# Patient Record
Sex: MALE | Race: WHITE | NOT HISPANIC OR LATINO | Employment: OTHER | URBAN - METROPOLITAN AREA
[De-identification: names, ages, dates, MRNs, and addresses within clinical notes are randomized per-mention and may not be internally consistent; named-entity substitution may affect disease eponyms.]

---

## 2017-01-06 ENCOUNTER — TELEPHONE (OUTPATIENT)
Dept: ENDOCRINOLOGY | Facility: MEDICAL CENTER | Age: 72
End: 2017-01-06

## 2017-01-06 NOTE — TELEPHONE ENCOUNTER
Called Pt and notified in regadrs to his BS reading which is as per Ashly recommend increased Toujeo to 28 units.    Pt will use again Toujeo since his Insurance will cover this medication.    Thank you  Aga

## 2017-01-25 ENCOUNTER — TELEPHONE (OUTPATIENT)
Dept: VASCULAR LAB | Facility: MEDICAL CENTER | Age: 72
End: 2017-01-25

## 2017-01-26 ENCOUNTER — OFFICE VISIT (OUTPATIENT)
Dept: CARDIOLOGY | Facility: CLINIC | Age: 72
End: 2017-01-26
Payer: MEDICARE

## 2017-01-26 ENCOUNTER — ANTICOAGULATION MONITORING (OUTPATIENT)
Dept: VASCULAR LAB | Facility: MEDICAL CENTER | Age: 72
End: 2017-01-26

## 2017-01-26 VITALS
BODY MASS INDEX: 31.97 KG/M2 | WEIGHT: 236 LBS | HEART RATE: 72 BPM | SYSTOLIC BLOOD PRESSURE: 120 MMHG | HEIGHT: 72 IN | OXYGEN SATURATION: 92 % | DIASTOLIC BLOOD PRESSURE: 60 MMHG

## 2017-01-26 DIAGNOSIS — Z95.810 PRESENCE OF BIVENTRICULAR AICD: ICD-10-CM

## 2017-01-26 DIAGNOSIS — I25.5 ISCHEMIC CARDIOMYOPATHY: ICD-10-CM

## 2017-01-26 DIAGNOSIS — I25.83 CORONARY ARTERY DISEASE DUE TO LIPID RICH PLAQUE: ICD-10-CM

## 2017-01-26 DIAGNOSIS — I48.19 PERSISTENT ATRIAL FIBRILLATION (HCC): ICD-10-CM

## 2017-01-26 DIAGNOSIS — Z79.01 ANTICOAGULANT LONG-TERM USE: ICD-10-CM

## 2017-01-26 DIAGNOSIS — I25.10 CORONARY ARTERY DISEASE DUE TO LIPID RICH PLAQUE: ICD-10-CM

## 2017-01-26 DIAGNOSIS — E11.9 TYPE 2 DIABETES MELLITUS WITHOUT COMPLICATION, UNSPECIFIED LONG TERM INSULIN USE STATUS: ICD-10-CM

## 2017-01-26 PROCEDURE — 4040F PNEUMOC VAC/ADMIN/RCVD: CPT | Performed by: INTERNAL MEDICINE

## 2017-01-26 PROCEDURE — G8419 CALC BMI OUT NRM PARAM NOF/U: HCPCS | Performed by: INTERNAL MEDICINE

## 2017-01-26 PROCEDURE — G8598 ASA/ANTIPLAT THER USED: HCPCS | Performed by: INTERNAL MEDICINE

## 2017-01-26 PROCEDURE — G8482 FLU IMMUNIZE ORDER/ADMIN: HCPCS | Performed by: INTERNAL MEDICINE

## 2017-01-26 PROCEDURE — 3017F COLORECTAL CA SCREEN DOC REV: CPT | Mod: 1P | Performed by: INTERNAL MEDICINE

## 2017-01-26 PROCEDURE — G8432 DEP SCR NOT DOC, RNG: HCPCS | Performed by: INTERNAL MEDICINE

## 2017-01-26 PROCEDURE — 3045F PR MOST RECENT HEMOGLOBIN A1C LEVEL 7.0-9.0%: CPT | Performed by: INTERNAL MEDICINE

## 2017-01-26 PROCEDURE — 99214 OFFICE O/P EST MOD 30 MIN: CPT | Performed by: INTERNAL MEDICINE

## 2017-01-26 PROCEDURE — 1101F PT FALLS ASSESS-DOCD LE1/YR: CPT | Mod: 8P | Performed by: INTERNAL MEDICINE

## 2017-01-26 PROCEDURE — 1036F TOBACCO NON-USER: CPT | Performed by: INTERNAL MEDICINE

## 2017-01-26 RX ORDER — INSULIN GLARGINE 300 U/ML
25 INJECTION, SOLUTION SUBCUTANEOUS
Refills: 0 | COMMUNITY
Start: 2017-01-08 | End: 2017-02-21 | Stop reason: SDUPTHER

## 2017-01-26 RX ORDER — INSULIN LISPRO 200 [IU]/ML
INJECTION, SOLUTION SUBCUTANEOUS
Refills: 0 | COMMUNITY
Start: 2017-01-14 | End: 2017-03-09 | Stop reason: SDUPTHER

## 2017-01-26 NOTE — Clinical Note
[facility] [place] [address],   2nd Floor  [place] [address]  Phone: [phone]  Fax: [phone]              Chirag Balderas  1945    Encounter Date: 1/26/2017    Thomas Zhang M.D.    Thank you for the referral. I had the pleasure of seeing Chirag Balderas today in cardiology clinic. I've attached my visit note below. If you have any questions please feel free to give me a call anytime.      Monika Catalan MD, PhD, [facility]  Cardiology and Lipidology  [facility]                                                                PROGRESS NOTE:  Chief Complaint   Patient presents with   • Follow-Up       This patient is an established male who is here today to discuss:  Recheck, CM with LVEF 30% and h/o CHF class II sx's; on O2 24/7    Patient Active Problem List    Diagnosis Date Noted   • Presence of biventricular AICD 05/02/2016     Priority: High   • Persistent atrial fibrillation (CMS-HCC) 03/04/2016     Priority: High   • Atypical atrial flutter (CMS-[facility]) 03/04/2016     Priority: High   • Anticoagulant long-term use 03/04/2016     Priority: High   • Ischemic cardiomyopathy 05/14/2013     Priority: High   • Hypertriglyceridemia 05/14/2013     Priority: High   • Coronary artery disease 06/05/2012     Priority: High   • HTN (hypertension) 06/05/2012     Priority: High   • Hyperlipidemia 06/05/2012     Priority: High   • Systolic CHF (CMS-[facility]) 09/05/2014     Priority: Medium   • Sleep apnea 05/16/2013     Priority: Medium   • Type 2 diabetes mellitus without complication (CMS-[facility]) 06/05/2012     Priority: Medium   • Hypothyroid 09/03/2014     Priority: Low   • Depression 02/21/2014     Priority: Low   • Chronic paroxysmal hemicrania, not intractable 07/09/2015   • Pulmonary nodule 09/03/2014   • ED (erectile dysfunction) 08/20/2013   • Prostate cancer screening 06/25/2013   • Colon cancer screening  06/25/2013       Past Medical History   Diagnosis Date   • Coronary artery disease 2002     History of remote MI/stent   • HTN (hypertension)     • Hyperlipidemia     • Myocardial infarction (CMS-HCC) 2002   • Ischemic cardiomyopathy March 2015     Echocardiogram with LVEF 30-40%. Mild-moderate septal hypertrophy. Moderately enlarged LA.  Moderate-severely enlarged RA. Mild AR. Mild MR. Mild TR, RVSP 5-10mmHg.   • Systolic CHF (CMS-HCC)     • DIABETES MELLITUS      On insulin   • Chronic anticoagulation          • Atrial fibrillation, persistent (CMS-HCC)          • Dental disorder      Dentures   • COPD (chronic obstructive pulmonary disease) (CMS-HCC)    • Supplemental oxygen dependent    • Sleep apnea      Uses CPAP   • Breath shortness      On oxygen   • Cataract      Bilateral IOL   • Hypothyroidism    • Depression      Past Surgical History   Procedure Laterality Date   • Cataract extraction with iol Left 1980         • Cataract extraction with iol Right 2000         • Cardiac cath  2002   • Other neurological surg  03/2013     Laminectomy lumbar   • Recovery  3/18/2016     Procedure: CATH LAB BIV ICD INSERT ST[facility]JUDE WINSLOW;  Surgeon: Recoveryonly Surgery;  Location: SURGERY PRE-POST PROC UNIT [facility];  Service:    • Aicd implant  March 2016     [facility] [facility] Quadra Assura [id number] CRT-D implanted by Dr. Winslow.       Current Outpatient Prescriptions   Medication Sig Dispense Refill   • Blood Glucose Monitoring Suppl SUPPLIES [facility] Freestyle Freedome Lite test strips. Use to test blood sugars 4-5 times daily as directed. Dx: ICD-10. E11.9, Z74.9 500 Strip 3   • TOUJEO SOLOSTAR 300 UNIT/ML Solution Pen-injector   0   • HUMALOG KWIKPEN 200 UNIT/ML Solution Pen-injector inject 10 to 25 use subcutaneously three times a day before meals  0   • sacubitril-valsartan (ENTRESTO) 24-26 MG Tab tablet Take 1 Tab by mouth 2 Times a Day. 60 Tab 11   • levothyroxine (SYNTHROID) 75 MCG Tab take 1 tablet by mouth once daily 90 Tab  "1   • furosemide (LASIX) 40 MG Tab Take 1 Tab by mouth 1 time daily as needed. 30 Tab 4   • carvedilol (COREG) 3.125 MG Tab Take 1 Tab by mouth 2 times a day, with meals. 180 Tab 2   • Insulin Syringe-Needle U-100 (INSULIN SYRINGE .3CC/31GX5/16\") 31G X 5/16\" 0.3 ML Misc For insulin shots 5 times a day 200 Each 6   • metformin (GLUCOPHAGE) 1000 MG tablet TAKE 1 TABLET BY MOUTH TWICE DAILY WITH A MEAL 180 Tab 1   • warfarin (COUMADIN) 5 MG Tab Take one-half to one (1/2-1) tablet daily as directed by coumadin clinic 90 Tab 1   • glucose blood (ONE TOUCH ULTRA TEST) strip Use to test blood sugar levels 5 times daily as directed by physician. ICD-10: E11.9, Z79.4 500 Strip 5   • atorvastatin (LIPITOR) 80 MG tablet TAKE 1 TABLET BY MOUTH EVERY DAY. 90 Tab 3   • insulin regular (HUMULIN R) 100 Unit/mL Solution Inject 20 Units as instructed 3 times a day before meals. 20 mL 6   • insulin NPH (HUMULIN,NOVOLIN) 100 UNIT/ML Suspension Inject 20 Units as instructed 2 Times a Day. 20 mL 5     No current facility-administered medications for this visit.     Pcn      Review of Systems:     Constitutional: Denies fevers, Denies weight changes  Eyes: Denies changes in vision, no eye pain  Ears/Nose/Throat/Mouth: Denies nasal congestion or sore throat   Cardiovascular: Denies chest pain or palpitations   Respiratory: Denies shortness of breath , Denies cough  Gastrointestinal/Hepatic: Denies abdominal pain, nausea, vomiting, diarrhea, constipation or GI bleeding   Genitourinary: Denies bladder dysfunction, dysuria or frequency  Musculoskeletal/Rheum: Denies  joint pain and swelling   Skin/Breast: Denies rash, denies breast lumps or discharge  Neurological: Denies headache, confusion, memory loss or focal weakness/parasthesias  Psychiatric: denies mood disorder   Endocrine:  hx of diabetes or thyroid dysfunction  Heme/Oncology/Lymph Nodes: Denies enlarged lymph nodes, denies brusing or known bleeding disorder  Allergic/Immunologic:  " hx of allergies      All other systems were reviewed and are negative (AMA/CMS criteria)      Blood pressure 120/60, pulse 72, height 1.829 m (6'), weight 107.049 kg (236 lb), SpO2 92 %.  HENT: Normocephalic, Atraumatic, Oropharynx moist mucous membranes, Dentition: , Nose normal. Mallampati 4 OP    Eyes: PERRLA, EOMI, Conjunctiva normal, No discharge.    Neck:Large; Normal range of motion, No cervical tenderness, Supple, No stridor, no JVD. No thyromegaly. No carotid bruit.    Cardiovascular: Normal heart rate, Normal rhythm, nl S1, S2, no S3, butS4; No gallops; 1-2/6 SE murmurs at AoV, MV and TV, No rubs, . Extremitites with intact distal pulses, no cyanosis, clubbing but trace edema. No heaves, thrills, HJR;  Left pectoral BiV/AICD site healing w/o bleeding/redness  Peripheral pulses: carotid 2+, brachial 2+, radial 2+, ulnar 2+, femoral ?, popliteal 1+, PT 1+, DP 1+;    Lungs: Respiratory effort is normal. Diminished breath sounds, breath sounds clear to auscultation bilaterally, no rales, no rhonchi, no wheezing.    Abdomen: large; Bowel sounds normal, Soft, No tenderness, No guarding, No rebound, No masses, No hepatosplenomegaly.    Skin: Warm, Dry, No erythema, No rash, no induration or crepitus.    Neurologic: Alert & oriented x 3, Normal motor function, Normal sensory function, No focal deficits noted, cranial nerves II through XII are normal, normal gait.    Psychiatric: Affect normal, Judgment normal, Mood normal    Results for ELY SOW (MRN [id number]) as of 1/26/2017 13:11   Ref. Range 4/19/2016 14:39 5/31/2016 10:29 9/29/2016 08:45 9/29/2016 13:31 10/11/2016 10:00   Sodium Latest Ref Range: 136-145 mmol/L   141     Potassium Latest Ref Range: 3.5-5.1 mmol/L   4.3     Chloride Latest Ref Range:  mmol/L   106     Co2 Latest Ref Range: 21-32 mmol/L   26     Anion Gap Latest Ref Range: 10-18 mmol/L   13     Glucose Latest Ref Range: 74-99 mg/dL   197 (H)     Bun Latest Ref Range: 7-18 mg/dL    21 (H)     Creatinine Latest Ref Range: 0.8-1.3 mg/dL   1.3     GFR If  Latest Ref Range: >60 mL/min/1.73 m 2   >60     GFR If Non  Latest Ref Range: >60 mL/min/1.73 m 2   54 (A)     Calcium Latest Ref Range: 8.5-11.0 mg/dL   9.0     AST(SGOT) Latest Ref Range: 15-37 U/L   23     ALT(SGPT) Latest Ref Range: 12-78 U/L   26     Alkaline Phosphatase Latest Ref Range:  U/L   51     Total Bilirubin Latest Ref Range: 0.2-1.0 mg/dL   1.0     Albumin Latest Ref Range: 3.4-5.0 g/dL   3.6     Total Protein Latest Ref Range: 6.4-8.2 g/dL   7.4     A-G Ratio Unknown   0.9     Glycohemoglobin Latest Ref Range: <6.0 % 8.2 (H) 9.2  7.6 (H)    Estim. Avg Glu Latest Units: mg/dL 189   171    Cholesterol,Tot Latest Ref Range: 120-200 mg/dL   172     Triglycerides Latest Ref Range: 0-150 mg/dL   240 (H)     HDL Latest Ref Range: 40.0-60.0 mg/dL   34.0 (L)     Non HDL Cholesterol Latest Ref Range:     138     LDL Latest Ref Range: <100 mg/dL   90     Chol-Hdl Ratio Unknown   5.06     Micro Alb Creat Ratio Latest Ref Range: 0-30 ug/mg     20   Creatinine, Urine Latest Units: mg/dL     168.74   Microalbumin, Urine Random Latest Ref Range: 0.0-30.0 mg/L     33.2 (H)       Assessment and Plan.   71 y.o. male has CM, h/o CHF class II sx's; Will recheck Echo and check severity of MR; Other lab ordered; On Warfrain w/o bleedings; Try Entresto;    1. Ischemic cardiomyopathy    - CBC WITHOUT DIFFERENTIAL  - ECHOCARDIOGRAM COMP W/O CONT; Future  - sacubitril-valsartan (ENTRESTO) 24-26 MG Tab tablet; Take 1 Tab by mouth 2 Times a Day.  Dispense: 60 Tab; Refill: 11    2. Coronary artery disease due to lipid rich plaque    - CBC WITHOUT DIFFERENTIAL  - COMP METABOLIC PANEL; Future  - LIPID PANEL  - ECHOCARDIOGRAM COMP W/O CONT; Future  - sacubitril-valsartan (ENTRESTO) 24-26 MG Tab tablet; Take 1 Tab by mouth 2 Times a Day.  Dispense: 60 Tab; Refill: 11    3. Presence of biventricular AICD  Stable needs  recheck    4. Persistent atrial fibrillation (CMS-HCC)    - TSH; Future    5. Anticoagulant long-term use  No bleeding    6. Type 2 diabetes mellitus without complication, unspecified long term insulin use status (CMS-HCC)    - HEMOGLOBIN A1C      1. Ischemic cardiomyopathy  CBC WITHOUT DIFFERENTIAL    ECHOCARDIOGRAM COMP W/O CONT    sacubitril-valsartan (ENTRESTO) 24-26 MG Tab tablet   2. Coronary artery disease due to lipid rich plaque  CBC WITHOUT DIFFERENTIAL    COMP METABOLIC PANEL    LIPID PANEL    ECHOCARDIOGRAM COMP W/O CONT    sacubitril-valsartan (ENTRESTO) 24-26 MG Tab tablet   3. Presence of biventricular AICD     4. Persistent atrial fibrillation (CMS-HCC)  TSH   5. Anticoagulant long-term use     6. Type 2 diabetes mellitus without complication, unspecified long term insulin use status (CMS-HCC)  HEMOGLOBIN A1C             Thomas Zhang M.D.  [address] [place] [address] [address]  [facility] [address]  VIA In Basket

## 2017-01-26 NOTE — PROGRESS NOTES
Chief Complaint   Patient presents with   • Follow-Up       This patient is an established male who is here today to discuss:  Recheck, CM with LVEF 30% and h/o CHF class II sx's; on O2 24/7    Patient Active Problem List    Diagnosis Date Noted   • Presence of biventricular AICD 05/02/2016     Priority: High   • Persistent atrial fibrillation (CMS-HCC) 03/04/2016     Priority: High   • Atypical atrial flutter (CMS-HCC) 03/04/2016     Priority: High   • Anticoagulant long-term use 03/04/2016     Priority: High   • Ischemic cardiomyopathy 05/14/2013     Priority: High   • Hypertriglyceridemia 05/14/2013     Priority: High   • Coronary artery disease 06/05/2012     Priority: High   • HTN (hypertension) 06/05/2012     Priority: High   • Hyperlipidemia 06/05/2012     Priority: High   • Systolic CHF (CMS-HCC) 09/05/2014     Priority: Medium   • Sleep apnea 05/16/2013     Priority: Medium   • Type 2 diabetes mellitus without complication (CMS-HCC) 06/05/2012     Priority: Medium   • Hypothyroid 09/03/2014     Priority: Low   • Depression 02/21/2014     Priority: Low   • Chronic paroxysmal hemicrania, not intractable 07/09/2015   • Pulmonary nodule 09/03/2014   • ED (erectile dysfunction) 08/20/2013   • Prostate cancer screening 06/25/2013   • Colon cancer screening 06/25/2013       Past Medical History   Diagnosis Date   • Coronary artery disease 2002     History of remote MI/stent   • HTN (hypertension)     • Hyperlipidemia     • Myocardial infarction (CMS-[facility]) 2002   • Ischemic cardiomyopathy March 2015     Echocardiogram with LVEF 30-40%. Mild-moderate septal hypertrophy. Moderately enlarged LA.  Moderate-severely enlarged RA. Mild AR. Mild MR. Mild TR, RVSP 5-10mmHg.   • Systolic CHF (CMS-HCC)     • DIABETES MELLITUS      On insulin   • Chronic anticoagulation          • Atrial fibrillation, persistent (CMS-[facility])          • Dental disorder      Dentures   • COPD (chronic obstructive pulmonary disease) (CMS-[facility])    •  "Supplemental oxygen dependent    • Sleep apnea      Uses CPAP   • Breath shortness      On oxygen   • Cataract      Bilateral IOL   • Hypothyroidism    • Depression      Past Surgical History   Procedure Laterality Date   • Cataract extraction with iol Left 1980         • Cataract extraction with iol Right 2000         • Cardiac cath  2002   • Other neurological surg  03/2013     Laminectomy lumbar   • Recovery  3/18/2016     Procedure: CATH LAB BIV ICD INSERT [facility]JUDE WINSLOW;  Surgeon: Recoveryonomer Surgery;  Location: SURGERY PRE-POST PROC UNIT [facility];  Service:    • Aicd implant  March 2016     [facility] Quadra [employer]ura [id number] CRT-D implanted by Dr. Winslow.       Current Outpatient Prescriptions   Medication Sig Dispense Refill   • Blood Glucose Monitoring Suppl SUPPLIES [facility] Freestyle Freedome Lite test strips. Use to test blood sugars 4-5 times daily as directed. Dx: ICD-10. E11.9, Z74.9 500 Strip 3   • TOUJEO SOLOSTAR 300 UNIT/ML Solution Pen-injector   0   • HUMALOG KWIKPEN 200 UNIT/ML Solution Pen-injector inject 10 to 25 use subcutaneously three times a day before meals  0   • sacubitril-valsartan (ENTRESTO) 24-26 MG Tab tablet Take 1 Tab by mouth 2 Times a Day. 60 Tab 11   • levothyroxine (SYNTHROID) 75 MCG Tab take 1 tablet by mouth once daily 90 Tab 1   • furosemide (LASIX) 40 MG Tab Take 1 Tab by mouth 1 time daily as needed. 30 Tab 4   • carvedilol (COREG) 3.125 MG Tab Take 1 Tab by mouth 2 times a day, with meals. 180 Tab 2   • Insulin Syringe-Needle U-100 (INSULIN SYRINGE .3CC/31GX5/16\") 31G X 5/16\" 0.3 ML Misc For insulin shots 5 times a day 200 Each 6   • metformin (GLUCOPHAGE) 1000 MG tablet TAKE 1 TABLET BY MOUTH TWICE DAILY WITH A MEAL 180 Tab 1   • warfarin (COUMADIN) 5 MG Tab Take one-half to one (1/2-1) tablet daily as directed by coumadin clinic 90 Tab 1   • glucose blood (ONE TOUCH ULTRA TEST) strip Use to test blood sugar levels 5 times daily as directed by physician. ICD-10: E11.9, Z79.4 " 500 Strip 5   • atorvastatin (LIPITOR) 80 MG tablet TAKE 1 TABLET BY MOUTH EVERY DAY. 90 Tab 3   • insulin regular (HUMULIN R) 100 Unit/mL Solution Inject 20 Units as instructed 3 times a day before meals. 20 mL 6   • insulin NPH (HUMULIN,NOVOLIN) 100 UNIT/ML Suspension Inject 20 Units as instructed 2 Times a Day. 20 mL 5     No current facility-administered medications for this visit.     Pcn      Review of Systems:     Constitutional: Denies fevers, Denies weight changes  Eyes: Denies changes in vision, no eye pain  Ears/Nose/Throat/Mouth: Denies nasal congestion or sore throat   Cardiovascular: Denies chest pain or palpitations   Respiratory: Denies shortness of breath , Denies cough  Gastrointestinal/Hepatic: Denies abdominal pain, nausea, vomiting, diarrhea, constipation or GI bleeding   Genitourinary: Denies bladder dysfunction, dysuria or frequency  Musculoskeletal/Rheum: Denies  joint pain and swelling   Skin/Breast: Denies rash, denies breast lumps or discharge  Neurological: Denies headache, confusion, memory loss or focal weakness/parasthesias  Psychiatric: denies mood disorder   Endocrine:  hx of diabetes or thyroid dysfunction  Heme/Oncology/Lymph Nodes: Denies enlarged lymph nodes, denies brusing or known bleeding disorder  Allergic/Immunologic:  hx of allergies      All other systems were reviewed and are negative (AMA/CMS criteria)      Blood pressure 120/60, pulse 72, height 1.829 m (6'), weight 107.049 kg (236 lb), SpO2 92 %.  HENT: Normocephalic, Atraumatic, Oropharynx moist mucous membranes, Dentition: , Nose normal. Mallampati 4 OP    Eyes: PERRLA, EOMI, Conjunctiva normal, No discharge.    Neck:Large; Normal range of motion, No cervical tenderness, Supple, No stridor, no JVD. No thyromegaly. No carotid bruit.    Cardiovascular: Normal heart rate, Normal rhythm, nl S1, S2, no S3, butS4; No gallops; 1-2/6 SE murmurs at AoV, MV and TV, No rubs, . Extremitites with intact distal pulses, no  cyanosis, clubbing but trace edema. No heaves, thrills, HJR;  Left pectoral BiV/AICD site healing w/o bleeding/redness  Peripheral pulses: carotid 2+, brachial 2+, radial 2+, ulnar 2+, femoral ?, popliteal 1+, PT 1+, DP 1+;    Lungs: Respiratory effort is normal. Diminished breath sounds, breath sounds clear to auscultation bilaterally, no rales, no rhonchi, no wheezing.    Abdomen: large; Bowel sounds normal, Soft, No tenderness, No guarding, No rebound, No masses, No hepatosplenomegaly.    Skin: Warm, Dry, No erythema, No rash, no induration or crepitus.    Neurologic: Alert & oriented x 3, Normal motor function, Normal sensory function, No focal deficits noted, cranial nerves II through XII are normal, normal gait.    Psychiatric: Affect normal, Judgment normal, Mood normal    Results for ELY SOW (MRN [id number]) as of 1/26/2017 13:11   Ref. Range 4/19/2016 14:39 5/31/2016 10:29 9/29/2016 08:45 9/29/2016 13:31 10/11/2016 10:00   Sodium Latest Ref Range: 136-145 mmol/L   141     Potassium Latest Ref Range: 3.5-5.1 mmol/L   4.3     Chloride Latest Ref Range:  mmol/L   106     Co2 Latest Ref Range: 21-32 mmol/L   26     Anion Gap Latest Ref Range: 10-18 mmol/L   13     Glucose Latest Ref Range: 74-99 mg/dL   197 (H)     Bun Latest Ref Range: 7-18 mg/dL   21 (H)     Creatinine Latest Ref Range: 0.8-1.3 mg/dL   1.3     GFR If  Latest Ref Range: >60 mL/min/1.73 m 2   >60     GFR If Non  Latest Ref Range: >60 mL/min/1.73 m 2   54 (A)     Calcium Latest Ref Range: 8.5-11.0 mg/dL   9.0     AST(SGOT) Latest Ref Range: 15-37 U/L   23     ALT(SGPT) Latest Ref Range: 12-78 U/L   26     Alkaline Phosphatase Latest Ref Range:  U/L   51     Total Bilirubin Latest Ref Range: 0.2-1.0 mg/dL   1.0     Albumin Latest Ref Range: 3.4-5.0 g/dL   3.6     Total Protein Latest Ref Range: 6.4-8.2 g/dL   7.4     A-G Ratio Unknown   0.9     Glycohemoglobin Latest Ref Range: <6.0 % 8.2 (H)  9.2  7.6 (H)    Estim. Avg Glu Latest Units: mg/dL 189   171    Cholesterol,Tot Latest Ref Range: 120-200 mg/dL   172     Triglycerides Latest Ref Range: 0-150 mg/dL   240 (H)     HDL Latest Ref Range: 40.0-60.0 mg/dL   34.0 (L)     Non HDL Cholesterol Latest Ref Range:     138     LDL Latest Ref Range: <100 mg/dL   90     Chol-Hdl Ratio Unknown   5.06     Micro Alb Creat Ratio Latest Ref Range: 0-30 ug/mg     20   Creatinine, Urine Latest Units: mg/dL     168.74   Microalbumin, Urine Random Latest Ref Range: 0.0-30.0 mg/L     33.2 (H)       Assessment and Plan.   71 y.o. male has CM, h/o CHF class II sx's; Will recheck Echo and check severity of MR; Other lab ordered; On Warfrain w/o bleedings; Try Entresto;    1. Ischemic cardiomyopathy    - CBC WITHOUT DIFFERENTIAL  - ECHOCARDIOGRAM COMP W/O CONT; Future  - sacubitril-valsartan (ENTRESTO) 24-26 MG Tab tablet; Take 1 Tab by mouth 2 Times a Day.  Dispense: 60 Tab; Refill: 11    2. Coronary artery disease due to lipid rich plaque    - CBC WITHOUT DIFFERENTIAL  - COMP METABOLIC PANEL; Future  - LIPID PANEL  - ECHOCARDIOGRAM COMP W/O CONT; Future  - sacubitril-valsartan (ENTRESTO) 24-26 MG Tab tablet; Take 1 Tab by mouth 2 Times a Day.  Dispense: 60 Tab; Refill: 11    3. Presence of biventricular AICD  Stable needs recheck    4. Persistent atrial fibrillation (CMS-HCC)    - TSH; Future    5. Anticoagulant long-term use  No bleeding    6. Type 2 diabetes mellitus without complication, unspecified long term insulin use status (CMS-HCC)    - HEMOGLOBIN A1C      1. Ischemic cardiomyopathy  CBC WITHOUT DIFFERENTIAL    ECHOCARDIOGRAM COMP W/O CONT    sacubitril-valsartan (ENTRESTO) 24-26 MG Tab tablet   2. Coronary artery disease due to lipid rich plaque  CBC WITHOUT DIFFERENTIAL    COMP METABOLIC PANEL    LIPID PANEL    ECHOCARDIOGRAM COMP W/O CONT    sacubitril-valsartan (ENTRESTO) 24-26 MG Tab tablet   3. Presence of biventricular AICD     4. Persistent atrial  fibrillation (CMS-[facility])  TSH   5. Anticoagulant long-term use     6. Type 2 diabetes mellitus without complication, unspecified long term insulin use status (CMS-[facility])  HEMOGLOBIN A1C

## 2017-01-26 NOTE — MR AVS SNAPSHOT
Chirag Balderas   2017 1:00 PM   Office Visit   MRN: [id number]    Department:  Heart [facility]   Dept Phone:  [phone]    Description:  Male : 1945   Provider:  Monika Catalan MD,[facility]           Reason for Visit     Follow-Up           Allergies as of 2017     Allergen Noted Reactions    Pcn [Penicillins] 2012       swelling      You were diagnosed with     Ischemic cardiomyopathy   [861548]       Coronary artery disease due to lipid rich plaque   [7677234]       Presence of biventricular AICD   [776071]       Persistent atrial fibrillation (CMS-HCC)   [334008]       Anticoagulant long-term use   [412843]       Type 2 diabetes mellitus without complication, unspecified long term insulin use status (CMS-HCC)   [7820331]         Vital Signs     Blood Pressure Pulse Height Weight Body Mass Index Oxygen Saturation    120/60 mmHg 72 1.829 m (6') 107.049 kg (236 lb) 32.00 kg/m2 92%    Smoking Status                   Former Smoker           Basic Information     Date Of Birth Sex Race Ethnicity Preferred Language    1945 Male White Non- English      Your appointments     2017 11:00 AM   Diabetes Care Visit with Indira Herrera M.D., ENDOCRINOLOGY DIABETES RN   [facility] [address], [address]  [facility] [address] [phone]           You will be receiving a confirmation call a few days before your appointment from our automated call confirmation system.            2017  1:20 PM   FOLLOW UP with [facility] Catalan MD,[facility] (--[facility]    [address] [address] [phone]              Problem List              ICD-10-CM Priority Class Noted - Resolved    Coronary artery disease I25.10 High  2012 - Present    HTN (hypertension) I10 High  2012 - Present    Type 2 diabetes mellitus without complication  (CMS-HCC) E11.9 Medium  6/5/2012 - Present    Hyperlipidemia E78.5 High  6/5/2012 - Present    Ischemic cardiomyopathy I25.5 High  5/14/2013 - Present    Hypertriglyceridemia E78.1 High  5/14/2013 - Present    Sleep apnea G47.30 Medium  5/16/2013 - Present    Prostate cancer screening Z12.5   6/25/2013 - Present    Colon cancer screening Z12.11   6/25/2013 - Present    ED (erectile dysfunction) N52.9   8/20/2013 - Present    Depression F32.9 Low  2/21/2014 - Present    Hypothyroid E03.9 Low  9/3/2014 - Present    Pulmonary nodule R91.1   9/3/2014 - Present    Systolic CHF (CMS-HCC) I50.20 Medium  9/5/2014 - Present    Chronic paroxysmal hemicrania, not intractable G44.049   7/9/2015 - Present    Persistent atrial fibrillation (CMS-HCC) I48.1 High  3/4/2016 - Present    Atypical atrial flutter (CMS-HCC) I48.4 High  3/4/2016 - Present    Anticoagulant long-term use Z79.01 High  3/4/2016 - Present    Presence of biventricular AICD Z95.810 High  5/2/2016 - Present      Health Maintenance        Date Due Completion Dates    IMM DTaP/Tdap/Td Vaccine (1 - Tdap) 9/8/1963 ---    IMM ZOSTER VACCINE 9/8/2004 ---    IMM PNEUMOCOCCAL 65+ (ADULT) LOW/MEDIUM RISK SERIES (2 of 2 - PCV13) 10/8/2013 10/8/2012    RETINAL SCREENING 2/1/2017 2/1/2016, 2/1/2016, 8/26/2015 (Done), 11/1/2014 (N/S), 12/19/2013 (N/S)    Override on 8/26/2015: Done (Dr. Baez)    Override on 11/1/2014: (N/S)    Override on 12/19/2013: (N/S)    A1C SCREENING 3/29/2017 9/29/2016, 5/31/2016, 4/19/2016, 11/16/2015, 11/16/2015 (N/S), 8/10/2015, 5/7/2015, 5/7/2015 (N/S), 12/3/2014, 12/3/2014 (N/S), 9/3/2014, 6/11/2014, 6/11/2014 (N/S), 3/11/2014, 3/11/2014 (N/S), 12/3/2013, 12/3/2013 (N/S), 6/25/2013, 3/26/2013, 12/19/2012, 10/8/2012, 7/3/2012    Override on 11/16/2015: (N/S)    Override on 5/7/2015: (N/S)    Override on 12/3/2014: (N/S)    Override on 6/11/2014: (N/S)    Override on 3/11/2014: (N/S)    Override on 12/3/2013: (N/S)    FASTING LIPID PROFILE 9/29/2017  9/29/2016, 11/10/2015, 1/5/2015, 10/9/2014, 3/5/2014, 6/20/2013, 6/19/2012    SERUM CREATININE 9/29/2017 9/29/2016, 3/19/2016, 3/16/2016, 11/10/2015, 6/29/2015, 4/28/2015, 1/30/2015, 1/5/2015, 10/9/2014, 8/26/2014, 8/25/2014, 8/24/2014, 8/23/2014, 8/22/2014, 3/5/2014, 6/20/2013, 5/17/2013, 6/19/2012    DIABETES MONOFILAMENT / LE EXAM 10/5/2017 10/5/2016, 5/7/2015 (N/S), 3/11/2014 (N/S)    Override on 5/7/2015: (N/S)    Override on 3/11/2014: (N/S)    URINE ACR / MICROALBUMIN 10/11/2017 10/11/2016, 11/10/2015, 3/5/2014, 6/20/2013, 6/19/2012    COLONOSCOPY 6/11/2024 6/11/2014 (Declined)    Override on 6/11/2014: Patient Declined            Current Immunizations     Influenza TIV (IM) 10/15/2014, 10/3/2013, 10/8/2012    Influenza Vaccine Adult HD 10/10/2016 10:07 AM    Influenza Vaccine Quad Inj (Preserved) 11/16/2015 10:26 AM    Pneumococcal polysaccharide vaccine (PPSV-23) 10/8/2012      Below and/or attached are the medications your provider expects you to take. Review all of your home medications and newly ordered medications with your provider and/or pharmacist. Follow medication instructions as directed by your provider and/or pharmacist. Please keep your medication list with you and share with your provider. Update the information when medications are discontinued, doses are changed, or new medications (including over-the-counter products) are added; and carry medication information at all times in the event of emergency situations     Allergies:  PCN - (reactions not documented)               Medications  Valid as of: January 26, 2017 -  1:22 PM    Generic Name Brand Name Tablet Size Instructions for use    Atorvastatin Calcium (Tab) LIPITOR 80 MG TAKE 1 TABLET BY MOUTH EVERY DAY.        Blood Glucose Monitoring Suppl (Misc) Blood Glucose Monitoring Suppl SUPPLIES Freestyle Freedome Lite test strips. Use to test blood sugars 4-5 times daily as directed. Dx: ICD-10. E11.9, Z74.9        Carvedilol (Tab) COREG  "3.125 MG Take 1 Tab by mouth 2 times a day, with meals.        Furosemide (Tab) LASIX 40 MG Take 1 Tab by mouth 1 time daily as needed.        Glucose Blood (Strip) glucose blood  Use to test blood sugar levels 5 times daily as directed by physician. ICD-10: E11.9, Z79.4        Insulin Glargine (Solution Pen-injector) TOUJEO SOLOSTAR 300 UNIT/ML         Insulin Lispro (Solution Pen-injector) HUMALOG KWIKPEN 200 UNIT/ML inject 10 to 25 use subcutaneously three times a day before meals        Insulin NPH Human (Isophane) (Suspension) HUMULIN,NOVOLIN 100 UNIT/ML Inject 20 Units as instructed 2 Times a Day.        Insulin Regular Human (Solution) HUMULIN R 100 Unit/mL Inject 20 Units as instructed 3 times a day before meals.        Insulin Syringe-Needle U-100 (Misc) INSULIN SYRINGE .3CC/31GX5/16\" 31G X 5/16\" 0.3 ML For insulin shots 5 times a day        Levothyroxine Sodium (Tab) SYNTHROID 75 MCG take 1 tablet by mouth once daily        MetFORMIN HCl (Tab) GLUCOPHAGE 1000 MG TAKE 1 TABLET BY MOUTH TWICE DAILY WITH A MEAL        Sacubitril-Valsartan (Tab) ENTRESTO 24-26 MG Take 1 Tab by mouth 2 Times a Day.        Warfarin Sodium (Tab) COUMADIN 5 MG Take one-half to one (1/2-1) tablet daily as directed by coumadin clinic        .                 Medicines prescribed today were sent to:     [id number] [address] [address]    Phone: [phone] Fax: [phone]    Open 24 Hours?: No      Medication refill instructions:       If your prescription bottle indicates you have medication refills left, it is not necessary to call your provider’s office. Please contact your pharmacy and they will refill your medication.    If your prescription bottle indicates you do not have any refills left, you may request refills at any time through one of the following ways: The online [employer] system (except Urgent Care), by calling your " provider’s office, or by asking your pharmacy to contact your provider’s office with a refill request. Medication refills are processed only during regular business hours and may not be available until the next business day. Your provider may request additional information or to have a follow-up visit with you prior to refilling your medication.   *Please Note: Medication refills are assigned a new Rx number when refilled electronically. Your pharmacy may indicate that no refills were authorized even though a new prescription for the same medication is available at the pharmacy. Please request the medicine by name with the pharmacy before contacting your provider for a refill.        Your To Do List     Future Labs/Procedures Complete By Expires    COMP METABOLIC PANEL  As directed 1/26/2018    ECHOCARDIOGRAM COMP W/O CONT  As directed 1/26/2018    TSH  As directed 1/26/2018         MyChart Access Code: Activation code not generated  Current [employer] Status: Active

## 2017-01-30 ENCOUNTER — ANTICOAGULATION MONITORING (OUTPATIENT)
Dept: VASCULAR LAB | Facility: MEDICAL CENTER | Age: 72
End: 2017-01-30

## 2017-01-30 LAB — INR PPP: 3 (ref 2–3.5)

## 2017-01-31 NOTE — PROGRESS NOTES
Anticoagulation Summary as of 1/30/2017     INR goal 2.0-3.0    Selected INR 3 (1/30/2017)    Maintenance plan 2.5 mg (5 mg x 0.5) on Mon, Wed, Fri; 5 mg (5 mg x 1) all other days    Weekly total 27.5 mg    No change documented Rachana Chaparro, Med Ass't    Plan last modified Cherelle Nixno, MEHRAN (3/30/2016)    Next INR check 2/13/2017    Target end date Indefinite      Anticoagulation Episode Summary     INR check location Outside Lab    Preferred lab     Send INR reminders to     Comments       Anticoagulation Care Providers     Provider Role Specialty Phone number    Zain Winslow M.D. Referring Cardiac Electrophysiology [phone]    Cherelle Nixon PHARMD Responsible          Anticoagulation Patient Findings   Negatives Missed Doses, Extra Doses, Medication Changes, Antibiotic Use, Diet Changes, Dental/Other Procedures, Hospitalization, Bleeding Gums, Nose Bleeds, Blood in Urine, Blood in Stool, Any Bruising, Other Complaints        PATIENT NOW HAS PAKO AGUILA. THIS RESULT WAS RECEIVED AS A PRACTICE FROM THE FACE TO FACE TRAINING BY PAKO. PATIENT TO RETEST AGAIN IN 2 WEEKS.    Rachana Chaparro, Med Ass't    I have reviewed and agree with the plan above on 01/31/2017      ROSANA LimaD

## 2017-02-02 ENCOUNTER — TELEPHONE (OUTPATIENT)
Dept: CARDIOLOGY | Facility: MEDICAL CENTER | Age: 72
End: 2017-02-02

## 2017-02-02 ENCOUNTER — OFFICE VISIT (OUTPATIENT)
Dept: ENDOCRINOLOGY | Facility: MEDICAL CENTER | Age: 72
End: 2017-02-02
Payer: MEDICARE

## 2017-02-02 VITALS
DIASTOLIC BLOOD PRESSURE: 72 MMHG | WEIGHT: 237 LBS | HEIGHT: 72 IN | BODY MASS INDEX: 32.1 KG/M2 | SYSTOLIC BLOOD PRESSURE: 124 MMHG

## 2017-02-02 DIAGNOSIS — E03.9 ACQUIRED HYPOTHYROIDISM: ICD-10-CM

## 2017-02-02 DIAGNOSIS — E78.2 MIXED HYPERLIPIDEMIA: ICD-10-CM

## 2017-02-02 DIAGNOSIS — Z79.4 TYPE 2 DIABETES MELLITUS WITHOUT COMPLICATION, WITH LONG-TERM CURRENT USE OF INSULIN (HCC): ICD-10-CM

## 2017-02-02 DIAGNOSIS — E11.9 TYPE 2 DIABETES MELLITUS WITHOUT COMPLICATION, WITH LONG-TERM CURRENT USE OF INSULIN (HCC): ICD-10-CM

## 2017-02-02 DIAGNOSIS — I10 ESSENTIAL HYPERTENSION: ICD-10-CM

## 2017-02-02 LAB
HBA1C MFR BLD: 8.8 % (ref ?–5.8)
INT CON NEG: NORMAL
INT CON POS: NORMAL

## 2017-02-02 PROCEDURE — G8419 CALC BMI OUT NRM PARAM NOF/U: HCPCS | Performed by: INTERNAL MEDICINE

## 2017-02-02 PROCEDURE — 4040F PNEUMOC VAC/ADMIN/RCVD: CPT | Performed by: INTERNAL MEDICINE

## 2017-02-02 PROCEDURE — 3045F PR MOST RECENT HEMOGLOBIN A1C LEVEL 7.0-9.0%: CPT | Performed by: INTERNAL MEDICINE

## 2017-02-02 PROCEDURE — G8598 ASA/ANTIPLAT THER USED: HCPCS | Performed by: INTERNAL MEDICINE

## 2017-02-02 PROCEDURE — 83036 HEMOGLOBIN GLYCOSYLATED A1C: CPT | Performed by: INTERNAL MEDICINE

## 2017-02-02 PROCEDURE — G8482 FLU IMMUNIZE ORDER/ADMIN: HCPCS | Performed by: INTERNAL MEDICINE

## 2017-02-02 PROCEDURE — 1101F PT FALLS ASSESS-DOCD LE1/YR: CPT | Mod: 8P | Performed by: INTERNAL MEDICINE

## 2017-02-02 PROCEDURE — 99214 OFFICE O/P EST MOD 30 MIN: CPT | Mod: 25 | Performed by: INTERNAL MEDICINE

## 2017-02-02 PROCEDURE — 3017F COLORECTAL CA SCREEN DOC REV: CPT | Mod: 1P | Performed by: INTERNAL MEDICINE

## 2017-02-02 PROCEDURE — 1036F TOBACCO NON-USER: CPT | Performed by: INTERNAL MEDICINE

## 2017-02-02 RX ORDER — LEVOTHYROXINE SODIUM 0.1 MG/1
100 TABLET ORAL
Qty: 90 TAB | Refills: 3 | Status: SHIPPED | OUTPATIENT
Start: 2017-02-02 | End: 2017-09-13

## 2017-02-02 NOTE — MR AVS SNAPSHOT
Chirag Balderas   2017 11:00 AM   Office Visit   MRN: [id number]    Department:  Endocrinology Med Grp   Dept Phone:  [phone]    Description:  Male : 1945   Provider:  Indira Herrera M.D.; ENDOCRINOLOGY DIABETES RN           Reason for Visit     Insulin Dependent Diabetes           Allergies as of 2017     Allergen Noted Reactions    Pcn [Penicillins] 2012       swelling      You were diagnosed with     Type 2 diabetes mellitus without complication, with long-term current use of insulin (CMS-[facility])   [2433801]       Acquired hypothyroidism   [1992772]       Essential hypertension   [8140635]       Mixed hyperlipidemia   [272.2.ICD-9-CM]         Vital Signs     Blood Pressure Height Weight Body Mass Index Smoking Status       124/72 mmHg 1.829 m (6') 107.502 kg (237 lb) 32.14 kg/m2 Former Smoker       Basic Information     Date Of Birth Sex Race Ethnicity Preferred Language    1945 Male White Non- English      Your appointments     2017  1:20 PM   FOLLOW UP with Monika Catalan MD,[facility] (--)    [address] [place] [address]  2nd Floor  [facility] [address] [phone]            May 24, 2017 10:20 AM   Diabetes Care Visit with Indira Herrera M.D., ENDOCRINOLOGY DIABETES RN   [facility]    [address] [facility], [address]  [facility] [address] [phone]           You will be receiving a confirmation call a few days before your appointment from our automated call confirmation system.              Problem List              ICD-10-CM Priority Class Noted - Resolved    Coronary artery disease I25.10 High  2012 - Present    HTN (hypertension) I10 High  2012 - Present    Type 2 diabetes mellitus without complication (CMS-[facility]) E11.9 Medium  2012 - Present    Hyperlipidemia E78.5 High  2012 - Present    Ischemic cardiomyopathy I25.5 High  2013 -  Present    Hypertriglyceridemia E78.1 High  5/14/2013 - Present    Sleep apnea G47.30 Medium  5/16/2013 - Present    Prostate cancer screening Z12.5   6/25/2013 - Present    Colon cancer screening Z12.11   6/25/2013 - Present    ED (erectile dysfunction) N52.9   8/20/2013 - Present    Depression F32.9 Low  2/21/2014 - Present    Hypothyroid E03.9 Low  9/3/2014 - Present    Pulmonary nodule R91.1   9/3/2014 - Present    Systolic CHF (CMS-HCC) I50.20 Medium  9/5/2014 - Present    Chronic paroxysmal hemicrania, not intractable G44.049   7/9/2015 - Present    Persistent atrial fibrillation (CMS-HCC) I48.1 High  3/4/2016 - Present    Atypical atrial flutter (CMS-HCC) I48.4 High  3/4/2016 - Present    Anticoagulant long-term use Z79.01 High  3/4/2016 - Present    Presence of biventricular AICD Z95.810 High  5/2/2016 - Present      Health Maintenance        Date Due Completion Dates    IMM DTaP/Tdap/Td Vaccine (1 - Tdap) 9/8/1963 ---    IMM ZOSTER VACCINE 9/8/2004 ---    IMM PNEUMOCOCCAL 65+ (ADULT) LOW/MEDIUM RISK SERIES (2 of 2 - PCV13) 10/8/2013 10/8/2012    RETINAL SCREENING 2/1/2017 2/1/2016, 2/1/2016, 8/26/2015 (Done), 11/1/2014 (N/S), 12/19/2013 (N/S)    Override on 8/26/2015: Done (Dr. Baez)    Override on 11/1/2014: (N/S)    Override on 12/19/2013: (N/S)    A1C SCREENING 3/29/2017 9/29/2016, 5/31/2016, 4/19/2016, 11/16/2015, 11/16/2015 (N/S), 8/10/2015, 5/7/2015, 5/7/2015 (N/S), 12/3/2014, 12/3/2014 (N/S), 9/3/2014, 6/11/2014, 6/11/2014 (N/S), 3/11/2014, 3/11/2014 (N/S), 12/3/2013, 12/3/2013 (N/S), 6/25/2013, 3/26/2013, 12/19/2012, 10/8/2012, 7/3/2012    Override on 11/16/2015: (N/S)    Override on 5/7/2015: (N/S)    Override on 12/3/2014: (N/S)    Override on 6/11/2014: (N/S)    Override on 3/11/2014: (N/S)    Override on 12/3/2013: (N/S)    FASTING LIPID PROFILE 9/29/2017 9/29/2016, 11/10/2015, 1/5/2015, 10/9/2014, 3/5/2014, 6/20/2013, 6/19/2012    SERUM CREATININE 9/29/2017 9/29/2016, 3/19/2016, 3/16/2016,  11/10/2015, 6/29/2015, 4/28/2015, 1/30/2015, 1/5/2015, 10/9/2014, 8/26/2014, 8/25/2014, 8/24/2014, 8/23/2014, 8/22/2014, 3/5/2014, 6/20/2013, 5/17/2013, 6/19/2012    DIABETES MONOFILAMENT / LE EXAM 10/5/2017 10/5/2016, 5/7/2015 (N/S), 3/11/2014 (N/S)    Override on 5/7/2015: (N/S)    Override on 3/11/2014: (N/S)    URINE ACR / MICROALBUMIN 10/11/2017 10/11/2016, 11/10/2015, 3/5/2014, 6/20/2013, 6/19/2012    COLONOSCOPY 6/11/2024 6/11/2014 (Declined)    Override on 6/11/2014: Patient Declined            Results     POCT Hemoglobin A1C      Component    Glycohemoglobin    8.8    Comment:     lot [id number]  9/18    Internal Control Negative    Internal Control Positive                        Current Immunizations     Influenza TIV (IM) 10/15/2014, 10/3/2013, 10/8/2012    Influenza Vaccine Adult HD 10/10/2016 10:07 AM    Influenza Vaccine Quad Inj (Preserved) 11/16/2015 10:26 AM    Pneumococcal polysaccharide vaccine (PPSV-23) 10/8/2012      Below and/or attached are the medications your provider expects you to take. Review all of your home medications and newly ordered medications with your provider and/or pharmacist. Follow medication instructions as directed by your provider and/or pharmacist. Please keep your medication list with you and share with your provider. Update the information when medications are discontinued, doses are changed, or new medications (including over-the-counter products) are added; and carry medication information at all times in the event of emergency situations     Allergies:  PCN - (reactions not documented)               Medications  Valid as of: February 02, 2017 - 11:41 AM    Generic Name Brand Name Tablet Size Instructions for use    Atorvastatin Calcium (Tab) LIPITOR 80 MG TAKE 1 TABLET BY MOUTH EVERY DAY.        Blood Glucose Monitoring Suppl (Misc) Blood Glucose Monitoring Suppl SUPPLIES Freestyle Freedome Lite test strips. Use to test blood sugars 4-5 times daily as directed. Dx:  "ICD-10. E11.9, Z74.9        Carvedilol (Tab) COREG 3.125 MG Take 1 Tab by mouth 2 times a day, with meals.        Furosemide (Tab) LASIX 40 MG Take 1 Tab by mouth 1 time daily as needed.        Glucose Blood (Strip) glucose blood  Use to test blood sugar levels 5 times daily as directed by physician. ICD-10: E11.9, Z79.4        Insulin Glargine (Solution Pen-injector) TOUJEO SOLOSTAR 300 UNIT/ML 25 Units every bedtime.        Insulin Lispro (Solution Pen-injector) HUMALOG KWIKPEN 200 UNIT/ML inject 10 to 25 use subcutaneously three times a day before meals        Insulin Syringe-Needle U-100 (Misc) INSULIN SYRINGE .3CC/31GX5/16\" 31G X 5/16\" 0.3 ML For insulin shots 5 times a day        Levothyroxine Sodium (Tab) SYNTHROID 100 MCG Take 1 Tab by mouth Every morning on an empty stomach.        MetFORMIN HCl (Tab) GLUCOPHAGE 1000 MG TAKE 1 TABLET BY MOUTH TWICE DAILY WITH A MEAL        Sacubitril-Valsartan (Tab) ENTRESTO 24-26 MG Take 1 Tab by mouth 2 Times a Day.        Warfarin Sodium (Tab) COUMADIN 5 MG Take one-half to one (1/2-1) tablet daily as directed by coumadin clinic        .                 Medicines prescribed today were sent to:     [id number]   [facility] [address]  [address] [facility] [address]    Phone: [phone] Fax: [phone]    Open 24 Hours?: No      Medication refill instructions:       If your prescription bottle indicates you have medication refills left, it is not necessary to call your provider’s office. Please contact your pharmacy and they will refill your medication.    If your prescription bottle indicates you do not have any refills left, you may request refills at any time through one of the following ways: The online [employer] system (except Urgent Care), by calling your provider’s office, or by asking your pharmacy to contact your provider’s office with a refill request. Medication refills are processed only during " regular business hours and may not be available until the next business day. Your provider may request additional information or to have a follow-up visit with you prior to refilling your medication.   *Please Note: Medication refills are assigned a new Rx number when refilled electronically. Your pharmacy may indicate that no refills were authorized even though a new prescription for the same medication is available at the pharmacy. Please request the medicine by name with the pharmacy before contacting your provider for a refill.        Your To Do List     Future Labs/Procedures Complete By Expires    FREE THYROXINE  As directed 2/2/2018    TSH  As directed 2/2/2018         [employer] Access Code: Activation code not generated  Current [employer] Status: Active

## 2017-02-02 NOTE — TELEPHONE ENCOUNTER
----- Message from Sammi Lindquist sent at 2/2/2017  1:40 PM PST -----  Regarding: patient has rash and thinks it's the Entresto  EC/Ave    Patient's wife Virginia called and said he has a rash and she thinks it's his Entresto. She can be reached on cell at [phone]

## 2017-02-02 NOTE — PROGRESS NOTES
Patient with existing type diabetes:  Type 2 diabetes uncontrolled.      Patient's health status since last visit: no changes.   Issues with diabetes since last visit states blood sugars all over the place.   Current Diabetes Medications: Toujeo 25 units at hs and Humalog 25 units with meals  Metformin 1000 mg bid     HbA1c: @hba1c@  Lab Results   Component Value Date/Time    GLYCOHEMOGLOBIN 8.8 02/02/2017 11:23 AM        FSBS  Testing: testing blood sugars 4 times per day.   Fasting in the 200-300 range, lunch in the 150-200 range, dinner in the 120-240 range and hs 130-200 range.     Hypoglycemia: denies.   Exercise: none    Retinal Exam:has appointment on 2/10/17    Daily Foot Exam: checks feet and denies problems.     Flu vaccine: current.   Pneumonia vaccine unknown.

## 2017-02-02 NOTE — TELEPHONE ENCOUNTER
S/w pt and wife, states he was started on Entresto to take the place of Lisinopril and since starting the Entresto his nose is red and slightly puffy.  Pt denies swelling anywhere in mouth and states no trouble breathing.  Pt states he feels fine, but just a reddened, puffy nose and the only thing new he started was Entresto, so they weren't sure if that was the problem.  Advised pt and wife to stop medication if there becomes any breathing difficulties or swollen tongue and will discuss with EC and get back to them.  Pt v/u.    To EC to review and advise.

## 2017-02-02 NOTE — PROGRESS NOTES
Endocrinology Clinic Progress Note  PCP: Thomas Zhang M.D.    CC: Type 2 diabetes    HPI:  Chirag Balderas is a 71 y.o. old patient who comes in today for routine follow up.     Type 2 diabetes: He is currently on Toujeo 25 units at bedtime, and Humalog 20-25 units with each meal. Checks blood sugars 4 times a day. Fasting blood sugar in the morning is quite high, mostly in the range of 200-300. No fasting hypoglycemia. Pre-meal blood sugar readings are mostly close to 200s. He denies numbness or tingling in feet. Next eye exam is scheduled for February 2017. He is also on metformin 1000 mg twice a day, recent GFR was slightly lower than 60, however has been about 60 in the past. We will monitor for now.    Hypertension: Blood pressure is well controlled. He reports compliance with medications. He is on ARB.    Hyperlipidemia: LDL 90. He is on Lipitor, tolerating well. He has history of coronary artery disease.    ROS:  Constitutional: No unintentional weight loss  Endo: Denies excessive thirst or frequent urination    Past Medical History:  Patient Active Problem List    Diagnosis Date Noted   • Presence of biventricular AICD 05/02/2016     Priority: High   • Persistent atrial fibrillation (CMS-HCC) 03/04/2016     Priority: High   • Atypical atrial flutter (CMS-HCC) 03/04/2016     Priority: High   • Anticoagulant long-term use 03/04/2016     Priority: High   • Ischemic cardiomyopathy 05/14/2013     Priority: High   • Hypertriglyceridemia 05/14/2013     Priority: High   • Coronary artery disease 06/05/2012     Priority: High   • HTN (hypertension) 06/05/2012     Priority: High   • Hyperlipidemia 06/05/2012     Priority: High   • Systolic CHF (CMS-[facility]) 09/05/2014     Priority: Medium   • Sleep apnea 05/16/2013     Priority: Medium   • Type 2 diabetes mellitus without complication (CMS-HCC) 06/05/2012     Priority: Medium   • Hypothyroid 09/03/2014     Priority: Low   • Depression 02/21/2014     Priority: Low  "  • Chronic paroxysmal hemicrania, not intractable 07/09/2015   • Pulmonary nodule 09/03/2014   • ED (erectile dysfunction) 08/20/2013   • Prostate cancer screening 06/25/2013   • Colon cancer screening 06/25/2013       Medications:    Current outpatient prescriptions:   •  levothyroxine (SYNTHROID) 100 MCG Tab, Take 1 Tab by mouth Every morning on an empty stomach., Disp: 90 Tab, Rfl: 3  •  TOUJEO SOLOSTAR 300 UNIT/ML Solution Pen-injector, 25 Units every bedtime., Disp: , Rfl: 0  •  HUMALOG KWIKPEN 200 UNIT/ML Solution Pen-injector, inject 10 to 25 use subcutaneously three times a day before meals, Disp: , Rfl: 0  •  sacubitril-valsartan (ENTRESTO) 24-26 MG Tab tablet, Take 1 Tab by mouth 2 Times a Day., Disp: 60 Tab, Rfl: 11  •  furosemide (LASIX) 40 MG Tab, Take 1 Tab by mouth 1 time daily as needed., Disp: 30 Tab, Rfl: 4  •  carvedilol (COREG) 3.125 MG Tab, Take 1 Tab by mouth 2 times a day, with meals., Disp: 180 Tab, Rfl: 2  •  warfarin (COUMADIN) 5 MG Tab, Take one-half to one (1/2-1) tablet daily as directed by coumadin clinic, Disp: 90 Tab, Rfl: 1  •  atorvastatin (LIPITOR) 80 MG tablet, TAKE 1 TABLET BY MOUTH EVERY DAY., Disp: 90 Tab, Rfl: 3  •  Blood Glucose Monitoring Suppl SUPPLIES Misc, Freestyle Freedome Lite test strips. Use to test blood sugars 4-5 times daily as directed. Dx: ICD-10. E11.9, Z74.9, Disp: 500 Strip, Rfl: 3  •  Insulin Syringe-Needle U-100 (INSULIN SYRINGE .3CC/31GX5/16\") 31G X 5/16\" 0.3 ML Misc, For insulin shots 5 times a day, Disp: 200 Each, Rfl: 6  •  metformin (GLUCOPHAGE) 1000 MG tablet, TAKE 1 TABLET BY MOUTH TWICE DAILY WITH A MEAL, Disp: 180 Tab, Rfl: 1  •  glucose blood (ONE TOUCH ULTRA TEST) strip, Use to test blood sugar levels 5 times daily as directed by physician. ICD-10: E11.9, Z79.4, Disp: 500 Strip, Rfl: 5    Labs:  Results for ELY SOW LYLE (MRN [id number]) as of 2/2/2017 11:41   Ref. Range 9/29/2016 08:45 9/29/2016 13:31 10/11/2016 10:00 2/2/2017 11:23 "   Sodium Latest Ref Range: 136-145 mmol/L 141      Potassium Latest Ref Range: 3.5-5.1 mmol/L 4.3      Chloride Latest Ref Range:  mmol/L 106      Co2 Latest Ref Range: 21-32 mmol/L 26      Anion Gap Latest Ref Range: 10-18 mmol/L 13      Glucose Latest Ref Range: 74-99 mg/dL 197 (H)      Bun Latest Ref Range: 7-18 mg/dL 21 (H)      Creatinine Latest Ref Range: 0.8-1.3 mg/dL 1.3      GFR If  Latest Ref Range: >60 mL/min/1.73 m 2 >60      GFR If Non  Latest Ref Range: >60 mL/min/1.73 m 2 54 (A)      Calcium Latest Ref Range: 8.5-11.0 mg/dL 9.0      AST(SGOT) Latest Ref Range: 15-37 U/L 23      ALT(SGPT) Latest Ref Range: 12-78 U/L 26      Alkaline Phosphatase Latest Ref Range:  U/L 51      Total Bilirubin Latest Ref Range: 0.2-1.0 mg/dL 1.0      Albumin Latest Ref Range: 3.4-5.0 g/dL 3.6      Total Protein Latest Ref Range: 6.4-8.2 g/dL 7.4      A-G Ratio Unknown 0.9      Glycohemoglobin Unknown  7.6 (H)  8.8   Estim. Avg Glu Latest Units: mg/dL  171     Cholesterol,Tot Latest Ref Range: 120-200 mg/dL 172      Triglycerides Latest Ref Range: 0-150 mg/dL 240 (H)      HDL Latest Ref Range: 40.0-60.0 mg/dL 34.0 (L)      Non HDL Cholesterol Latest Ref Range:   138      LDL Latest Ref Range: <100 mg/dL 90      Chol-Hdl Ratio Unknown 5.06      Micro Alb Creat Ratio Latest Ref Range: 0-30 ug/mg   20    Creatinine, Urine Latest Units: mg/dL   168.74    Microalbumin, Urine Random Latest Ref Range: 0.0-30.0 mg/L   33.2 (H)    Results for ELY SOW (MRN [id number]) as of 2/2/2017 11:41   Ref. Range 3/16/2016 11:04 10/11/2016 10:00   TSH Latest Ref Range: 0.34-4.82 uIU/mL 8.56 (H) 7.09 (H)   Free T-4 Latest Ref Range: 0.77-1.61 ng/dL  0.77       Physical Examination:  Vital signs: /72 mmHg  Ht 1.829 m (6')  Wt 107.502 kg (237 lb)  BMI 32.14 kg/m2  General: No apparent distress, cooperative  Eyes: No scleral icterus, no discharge   Resp: Normal effort, clear to  auscultation bilaterally  CVS: Regular rate and rhythm, S1 S2 normal, no murmur  Extremities: No edema  Skin: No rash  Psych: Alert and oriented, normal mood and affect    Assessment and Plan:    Type 2 diabetes mellitus with hyperglycemia, with long-term current use of insulin (HCC)  · Hemoglobin A1c today in the clinic is 8.8%  · Goal hemoglobin A1c less than 7-7.5%  · Increase Toujeo insulin to 30 units at bedtime, and we discussed treat to target recommendations  · Continue Humalog 20-25 units with each meal  · Continue metformin 1000 mg twice a day; if repeat labs show GFR less than 60 we will lower the dose to 500 mg twice a day    Essential hypertension  · Blood pressure is well controlled   · Continue ARB, in addition to other antihypertensive agents     Mixed hyperlipidemia  · Continue Lipitor  · LDL 90    Acquired hypothyroidism  · Last set of labs showed TSH of 7, on levothyroxine 75 µg daily  · Increased dose of levothyroxine 200 µg daily, and advised to repeat labs in 6 weeks  -     TSH; Future  -     FREE THYROXINE; Future    Return in about 3 months (around 5/2/2017).    Thank you for allowing me to participate in the care of this patient.    Indira Herrera M.D.  10/05/2016    CC:   Thomas Zhang M.D.    This note was created using voice recognition software (Dragon). The accuracy of the dictation is limited by the abilities of the software. I have reviewed the note prior to signing, however some errors in grammar and context are still possible. If you have any questions related to this note please do not hesitate to contact our office.

## 2017-02-04 NOTE — TELEPHONE ENCOUNTER
Called and spoke with patients wife.   Called to check how his nose is doing in regards to the redness and slight puffiness.     Patients wife said that the redness on his nose is better and puffiness is better.  She will continue to watch over the weekend and call on Monday with any changes.    She states that he is feeling fine and does know again if he has any shortness of breath or difficulty swallowing will stop the Entresto and use ER if needed.  She does feel it is better.  Above to Dr. Caatlan. jlf    Dr. Catalan reviewed and advised also to stay on the Entresto and to call in one week to let us know how he is doing.   Wife notified of the same. jlf

## 2017-02-13 ENCOUNTER — ANTICOAGULATION MONITORING (OUTPATIENT)
Dept: VASCULAR LAB | Facility: MEDICAL CENTER | Age: 72
End: 2017-02-13

## 2017-02-13 LAB — INR PPP: 1.6 (ref 2–3.5)

## 2017-02-13 NOTE — PROGRESS NOTES
Anticoagulation Summary as of 2/13/2017     INR goal 2.0-3.0    Selected INR 1.6! (2/13/2017)    Maintenance plan 2.5 mg (5 mg x 0.5) on Mon, Wed, Fri; 5 mg (5 mg x 1) all other days    Weekly total 27.5 mg    Plan last modified Cherelle Nixon PHARMD (3/30/2016)    Next INR check 2/27/2017    Target end date Indefinite      Anticoagulation Episode Summary     INR check location Outside Lab    Preferred lab     Send INR reminders to     Comments       Anticoagulation Care Providers     Provider Role Specialty Phone number    Zain Winslow M.D. Referring Cardiac Electrophysiology [phone]    ROSANA LimaD Responsible          Anticoagulation Patient Findings   Negatives Missed Doses, Extra Doses, Medication Changes, Antibiotic Use, Diet Changes, Dental/Other Procedures, Hospitalization, Bleeding Gums, Nose Bleeds, Blood in Urine, Blood in Stool, Any Bruising, Other Complaints        Spoke with patient today regarding subtherapeutic INR of 1.6.  Patient denies any signs/symptoms of bruising or bleeding or any changes in diet and medications.  Instructed patient to call clinic with any questions or concerns.  Instructed patient to bolus with 5mg X 1, then resume current warfarin regimen.  Follow up in 2 weeks.    Rafat Marcial, ROSANAD

## 2017-02-16 NOTE — TELEPHONE ENCOUNTER
Wife calls back happy to report nasal puffiness & redness completely gone. Pt. will continue Entresto. Asks if he's to remain on furosemide and I told her yes.

## 2017-02-21 RX ORDER — INSULIN GLARGINE 300 U/ML
25 INJECTION, SOLUTION SUBCUTANEOUS
Qty: 4.5 ML | Refills: 3 | Status: SHIPPED | OUTPATIENT
Start: 2017-02-21 | End: 2017-05-24

## 2017-03-01 DIAGNOSIS — I48.19 PERSISTENT ATRIAL FIBRILLATION (HCC): ICD-10-CM

## 2017-03-01 RX ORDER — WARFARIN SODIUM 5 MG/1
TABLET ORAL
Qty: 90 TAB | Refills: 1 | Status: SHIPPED | OUTPATIENT
Start: 2017-03-01 | End: 2017-12-19 | Stop reason: SDUPTHER

## 2017-03-01 RX ORDER — WARFARIN SODIUM 5 MG/1
TABLET ORAL
Qty: 90 TAB | Refills: 1 | OUTPATIENT
Start: 2017-03-01

## 2017-03-02 LAB — INR PPP: 3 (ref 2–3.5)

## 2017-03-03 ENCOUNTER — ANTICOAGULATION MONITORING (OUTPATIENT)
Dept: VASCULAR LAB | Facility: MEDICAL CENTER | Age: 72
End: 2017-03-03

## 2017-03-04 NOTE — PROGRESS NOTES
Anticoagulation Summary as of 3/3/2017     INR goal 2.0-3.0    Selected INR 3.0 (3/2/2017)    Maintenance plan 2.5 mg (5 mg x 0.5) on Mon, Wed, Fri; 5 mg (5 mg x 1) all other days    Weekly total 27.5 mg    Plan last modified Cherelle Nixon PHARMD (3/30/2016)    Next INR check 3/16/2017    Target end date Indefinite      Anticoagulation Episode Summary     INR check location Outside Lab    Preferred lab     Send INR reminders to     Comments       Anticoagulation Care Providers     Provider Role Specialty Phone number    Zain Winslow M.D. Referring Cardiac Electrophysiology [phone]    Cherelle Nixon PHARMD Responsible          Spoke with Chirag to report a therapeutic INR of 3.0.. Pt is to continue with current warfarin dosing regimen.  Pt denies any unusual s/s of bleeding, bruising, clotting or any changes to diet or medications.  Follow up in 2 weeks.    Cherelle Nixon PHARMD

## 2017-03-09 RX ORDER — INSULIN LISPRO 200 [IU]/ML
20-30 INJECTION, SOLUTION SUBCUTANEOUS
Qty: 30 ML | Refills: 3 | Status: SHIPPED | OUTPATIENT
Start: 2017-03-09 | End: 2017-05-24

## 2017-03-10 ENCOUNTER — TELEPHONE (OUTPATIENT)
Dept: ENDOCRINOLOGY | Facility: MEDICAL CENTER | Age: 72
End: 2017-03-10

## 2017-03-10 NOTE — TELEPHONE ENCOUNTER
Called Pt and leave a detailed message in regards to his BS reading which as per Dr. Herrera mentioned that increase Toujeo to 40 units at bedtime.    Thank you  Aga

## 2017-03-14 ENCOUNTER — ANTICOAGULATION MONITORING (OUTPATIENT)
Dept: VASCULAR LAB | Facility: MEDICAL CENTER | Age: 72
End: 2017-03-14

## 2017-03-14 LAB — INR PPP: 4.9 (ref 2–3.5)

## 2017-03-14 NOTE — PROGRESS NOTES
Anticoagulation Summary as of 3/14/2017     INR goal 2.0-3.0    Selected INR 4.9! (3/14/2017)    Maintenance plan 2.5 mg (5 mg x 0.5) on Mon, Wed, Fri; 5 mg (5 mg x 1) all other days    Weekly total 27.5 mg    Plan last modified Cherelle Nixon, PHARMD (3/30/2016)    Next INR check 3/21/2017    Target end date Indefinite      Anticoagulation Episode Summary     INR check location Outside Lab    Preferred lab     Send INR reminders to     Comments       Anticoagulation Care Providers     Provider Role Specialty Phone number    Zain Winslow M.D. Referring Cardiac Electrophysiology [phone]    Cherelle Nixon, PHARMD Responsible          Anticoagulation Patient Findings    Pt is supratherapeutic today. Denies any changes in medications or diet. Med rec completed and reviewed. Patient denies any s/sx of bleeding or clotting. Will HOLD tonight's dose then continue dosing as outlined in calendar. Will follow-up with pt in 1 week.\    Salma BAILEY

## 2017-03-15 DIAGNOSIS — I48.92 ATRIAL FLUTTER, UNSPECIFIED TYPE (HCC): ICD-10-CM

## 2017-03-21 ENCOUNTER — ANTICOAGULATION MONITORING (OUTPATIENT)
Dept: VASCULAR LAB | Facility: MEDICAL CENTER | Age: 72
End: 2017-03-21

## 2017-03-21 LAB — INR PPP: 3.8 (ref 2–3.5)

## 2017-03-21 NOTE — PROGRESS NOTES
Anticoagulation Summary as of 3/21/2017     INR goal 2.0-3.0    Selected INR 3.8! (3/21/2017)    Maintenance plan 5 mg (5 mg x 1) on Sun, Tue, Thu; 2.5 mg (5 mg x 0.5) all other days    Weekly total 25 mg    Plan last modified Rafat Marcial PHARMD (3/21/2017)    Next INR check 3/28/2017    Target end date Indefinite      Anticoagulation Episode Summary     INR check location Outside Lab    Preferred lab     Send INR reminders to     Comments       Anticoagulation Care Providers     Provider Role Specialty Phone number    Zain Winslow M.D. Referring Cardiac Electrophysiology [phone]    Cherelle Nixon PHARMD Responsible          Anticoagulation Patient Findings   Negatives Missed Doses, Extra Doses, Medication Changes, Antibiotic Use, Diet Changes, Dental/Other Procedures, Hospitalization, Bleeding Gums, Nose Bleeds, Blood in Urine, Blood in Stool, Any Bruising, Other Complaints        Spoke with patient today regarding supratherapeutic INR of 3.8.  Patient denies any signs/symptoms of bruising or bleeding or any changes in diet and medications.  Instructed patient to call clinic with any questions or concerns.  Instructed patient to HOLD X 1, then decrease weekly warfarin regimen by ~9% as detailed above.  Follow up in 1 weeks.    Rafat Marcial, MEHRAN

## 2017-03-24 ENCOUNTER — TELEPHONE (OUTPATIENT)
Dept: CARDIOLOGY | Facility: MEDICAL CENTER | Age: 72
End: 2017-03-24

## 2017-03-24 NOTE — TELEPHONE ENCOUNTER
----- Message from Sammi Lindquist sent at 3/24/2017 12:59 PM PDT -----  Regarding: wife wants to talk to you about his Entresto  EC/Breezy      Patient's wife Virginia wants to speak to you about his medication, Entresto. She can be reached at [phone]

## 2017-03-24 NOTE — TELEPHONE ENCOUNTER
Patient's wife called to report that Entresto rx cost them $120 this month and was only $45 last month - called patient's insurance and found out that they are now in the gap and they will have to pay 51% of generic medications until they get out of the donut hole.  Notified patient of why the change in cost gave wife contact info for [employer] patient assistance and also told her she could apply for low income subsidy through social security office.  She is going to come and get a 28 day supply of Entresto samples and work on these options.  If they do not qualify for either then we will need to have Dr. Catalan change medication possibly because they can not afford that.  They have to meet $4,950 before hitting catastrophic and getting out of the hold and they have only met $647.73.  She will keep me updated on patient assistance progress.

## 2017-03-29 ENCOUNTER — ANTICOAGULATION MONITORING (OUTPATIENT)
Dept: VASCULAR LAB | Facility: MEDICAL CENTER | Age: 72
End: 2017-03-29

## 2017-03-29 LAB — INR PPP: 3.9 (ref 2–3.5)

## 2017-03-29 NOTE — PROGRESS NOTES
Anticoagulation Summary as of 3/29/2017     INR goal 2.0-3.0    Selected INR 3.9! (3/29/2017)    Maintenance plan 5 mg (5 mg x 1) on Sun, Tue, Thu; 2.5 mg (5 mg x 0.5) all other days    Weekly total 25 mg    Plan last modified Rafat Marcial PHARMD (3/21/2017)    Next INR check 4/5/2017    Target end date Indefinite      Anticoagulation Episode Summary     INR check location Outside Lab    Preferred lab     Send INR reminders to     Comments       Anticoagulation Care Providers     Provider Role Specialty Phone number    Zain Winslow M.D. Referring Cardiac Electrophysiology [phone]    ROSANA LimaD Responsible          Anticoagulation Patient Findings    Patient's INR was SUPRAtherapeutic.   Denies any unusual s/s of bleeding, bruising, clotting.  Denies any changes to:   Diet   Medications  Confirmed dosing regimen. Pt did not make changes requested at last visit (decrease regimen.   Pt is to continue with current (decreased) warfarin dosing regimen.    Follow up in 1 week(s)    Trevor Winston, PHARMD

## 2017-03-31 ENCOUNTER — TELEPHONE (OUTPATIENT)
Dept: CARDIOLOGY | Facility: MEDICAL CENTER | Age: 72
End: 2017-03-31

## 2017-03-31 NOTE — TELEPHONE ENCOUNTER
----- Message from Sammi Lindquist sent at 3/31/2017  3:09 PM PDT -----  Regarding: [facility] calling about patient's Entresto  EC/Bradly Hernández called from [employer] and said patient has been approved for 90 days of Entresto free. He would like a call back on Monday 04/03 at [phone].

## 2017-04-05 ENCOUNTER — TELEPHONE (OUTPATIENT)
Dept: ENDOCRINOLOGY | Facility: MEDICAL CENTER | Age: 72
End: 2017-04-05

## 2017-04-05 NOTE — TELEPHONE ENCOUNTER
My chart message left last week and not read by patient.  Patient now called and message left for him to increase his Toujeo insulin to 45 units at hs.

## 2017-04-10 ENCOUNTER — ANTICOAGULATION MONITORING (OUTPATIENT)
Dept: VASCULAR LAB | Facility: MEDICAL CENTER | Age: 72
End: 2017-04-10

## 2017-04-10 LAB — INR PPP: 1.8 (ref 2–3.5)

## 2017-04-10 NOTE — PROGRESS NOTES
Anticoagulation Summary as of 4/10/2017     INR goal 2.0-3.0    Selected INR 1.8! (4/10/2017)    Maintenance plan 5 mg (5 mg x 1) on Sun, Tue, Thu; 2.5 mg (5 mg x 0.5) all other days    Weekly total 25 mg    Plan last modified Rafat Marcial PHARMD (3/21/2017)    Next INR check 4/17/2017    Target end date Indefinite      Anticoagulation Episode Summary     INR check location Outside Lab    Preferred lab     Send INR reminders to     Comments       Anticoagulation Care Providers     Provider Role Specialty Phone number    Zain Winslow M.D. Referring Cardiac Electrophysiology [phone]    ROSANA LimaD Responsible          Spoke to patient on phone. Warfarin 5 mg tonight then resume previously prescribed regimen. Follow up INR in one week.    Jose Phillips, PHARMD

## 2017-04-18 ENCOUNTER — OFFICE VISIT (OUTPATIENT)
Dept: CARDIOLOGY | Facility: CLINIC | Age: 72
End: 2017-04-18
Payer: MEDICARE

## 2017-04-18 VITALS
BODY MASS INDEX: 31.69 KG/M2 | HEART RATE: 70 BPM | WEIGHT: 234 LBS | SYSTOLIC BLOOD PRESSURE: 140 MMHG | HEIGHT: 72 IN | DIASTOLIC BLOOD PRESSURE: 70 MMHG | OXYGEN SATURATION: 93 %

## 2017-04-18 DIAGNOSIS — R60.9 EDEMA, UNSPECIFIED TYPE: ICD-10-CM

## 2017-04-18 DIAGNOSIS — I48.19 PERSISTENT ATRIAL FIBRILLATION (HCC): ICD-10-CM

## 2017-04-18 DIAGNOSIS — Z95.810 PRESENCE OF BIVENTRICULAR AICD: ICD-10-CM

## 2017-04-18 DIAGNOSIS — I25.5 CARDIOMYOPATHY, ISCHEMIC: ICD-10-CM

## 2017-04-18 DIAGNOSIS — Z79.01 ANTICOAGULANT LONG-TERM USE: ICD-10-CM

## 2017-04-18 DIAGNOSIS — E11.9 TYPE 2 DIABETES MELLITUS WITHOUT COMPLICATION, UNSPECIFIED LONG TERM INSULIN USE STATUS: ICD-10-CM

## 2017-04-18 DIAGNOSIS — I25.10 CORONARY ARTERY DISEASE DUE TO LIPID RICH PLAQUE: ICD-10-CM

## 2017-04-18 DIAGNOSIS — I25.83 CORONARY ARTERY DISEASE DUE TO LIPID RICH PLAQUE: ICD-10-CM

## 2017-04-18 PROCEDURE — 3045F PR MOST RECENT HEMOGLOBIN A1C LEVEL 7.0-9.0%: CPT | Performed by: INTERNAL MEDICINE

## 2017-04-18 PROCEDURE — G8432 DEP SCR NOT DOC, RNG: HCPCS | Performed by: INTERNAL MEDICINE

## 2017-04-18 PROCEDURE — 1101F PT FALLS ASSESS-DOCD LE1/YR: CPT | Mod: 8P | Performed by: INTERNAL MEDICINE

## 2017-04-18 PROCEDURE — 1036F TOBACCO NON-USER: CPT | Performed by: INTERNAL MEDICINE

## 2017-04-18 PROCEDURE — G8417 CALC BMI ABV UP PARAM F/U: HCPCS | Performed by: INTERNAL MEDICINE

## 2017-04-18 PROCEDURE — G8598 ASA/ANTIPLAT THER USED: HCPCS | Performed by: INTERNAL MEDICINE

## 2017-04-18 PROCEDURE — 4040F PNEUMOC VAC/ADMIN/RCVD: CPT | Performed by: INTERNAL MEDICINE

## 2017-04-18 PROCEDURE — 99214 OFFICE O/P EST MOD 30 MIN: CPT | Performed by: INTERNAL MEDICINE

## 2017-04-18 RX ORDER — LISINOPRIL 20 MG/1
20 TABLET ORAL DAILY
Qty: 90 TAB | Refills: 3 | Status: SHIPPED | OUTPATIENT
Start: 2017-04-18 | End: 2018-05-14 | Stop reason: SDUPTHER

## 2017-04-18 NOTE — PROGRESS NOTES
Chief Complaint   Patient presents with   • Follow-Up       This patient is an established male who is here today to discuss:  Recheck CM; Unable to afford Entresto;; Refill ACEI;     Patient Active Problem List    Diagnosis Date Noted   • Presence of biventricular AICD 05/02/2016     Priority: High   • Persistent atrial fibrillation (CMS-HCC) 03/04/2016     Priority: High   • Atypical atrial flutter (CMS-HCC) 03/04/2016     Priority: High   • Anticoagulant long-term use 03/04/2016     Priority: High   • Ischemic cardiomyopathy 05/14/2013     Priority: High   • Hypertriglyceridemia 05/14/2013     Priority: High   • Coronary artery disease 06/05/2012     Priority: High   • HTN (hypertension) 06/05/2012     Priority: High   • Hyperlipidemia 06/05/2012     Priority: High   • Systolic CHF (CMS-HCC) 09/05/2014     Priority: Medium   • Sleep apnea 05/16/2013     Priority: Medium   • Type 2 diabetes mellitus without complication (CMS-[facility]) 06/05/2012     Priority: Medium   • Hypothyroid 09/03/2014     Priority: Low   • Depression 02/21/2014     Priority: Low   • Chronic paroxysmal hemicrania, not intractable 07/09/2015   • Pulmonary nodule 09/03/2014   • ED (erectile dysfunction) 08/20/2013   • Prostate cancer screening 06/25/2013   • Colon cancer screening 06/25/2013       Past Medical History   Diagnosis Date   • Coronary artery disease 2002     History of remote MI/stent   • HTN (hypertension)     • Hyperlipidemia     • Myocardial infarction (CMS-HCC) 2002   • Ischemic cardiomyopathy March 2015     Echocardiogram with LVEF 30-40%. Mild-moderate septal hypertrophy. Moderately enlarged LA.  Moderate-severely enlarged RA. Mild AR. Mild MR. Mild TR, RVSP 5-10mmHg.   • Systolic CHF (CMS-[facility])     • DIABETES MELLITUS      On insulin   • Chronic anticoagulation          • Atrial fibrillation, persistent (CMS-HCC)          • Dental disorder      Dentures   • COPD (chronic obstructive pulmonary disease) (CMS-HCC)    • Supplemental  "oxygen dependent    • Sleep apnea      Uses CPAP   • Breath shortness      On oxygen   • Cataract      Bilateral IOL   • Hypothyroidism    • Depression      Past Surgical History   Procedure Laterality Date   • Cataract extraction with iol Left 1980         • Cataract extraction with iol Right 2000         • Cardiac cath  2002   • Other neurological surg  03/2013     Laminectomy lumbar   • Recovery  3/18/2016     Procedure: CATH LAB BIV ICD INSERT ST.JUDE WINSLOW;  Surgeon: Recoveryonly Surgery;  [place] [facility] [place]:    • Aicd implant  March 2016     [facility] Quadra Assura [id number] CRT-D implanted by Dr. Winslow.       Current Outpatient Prescriptions   Medication Sig Dispense Refill   • lisinopril (PRINIVIL) 20 MG Tab Take 1 Tab by mouth every day. 90 Tab 3   • metformin (GLUCOPHAGE) 1000 MG tablet take 1 tablet by mouth twice a day WITH A MEAL 180 Tab 3   • atorvastatin (LIPITOR) 80 MG tablet Take 1 Tab by mouth every day. 90 Tab 3   • HUMALOG KWIKPEN 200 UNIT/ML Solution Pen-injector Inject 20-30 Units as instructed every bedtime. 30 mL 3   • warfarin (COUMADIN) 5 MG Tab Take one-half to one tablet by mouth one time daily as directed by anticoagulation clinic 90 Tab 1   • TOUJEO SOLOSTAR 300 UNIT/ML Solution Pen-injector Inject 25 Units as instructed every bedtime. 4.5 mL 3   • levothyroxine (SYNTHROID) 100 MCG Tab Take 1 Tab by mouth Every morning on an empty stomach. 90 Tab 3   • Blood Glucose Monitoring Suppl SUPPL[place] [facility] [place] Freestyle Freed[place] Lite test strips. Use to test blood sugars 4-5 times daily as directed. Dx: ICD-10. E11.9, Z74.9 500 Strip 3   • furosemide (LASIX) 40 MG Tab Take 1 Tab by mouth 1 time daily as needed. 30 Tab 4   • carvedilol (COREG) 3.125 MG Tab Take 1 Tab by mouth 2 times a day, with meals. 180 Tab 2   • Insulin Syringe-Needle U-100 (INSULIN SYRINGE .3CC/31GX5/16\") 31G X 5/16\" 0.3 ML Misc For insulin shots 5 times a day 200 Each 6   • glucose blood " "(ONE TOUCH ULTRA TEST) strip Use to test blood sugar levels 5 times daily as directed by physician. ICD-10: E11.9, Z79.4 500 Strip 5     No current facility-administered medications for this visit.     Pcn      Review of Systems:     Constitutional: Denies fevers, Denies weight changes  Eyes: Denies changes in vision, no eye pain  Ears/Nose/Throat/Mouth: Denies nasal congestion or sore throat   Cardiovascular: Denies chest pain or palpitations   Respiratory: Chjronic shortness of breath , Denies cough  Gastrointestinal/Hepatic: Denies abdominal pain, nausea, vomiting, diarrhea, constipation or GI bleeding   Genitourinary: Denies bladder dysfunction, dysuria or frequency  Musculoskeletal/Rheum: Denies  joint pain and swelling   Skin/Breast: Denies rash, denies breast lumps or discharge  Neurological: Denies headache, confusion, memory loss or focal weakness/parasthesias  Psychiatric: denies mood disorder   Endocrine: denies hx of diabetes or thyroid dysfunction  Heme/Oncology/Lymph Nodes: Denies enlarged lymph nodes, denies brusing or known bleeding disorder  Allergic/Immunologic:  hx of allergies      All other systems were reviewed and are negative (AMA/CMS criteria)      Blood pressure 140/70, pulse 70, height 1.829 m (6' 0.01\"), weight 106.142 kg (234 lb), SpO2 93 %.  HENT: Normocephalic, Atraumatic, Oropharynx moist mucous membranes, Dentition: , Nose normal. Mallampati 4 OP    Eyes: PERRLA, EOMI, Conjunctiva normal, No discharge.    Neck:Large; Normal range of motion, No cervical tenderness, Supple, No stridor, no JVD. No thyromegaly. No carotid bruit.    Cardiovascular: Normal heart rate, Normal rhythm, nl S1, S2, no S3, butS4; No gallops; 1-2/6 SE murmurs at AoV, MV and TV, No rubs, . Extremitites with intact distal pulses, no cyanosis, clubbing but trace edema. No heaves, thrills, HJR;  Left pectoral BiV/AICD site healing w/o bleeding/redness  Peripheral pulses: carotid 2+, brachial 2+, radial 2+, ulnar 2+, " femoral ?, popliteal 1+, PT 1+, DP 1+;    Lungs: Respiratory effort is normal. Diminished breath sounds, breath sounds clear to auscultation bilaterally, no rales, no rhonchi, no wheezing.    Abdomen: large; Bowel sounds normal, Soft, No tenderness, No guarding, No rebound, No masses, No hepatosplenomegaly.    Skin: Warm, Dry, No erythema, No rash, no induration or crepitus.    Neurologic: Alert & oriented x 3, Normal motor function, Normal sensory function, No focal deficits noted, cranial nerves II through XII are normal, normal gait.    Psychiatric: Affect normal, Judgment normal, Mood normal    Results for ELY SOW (MRN [id number]) as of 4/18/2017 13:46   Ref. Range 4/10/2017 00:00 4/12/2017 08:40 4/12/2017 13:31   WBC Latest Ref Range: 4.8-10.8 K/uL  9.1    RBC Latest Ref Range: 4.70-6.10 M/uL  5.21    Hemoglobin Latest Ref Range: 14.0-18.0 g/dL  15.4    Hematocrit Latest Ref Range: 42.0-52.0 %  47.1    MCV Latest Ref Range: 80.0-94.0 fL  90.4    MCH Latest Ref Range: 27.0-31.0 pg  29.6    MCHC Latest Ref Range: 33.0-37.0 g/dL  32.7 (L)    RDW Latest Ref Range: 11.5-14.5 %  13.4    Platelet Count Latest Ref Range: 130-400 K/uL  313    MPV Latest Ref Range: 7.4-10.4 fL  10.9 (H)    Sodium Latest Ref Range: 136-145 mmol/L  136    Potassium Latest Ref Range: 3.5-5.1 mmol/L  4.7    Chloride Latest Ref Range:  mmol/L  104    Co2 Latest Ref Range: 21-32 mmol/L  24    Anion Gap Latest Ref Range: 10-18 mmol/L  13    Glucose Latest Ref Range: 74-99 mg/dL  238 (H)    Bun Latest Ref Range: 7-18 mg/dL  20 (H)    Creatinine Latest Ref Range: 0.8-1.3 mg/dL  1.3    GFR If  Latest Ref Range: >60 mL/min/1.73 m 2  >60    GFR If Non  Latest Ref Range: >60 mL/min/1.73 m 2  54 (A)    Calcium Latest Ref Range: 8.5-11.0 mg/dL  8.8    AST(SGOT) Latest Ref Range: 15-37 U/L  20    ALT(SGPT) Latest Ref Range: 12-78 U/L  32    Alkaline Phosphatase Latest Ref Range:  U/L  56    Total  Bilirubin Latest Ref Range: 0.2-1.0 mg/dL  0.6    Albumin Latest Ref Range: 3.4-5.0 g/dL  3.3 (L)    Total Protein Latest Ref Range: 6.4-8.2 g/dL  7.2    A-G Ratio Unknown  0.8    Glycohemoglobin Latest Ref Range: <6.0 %   8.1 (H)   Estim. Avg Glu Latest Units: mg/dL   186   Cholesterol,Tot Latest Ref Range: 120-200 mg/dL  175    Triglycerides Latest Ref Range: 0-150 mg/dL  188 (H)    HDL Latest Ref Range: 40.0-60.0 mg/dL  38.0 (L)    Non HDL Cholesterol Latest Ref Range:    137    LDL Latest Ref Range: <100 mg/dL  99    Chol-Hdl Ratio Unknown  4.61    INR Unknown 1.8     TSH Latest Ref Range: 0.36-3.74 uIU/mL  2.09      Assessment and Plan.   71 y.o. male has CM and mod to severly reduced LVEF; Recheck as per order;     1. Coronary artery disease due to lipid rich plaque    - lisinopril (PRINIVIL) 20 MG Tab; Take 1 Tab by mouth every day.  Dispense: 90 Tab; Refill: 3    2. Presence of biventricular AICD  No firing    3. Persistent atrial fibrillation (CMS-HCC)  stable    4. Anticoagulant long-term use  Warfarin, no bleeding    5. Type 2 diabetes mellitus without complication, unspecified long term insulin use status (CMS-[facility])  PCP  - lisinopril (PRINIVIL) 20 MG Tab; Take 1 Tab by mouth every day.  Dispense: 90 Tab; Refill: 3    6. Edema, unspecified type  gone    7. Cardiomyopathy, ischemic  Echo recheck  - lisinopril (PRINIVIL) 20 MG Tab; Take 1 Tab by mouth every day.  Dispense: 90 Tab; Refill: 3      1. Coronary artery disease due to lipid rich plaque  lisinopril (PRINIVIL) 20 MG Tab   2. Presence of biventricular AICD     3. Persistent atrial fibrillation (CMS-HCC)     4. Anticoagulant long-term use     5. Type 2 diabetes mellitus without complication, unspecified long term insulin use status (CMS-[facility])  lisinopril (PRINIVIL) 20 MG Tab   6. Edema, unspecified type     7. Cardiomyopathy, ischemic  lisinopril (PRINIVIL) 20 MG Tab

## 2017-04-18 NOTE — MR AVS SNAPSHOT
"        Chirag Balderas   2017 1:20 PM   Office Visit   MRN: [id number]    Department:  Heart [facility]   Dept Phone:  [phone]    Description:  Male : 1945   Provider:  Monika Catalan MD,[facility]           Reason for Visit     Follow-Up           Allergies as of 2017     Allergen Noted Reactions    Pcn [Penicillins] 2012       swelling      You were diagnosed with     Coronary artery disease due to lipid rich plaque   [0817130]       Presence of biventricular AICD   [709014]       Persistent atrial fibrillation (CMS-HCC)   [116365]       Anticoagulant long-term use   [340821]       Type 2 diabetes mellitus without complication, unspecified long term insulin use status (CMS-HCC)   [1890834]       Edema, unspecified type   [4667542]       Cardiomyopathy, ischemic   [650378]         Vital Signs     Blood Pressure Pulse Height Weight Body Mass Index Oxygen Saturation    140/70 mmHg 70 1.829 m (6' 0.01\") 106.142 kg (234 lb) 31.73 kg/m2 93%    Smoking Status                   Former Smoker           Basic Information     Date Of Birth Sex Race Ethnicity Preferred Language    1945 Male White Non- English      Your appointments     May 24, 2017 10:20 AM   Diabetes Care Visit with Indira Herrera M.D., ENDOCRINOLOGY DIABETES RN   [facility]    [address], [address]  [facility] [address] [phone]           You will be receiving a confirmation call a few days before your appointment from our automated call confirmation system.            Oct 26, 2017 12:20 PM   FOLLOW UP with Monika Catalan MD,[facility]    [address] [address]  [address]  [facility] [address] [phone]              Problem List              ICD-10-CM Priority Class Noted - Resolved    Coronary artery disease I25.10 High  2012 - Present    HTN (hypertension) I10 High  2012 - Present   "    Type 2 diabetes mellitus without complication (CMS-HCC) E11.9 Medium  6/5/2012 - Present    Hyperlipidemia E78.5 High  6/5/2012 - Present    Ischemic cardiomyopathy I25.5 High  5/14/2013 - Present    Hypertriglyceridemia E78.1 High  5/14/2013 - Present    Sleep apnea G47.30 Medium  5/16/2013 - Present    Prostate cancer screening Z12.5   6/25/2013 - Present    Colon cancer screening Z12.11   6/25/2013 - Present    ED (erectile dysfunction) N52.9   8/20/2013 - Present    Depression F32.9 Low  2/21/2014 - Present    Hypothyroid E03.9 Low  9/3/2014 - Present    Pulmonary nodule R91.1   9/3/2014 - Present    Systolic CHF (CMS-HCC) I50.20 Medium  9/5/2014 - Present    Chronic paroxysmal hemicrania, not intractable G44.049   7/9/2015 - Present    Persistent atrial fibrillation (CMS-HCC) I48.1 High  3/4/2016 - Present    Atypical atrial flutter (CMS-HCC) I48.4 High  3/4/2016 - Present    Anticoagulant long-term use Z79.01 High  3/4/2016 - Present    Presence of biventricular AICD Z95.810 High  5/2/2016 - Present      Health Maintenance        Date Due Completion Dates    IMM DTaP/Tdap/Td Vaccine (1 - Tdap) 9/8/1963 ---    IMM ZOSTER VACCINE 9/8/2004 ---    IMM PNEUMOCOCCAL 65+ (ADULT) LOW/MEDIUM RISK SERIES (2 of 2 - PCV13) 10/8/2013 10/8/2012    DIABETES MONOFILAMENT / LE EXAM 10/5/2017 10/5/2016, 5/7/2015 (N/S), 3/11/2014 (N/S)    Override on 5/7/2015: (N/S)    Override on 3/11/2014: (N/S)    URINE ACR / MICROALBUMIN 10/11/2017 10/11/2016, 11/10/2015, 3/5/2014, 6/20/2013, 6/19/2012    A1C SCREENING 10/12/2017 4/12/2017, 2/2/2017, 9/29/2016, 5/31/2016, 4/19/2016, 11/16/2015, 11/16/2015 (N/S), 8/10/2015, 5/7/2015, 5/7/2015 (N/S), 12/3/2014, 12/3/2014 (N/S), 9/3/2014, 6/11/2014, 6/11/2014 (N/S), 3/11/2014, 3/11/2014 (N/S), 12/3/2013, 12/3/2013 (N/S), 6/25/2013, 3/26/2013, 12/19/2012, 10/8/2012, 7/3/2012    Override on 11/16/2015: (N/S)    Override on 5/7/2015: (N/S)    Override on 12/3/2014: (N/S)    Override on  6/11/2014: (N/S)    Override on 3/11/2014: (N/S)    Override on 12/3/2013: (N/S)    RETINAL SCREENING 2/20/2018 2/20/2017 (N/S), 2/1/2016, 2/1/2016, 8/26/2015 (Done), 11/1/2014 (N/S), 12/19/2013 (N/S)    Override on 2/20/2017: (N/S)    Override on 8/26/2015: Done (Dr. Baez)    Override on 11/1/2014: (N/S)    Override on 12/19/2013: (N/S)    FASTING LIPID PROFILE 4/12/2018 4/12/2017, 9/29/2016, 11/10/2015, 1/5/2015, 10/9/2014, 3/5/2014, 6/20/2013, 6/19/2012    SERUM CREATININE 4/12/2018 4/12/2017, 9/29/2016, 3/19/2016, 3/16/2016, 11/10/2015, 6/29/2015, 4/28/2015, 1/30/2015, 1/5/2015, 10/9/2014, 8/26/2014, 8/25/2014, 8/24/2014, 8/23/2014, 8/22/2014, 3/5/2014, 6/20/2013, 5/17/2013, 6/19/2012    COLONOSCOPY 6/11/2024 6/11/2014 (Declined)    Override on 6/11/2014: Patient Declined            Current Immunizations     Influenza TIV (IM) 10/15/2014, 10/3/2013, 10/8/2012    Influenza Vaccine Adult HD 10/10/2016 10:07 AM    Influenza Vaccine Quad Inj (Preserved) 11/16/2015 10:26 AM    Pneumococcal polysaccharide vaccine (PPSV-23) 10/8/2012      Below and/or attached are the medications your provider expects you to take. Review all of your home medications and newly ordered medications with your provider and/or pharmacist. Follow medication instructions as directed by your provider and/or pharmacist. Please keep your medication list with you and share with your provider. Update the information when medications are discontinued, doses are changed, or new medications (including over-the-counter products) are added; and carry medication information at all times in the event of emergency situations     Allergies:  PCN - (reactions not documented)               Medications  Valid as of: April 18, 2017 -  2:06 PM    Generic Name Brand Name Tablet Size Instructions for use    Atorvastatin Calcium (Tab) LIPITOR 80 MG Take 1 Tab by mouth every day.        Blood Glucose Monitoring Suppl (Misc) Blood Glucose Monitoring Suppl SUPPLIES  "Freestyle Freedome Lite test strips. Use to test blood sugars 4-5 times daily as directed. Dx: ICD-10. E11.9, Z74.9        Carvedilol (Tab) COREG 3.125 MG Take 1 Tab by mouth 2 times a day, with meals.        Furosemide (Tab) LASIX 40 MG Take 1 Tab by mouth 1 time daily as needed.        Glucose Blood (Strip) glucose blood  Use to test blood sugar levels 5 times daily as directed by physician. ICD-10: E11.9, Z79.4        Insulin Glargine (Solution Pen-injector) TOUJEO SOLOSTAR 300 UNIT/ML Inject 25 Units as instructed every bedtime.        Insulin Lispro (Solution Pen-injector) HUMALOG KWIKPEN 200 UNIT/ML Inject 20-30 Units as instructed every bedtime.        Insulin Syringe-Needle U-100 (Misc) INSULIN SYRINGE .3CC/31GX5/16\" 31G X 5/16\" 0.3 ML For insulin shots 5 times a day        Levothyroxine Sodium (Tab) SYNTHROID 100 MCG Take 1 Tab by mouth Every morning on an empty stomach.        Lisinopril (Tab) PRINIVIL 20 MG Take 1 Tab by mouth every day.        MetFORMIN HCl (Tab) GLUCOPHAGE 1000 MG take 1 tablet by mouth twice a day WITH A MEAL        Warfarin Sodium (Tab) COUMADIN 5 MG Take one-half to one tablet by mouth one time daily as directed by anticoagulation clinic        .                 Medicines prescribed today were sent to:     [facility] [address] [place] [address] [address] [facility] [address]    Phone: [phone] Fax: [phone]    Open 24 Hours?: No      Medication refill instructions:       If your prescription bottle indicates you have medication refills left, it is not necessary to call your provider’s office. Please contact your pharmacy and they will refill your medication.    If your prescription bottle indicates you do not have any refills left, you may request refills at any time through one of the following ways: The online [employer] system (except Urgent Care), by calling your provider’s office, or by asking your pharmacy to contact your provider’s office with " a refill request. Medication refills are processed only during regular business hours and may not be available until the next business day. Your provider may request additional information or to have a follow-up visit with you prior to refilling your medication.   *Please Note: Medication refills are assigned a new Rx number when refilled electronically. Your pharmacy may indicate that no refills were authorized even though a new prescription for the same medication is available at the pharmacy. Please request the medicine by name with the pharmacy before contacting your provider for a refill.        Your To Do List     Future Labs/Procedures Complete By Expires    ECHOCARDIOGRAM COMP W/O CONT  As directed 4/18/2018         Science Access Code: Activation code not generated  Current Science Status: Active

## 2017-04-18 NOTE — Clinical Note
[facility] [place] [address],   2nd [address]  [place] [address]  Phone: [phone]  Fax: [phone]              Chirag Balderas  1945    Encounter Date: 4/18/2017    Thomas Zhang M.D.    Thank you for the referral. I had the pleasure of seeing Chirag Balderas today in cardiology clinic. I've attached my visit note below. If you have any questions please feel free to give me a call anytime.      Monika Catalan MD, PhD, [facility]  Cardiology and Lipidology  [facility]                                                                  PROGRESS NOTE:  Chief Complaint   Patient presents with   • Follow-Up       This patient is an established male who is here today to discuss:  Recheck CM; Unable to afford Entresto;; Refill ACEI;     Patient Active Problem List    Diagnosis Date Noted   • Presence of biventricular AICD 05/02/2016     Priority: High   • Persistent atrial fibrillation (CMS-HCC) 03/04/2016     Priority: High   • Atypical atrial flutter (CMS-HCC) 03/04/2016     Priority: High   • Anticoagulant long-term use 03/04/2016     Priority: High   • Ischemic cardiomyopathy 05/14/2013     Priority: High   • Hypertriglyceridemia 05/14/2013     Priority: High   • Coronary artery disease 06/05/2012     Priority: High   • HTN (hypertension) 06/05/2012     Priority: High   • Hyperlipidemia 06/05/2012     Priority: High   • Systolic CHF (CMS-HCC) 09/05/2014     Priority: Medium   • Sleep apnea 05/16/2013     Priority: Medium   • Type 2 diabetes mellitus without complication (CMS-HCC) 06/05/2012     Priority: Medium   • Hypothyroid 09/03/2014     Priority: Low   • Depression 02/21/2014     Priority: Low   • Chronic paroxysmal hemicrania, not intractable 07/09/2015   • Pulmonary nodule 09/03/2014   • ED (erectile dysfunction) 08/20/2013   • Prostate cancer screening 06/25/2013   • Colon cancer screening 06/25/2013        Past Medical History   Diagnosis Date   • Coronary artery disease 2002     History of remote MI/stent   • HTN (hypertension)     • Hyperlipidemia     • Myocardial infarction (CMS-HCC) 2002   • Ischemic cardiomyopathy March 2015     Echocardiogram with LVEF 30-40%. Mild-moderate septal hypertrophy. Moderately enlarged LA.  Moderate-severely enlarged RA. Mild AR. Mild MR. Mild TR, RVSP 5-10mmHg.   • Systolic CHF (CMS-HCC)     • DIABETES MELLITUS      On insulin   • Chronic anticoagulation          • Atrial fibrillation, persistent (CMS-HCC)          • Dental disorder      Dentures   • COPD (chronic obstructive pulmonary disease) (CMS-HCC)    • Supplemental oxygen dependent    • Sleep apnea      Uses CPAP   • Breath shortness      On oxygen   • Cataract      Bilateral IOL   • Hypothyroidism    • Depression      Past Surgical History   Procedure Laterality Date   • Cataract extraction with iol Left 1980         • Cataract extraction with iol Right 2000         • Cardiac cath  2002   • Other neurological surg  03/2013     Laminectomy lumbar   • Recovery  3/18/2016     Procedure: CATH LAB BIV ICD INSERT [facility] WINSLOW;  Surgeon: Recoveryonomer Surgery;  Location: SURGERY PRE-POST PROC UNIT [facility];  Service:    • Aicd implant  March 2016     [employer] Quad[employer] [id number] CRT-D implanted by Dr. Winslow.       Current Outpatient Prescriptions   Medication Sig Dispense Refill   • lisinopril (PRINIVIL) 20 MG Tab Take 1 Tab by mouth every day. 90 Tab 3   • metformin (GLUCOPHAGE) 1000 MG tablet take 1 tablet by mouth twice a day WITH A MEAL 180 Tab 3   • atorvastatin (LIPITOR) 80 MG tablet Take 1 Tab by mouth every day. 90 Tab 3   • HUMALOG KWIKPEN 200 UNIT/ML Solution Pen-injector Inject 20-30 Units as instructed every bedtime. 30 mL 3   • warfarin (COUMADIN) 5 MG Tab Take one-half to one tablet by mouth one time daily as directed by anticoagulation clinic 90 Tab 1   • TOUJEO SOLOSTAR 300 UNIT/ML Solution Pen-injector  "Inject 25 Units as instructed every bedtime. 4.5 mL 3   • levothyroxine (SYNTHROID) 100 MCG Tab Take 1 Tab by mouth Every morning on an empty stomach. 90 Tab 3   • Blood Glucose Monitoring Suppl SUPPLIES Misc Freestyle Freedome Lite test strips. Use to test blood sugars 4-5 times daily as directed. Dx: ICD-10. E11.9, Z74.9 500 Strip 3   • furosemide (LASIX) 40 MG Tab Take 1 Tab by mouth 1 time daily as needed. 30 Tab 4   • carvedilol (COREG) 3.125 MG Tab Take 1 Tab by mouth 2 times a day, with meals. 180 Tab 2   • Insulin Syringe-Needle U-100 (INSULIN SYRINGE .3CC/31GX5/16\") 31G X 5/16\" 0.3 ML Misc For insulin shots 5 times a day 200 Each 6   • glucose blood (ONE TOUCH ULTRA TEST) strip Use to test blood sugar levels 5 times daily as directed by physician. ICD-10: E11.9, Z79.4 500 Strip 5     No current facility-administered medications for this visit.     Pcn      Review of Systems:     Constitutional: Denies fevers, Denies weight changes  Eyes: Denies changes in vision, no eye pain  Ears/Nose/Throat/Mouth: Denies nasal congestion or sore throat   Cardiovascular: Denies chest pain or palpitations   Respiratory: Chjronic shortness of breath , Denies cough  Gastrointestinal/Hepatic: Denies abdominal pain, nausea, vomiting, diarrhea, constipation or GI bleeding   Genitourinary: Denies bladder dysfunction, dysuria or frequency  Musculoskeletal/Rheum: Denies  joint pain and swelling   Skin/Breast: Denies rash, denies breast lumps or discharge  Neurological: Denies headache, confusion, memory loss or focal weakness/parasthesias  Psychiatric: denies mood disorder   Endocrine: denies hx of diabetes or thyroid dysfunction  Heme/Oncology/Lymph Nodes: Denies enlarged lymph nodes, denies brusing or known bleeding disorder  Allergic/Immunologic:  hx of allergies      All other systems were reviewed and are negative (AMA/CMS criteria)      Blood pressure 140/70, pulse 70, height 1.829 m (6' 0.01\"), weight 106.142 kg (234 lb), " SpO2 93 %.  HENT: Normocephalic, Atraumatic, Oropharynx moist mucous membranes, Dentition: , Nose normal. Mallampati 4 OP    Eyes: PERRLA, EOMI, Conjunctiva normal, No discharge.    Neck:Large; Normal range of motion, No cervical tenderness, Supple, No stridor, no JVD. No thyromegaly. No carotid bruit.    Cardiovascular: Normal heart rate, Normal rhythm, nl S1, S2, no S3, butS4; No gallops; 1-2/6 SE murmurs at AoV, MV and TV, No rubs, . Extremitites with intact distal pulses, no cyanosis, clubbing but trace edema. No heaves, thrills, HJR;  Left pectoral BiV/AICD site healing w/o bleeding/redness  Peripheral pulses: carotid 2+, brachial 2+, radial 2+, ulnar 2+, femoral ?, popliteal 1+, PT 1+, DP 1+;    Lungs: Respiratory effort is normal. Diminished breath sounds, breath sounds clear to auscultation bilaterally, no rales, no rhonchi, no wheezing.    Abdomen: large; Bowel sounds normal, Soft, No tenderness, No guarding, No rebound, No masses, No hepatosplenomegaly.    Skin: Warm, Dry, No erythema, No rash, no induration or crepitus.    Neurologic: Alert & oriented x 3, Normal motor function, Normal sensory function, No focal deficits noted, cranial nerves II through XII are normal, normal gait.    Psychiatric: Affect normal, Judgment normal, Mood normal    Results for ELY SOW (MRN [id number]) as of 4/18/2017 13:46   Ref. Range 4/10/2017 00:00 4/12/2017 08:40 4/12/2017 13:31   WBC Latest Ref Range: 4.8-10.8 K/uL  9.1    RBC Latest Ref Range: 4.70-6.10 M/uL  5.21    Hemoglobin Latest Ref Range: 14.0-18.0 g/dL  15.4    Hematocrit Latest Ref Range: 42.0-52.0 %  47.1    MCV Latest Ref Range: 80.0-94.0 fL  90.4    MCH Latest Ref Range: 27.0-31.0 pg  29.6    MCHC Latest Ref Range: 33.0-37.0 g/dL  32.7 (L)    RDW Latest Ref Range: 11.5-14.5 %  13.4    Platelet Count Latest Ref Range: 130-400 K/uL  313    MPV Latest Ref Range: 7.4-10.4 fL  10.9 (H)    Sodium Latest Ref Range: 136-145 mmol/L  136    Potassium Latest  Ref Range: 3.5-5.1 mmol/L  4.7    Chloride Latest Ref Range:  mmol/L  104    Co2 Latest Ref Range: 21-32 mmol/L  24    Anion Gap Latest Ref Range: 10-18 mmol/L  13    Glucose Latest Ref Range: 74-99 mg/dL  238 (H)    Bun Latest Ref Range: 7-18 mg/dL  20 (H)    Creatinine Latest Ref Range: 0.8-1.3 mg/dL  1.3    GFR If  Latest Ref Range: >60 mL/min/1.73 m 2  >60    GFR If Non  Latest Ref Range: >60 mL/min/1.73 m 2  54 (A)    Calcium Latest Ref Range: 8.5-11.0 mg/dL  8.8    AST(SGOT) Latest Ref Range: 15-37 U/L  20    ALT(SGPT) Latest Ref Range: 12-78 U/L  32    Alkaline Phosphatase Latest Ref Range:  U/L  56    Total Bilirubin Latest Ref Range: 0.2-1.0 mg/dL  0.6    Albumin Latest Ref Range: 3.4-5.0 g/dL  3.3 (L)    Total Protein Latest Ref Range: 6.4-8.2 g/dL  7.2    A-G Ratio Unknown  0.8    Glycohemoglobin Latest Ref Range: <6.0 %   8.1 (H)   Estim. Avg Glu Latest Units: mg/dL   186   Cholesterol,Tot Latest Ref Range: 120-200 mg/dL  175    Triglycerides Latest Ref Range: 0-150 mg/dL  188 (H)    HDL Latest Ref Range: 40.0-60.0 mg/dL  38.0 (L)    Non HDL Cholesterol Latest Ref Range:    137    LDL Latest Ref Range: <100 mg/dL  99    Chol-Hdl Ratio Unknown  4.61    INR Unknown 1.8     TSH Latest Ref Range: 0.36-3.74 uIU/mL  2.09      Assessment and Plan.   71 y.o. male has CM and mod to severly reduced LVEF; Recheck as per order;     1. Coronary artery disease due to lipid rich plaque    - lisinopril (PRINIVIL) 20 MG Tab; Take 1 Tab by mouth every day.  Dispense: 90 Tab; Refill: 3    2. Presence of biventricular AICD  No firing    3. Persistent atrial fibrillation (CMS-HCC)  stable    4. Anticoagulant long-term use  Warfarin, no bleeding    5. Type 2 diabetes mellitus without complication, unspecified long term insulin use status (CMS-[facility])  PCP  - lisinopril (PRINIVIL) 20 MG Tab; Take 1 Tab by mouth every day.  Dispense: 90 Tab; Refill: 3    6. Edema, unspecified  type  gone    7. Cardiomyopathy, ischemic  Echo recheck  - lisinopril (PRINIVIL) 20 MG Tab; Take 1 Tab by mouth every day.  Dispense: 90 Tab; Refill: 3      1. Coronary artery disease due to lipid rich plaque  lisinopril (PRINIVIL) 20 MG Tab   2. Presence of biventricular AICD     3. Persistent atrial fibrillation (CMS-[facility])     4. Anticoagulant long-term use     5. Type 2 diabetes mellitus without complication, unspecified long term insulin use status (CMS-[facility])  lisinopril (PRINIVIL) 20 MG Tab   6. Edema, unspecified type     7. Cardiomyopathy, ischemic  lisinopril (PRINIVIL) 20 MG Tab             Thomas Zhang M.D.  [address]  [facility] [address]  VIA In Basket

## 2017-04-20 ENCOUNTER — TELEPHONE (OUTPATIENT)
Dept: CARDIOLOGY | Facility: MEDICAL CENTER | Age: 72
End: 2017-04-20

## 2017-04-20 NOTE — TELEPHONE ENCOUNTER
Spoke with patient's wife regarding Entresto patient assistance and she stated that they feel with their yearly income they will not qualify for assistance and Chirag will stop taking the Entresto and stay on the lisinopril  Per patient: at this moment we will not proceed with patient assistance application

## 2017-04-26 ENCOUNTER — ANTICOAGULATION MONITORING (OUTPATIENT)
Dept: VASCULAR LAB | Facility: MEDICAL CENTER | Age: 72
End: 2017-04-26

## 2017-04-26 LAB — INR PPP: 2.2 (ref 2–3.5)

## 2017-04-26 NOTE — PROGRESS NOTES
Anticoagulation Summary as of 4/26/2017     INR goal 2.0-3.0    Selected INR 2.2 (4/26/2017)    Maintenance plan 5 mg (5 mg x 1) on Sun, Tue, Thu; 2.5 mg (5 mg x 0.5) all other days    Weekly total 25 mg    No change documented Rachana Chaparro, Med Ass't    Plan last modified Rafat Marcial PHARMD (3/21/2017)    Next INR check 5/10/2017    Target end date Indefinite      Anticoagulation Episode Summary     INR check location Outside Lab    Preferred lab     Send INR reminders to     Comments       Anticoagulation Care Providers     Provider Role Specialty Phone number    Zain Winslow M.D. Referring Cardiac Electrophysiology [phone]    Cherelle Nixon PHARMD Responsible          Anticoagulation Patient Findings   Negatives Missed Doses, Extra Doses, Medication Changes, Antibiotic Use, Diet Changes, Dental/Other Procedures, Hospitalization, Bleeding Gums, Nose Bleeds, Blood in Urine, Blood in Stool, Any Bruising, Other Complaints        Spoke with patient to report a therapeutic INR.  Pt instructed to continue with current warfarin dosing regimen. Pt denies any s/s of bleeding, bruising, clotting or any changes to diet or medication.  Will follow up in 2 weeks.    Rachana Chaparro, Med Ass't      I have reviewed and agree with the plan above on 04/27/2017    Cherelle Nixon PHARMD

## 2017-05-08 ENCOUNTER — NON-PROVIDER VISIT (OUTPATIENT)
Dept: CARDIOLOGY | Facility: PHYSICIAN GROUP | Age: 72
End: 2017-05-08
Payer: MEDICARE

## 2017-05-08 VITALS
WEIGHT: 234 LBS | HEART RATE: 70 BPM | DIASTOLIC BLOOD PRESSURE: 62 MMHG | OXYGEN SATURATION: 92 % | SYSTOLIC BLOOD PRESSURE: 122 MMHG | HEIGHT: 72 IN | BODY MASS INDEX: 31.69 KG/M2

## 2017-05-08 DIAGNOSIS — I25.83 CORONARY ARTERY DISEASE DUE TO LIPID RICH PLAQUE: ICD-10-CM

## 2017-05-08 DIAGNOSIS — I25.10 CORONARY ARTERY DISEASE DUE TO LIPID RICH PLAQUE: ICD-10-CM

## 2017-05-08 DIAGNOSIS — I48.19 PERSISTENT ATRIAL FIBRILLATION (HCC): ICD-10-CM

## 2017-05-08 DIAGNOSIS — I25.5 ISCHEMIC CARDIOMYOPATHY: ICD-10-CM

## 2017-05-08 DIAGNOSIS — Z95.810 PRESENCE OF BIVENTRICULAR AICD: ICD-10-CM

## 2017-05-08 DIAGNOSIS — Z79.01 ANTICOAGULANT LONG-TERM USE: ICD-10-CM

## 2017-05-08 DIAGNOSIS — I48.4 ATYPICAL ATRIAL FLUTTER (HCC): ICD-10-CM

## 2017-05-08 PROCEDURE — 93289 INTERROG DEVICE EVAL HEART: CPT | Performed by: NURSE PRACTITIONER

## 2017-05-08 NOTE — MR AVS SNAPSHOT
"        Chirag Balderas   2017 11:20 AM   Non-Provider Visit   MRN: [id number]    [facility] Phone:  [phone]    Description:  Male : 1945   Provider:  GUILLERMINA PeñaPLUIS MIGUEL           Reason for Visit     AICD Check/Dysfunction [facility]      Allergies as of 2017     Allergen Noted Reactions    Pcn [Penicillins] 2012       swelling      You were diagnosed with     Presence of biventricular AICD   [848686]       Coronary artery disease due to lipid rich plaque   [6479558]       Ischemic cardiomyopathy   [479845]       Persistent atrial fibrillation (CMS-HCC)   [625354]       Atypical atrial flutter (CMS-HCC)   [034166]       Anticoagulant long-term use   [672805]         Vital Signs     Blood Pressure Pulse Height Weight Body Mass Index Oxygen Saturation    122/62 mmHg 70 1.829 m (6' 0.01\") 106.142 kg (234 lb) 31.73 kg/m2 92%    Smoking Status                   Former Smoker           Basic Information     Date Of Birth Sex Race Ethnicity Preferred Language    1945 Male White Non- English      Your appointments     May 09, 2017 11:00 AM   Echo with ECHO CV   ECHO [facility] (--)    [address]  [facility] [address] [phone]            May 24, 2017 10:20 AM   Diabetes Care Visit with Indira Herrera M.D., Ashly Mgcuire R.N.   [facility]    [address]  [facility] [address] [phone]           You will be receiving a confirmation call a few days before your appointment from our automated call confirmation system.            Oct 26, 2017 12:20 PM   FOLLOW UP with Monika Catalan MD,[facility]    [address]  [facility] [address] [phone]            Nov 15, 2017  1:00 PM   PACER CHECK ONLY with GUILLERMINA PeñaP.KATHERINE   [facility]    [address]  [facility] [address]   [phone]              Problem List              ICD-10-CM Priority Class Noted - Resolved    Coronary artery disease I25.10 High  6/5/2012 - Present    HTN (hypertension) I10 High  6/5/2012 - Present    Type 2 diabetes mellitus without complication (CMS-HCC) E11.9 Medium  6/5/2012 - Present    Hyperlipidemia E78.5 High  6/5/2012 - Present    Ischemic cardiomyopathy I25.5 High  5/14/2013 - Present    Hypertriglyceridemia E78.1 High  5/14/2013 - Present    Sleep apnea G47.30 Medium  5/16/2013 - Present    Prostate cancer screening Z12.5   6/25/2013 - Present    Colon cancer screening Z12.11   6/25/2013 - Present    ED (erectile dysfunction) N52.9   8/20/2013 - Present    Depression F32.9 Low  2/21/2014 - Present    Hypothyroid E03.9 Low  9/3/2014 - Present    Pulmonary nodule R91.1   9/3/2014 - Present    Systolic CHF (CMS-HCC) I50.20 Medium  9/5/2014 - Present    Chronic paroxysmal hemicrania, not intractable G44.049   7/9/2015 - Present    Persistent atrial fibrillation (CMS-HCC) I48.1 High  3/4/2016 - Present    Atypical atrial flutter (CMS-HCC) I48.4 High  3/4/2016 - Present    Anticoagulant long-term use Z79.01 High  3/4/2016 - Present    Presence of biventricular AICD Z95.810 High  5/2/2016 - Present      Health Maintenance        Date Due Completion Dates    IMM DTaP/Tdap/Td Vaccine (1 - Tdap) 9/8/1963 ---    IMM ZOSTER VACCINE 9/8/2004 ---    IMM PNEUMOCOCCAL 65+ (ADULT) LOW/MEDIUM RISK SERIES (2 of 2 - PCV13) 10/8/2013 10/8/2012    DIABETES MONOFILAMENT / LE EXAM 10/5/2017 10/5/2016, 5/7/2015 (N/S), 3/11/2014 (N/S)    Override on 5/7/2015: (N/S)    Override on 3/11/2014: (N/S)    URINE ACR / MICROALBUMIN 10/11/2017 10/11/2016, 11/10/2015, 3/5/2014, 6/20/2013, 6/19/2012    A1C SCREENING 10/12/2017 4/12/2017, 2/2/2017, 9/29/2016, 5/31/2016, 4/19/2016, 11/16/2015, 11/16/2015 (N/S), 8/10/2015, 5/7/2015, 5/7/2015 (N/S), 12/3/2014, 12/3/2014 (N/S), 9/3/2014, 6/11/2014,  6/11/2014 (N/S), 3/11/2014, 3/11/2014 (N/S), 12/3/2013, 12/3/2013 (N/S), 6/25/2013, 3/26/2013, 12/19/2012, 10/8/2012, 7/3/2012    Override on 11/16/2015: (N/S)    Override on 5/7/2015: (N/S)    Override on 12/3/2014: (N/S)    Override on 6/11/2014: (N/S)    Override on 3/11/2014: (N/S)    Override on 12/3/2013: (N/S)    RETINAL SCREENING 2/20/2018 2/20/2017 (N/S), 2/1/2016, 2/1/2016, 8/26/2015 (Done), 11/1/2014 (N/S), 12/19/2013 (N/S)    Override on 2/20/2017: (N/S)    Override on 8/26/2015: Done (Dr. Baez)    Override on 11/1/2014: (N/S)    Override on 12/19/2013: (N/S)    FASTING LIPID PROFILE 4/12/2018 4/12/2017, 9/29/2016, 11/10/2015, 1/5/2015, 10/9/2014, 3/5/2014, 6/20/2013, 6/19/2012    SERUM CREATININE 4/12/2018 4/12/2017, 9/29/2016, 3/19/2016, 3/16/2016, 11/10/2015, 6/29/2015, 4/28/2015, 1/30/2015, 1/5/2015, 10/9/2014, 8/26/2014, 8/25/2014, 8/24/2014, 8/23/2014, 8/22/2014, 3/5/2014, 6/20/2013, 5/17/2013, 6/19/2012    COLONOSCOPY 6/11/2024 6/11/2014 (Declined)    Override on 6/11/2014: Patient Declined            Current Immunizations     Influenza TIV (IM) 10/15/2014, 10/3/2013, 10/8/2012    Influenza Vaccine Adult HD 10/10/2016 10:07 AM    Influenza Vaccine Quad Inj (Preserved) 11/16/2015 10:26 AM    Pneumococcal polysaccharide vaccine (PPSV-23) 10/8/2012      Below and/or attached are the medications your provider expects you to take. Review all of your home medications and newly ordered medications with your provider and/or pharmacist. Follow medication instructions as directed by your provider and/or pharmacist. Please keep your medication list with you and share with your provider. Update the information when medications are discontinued, doses are changed, or new medications (including over-the-counter products) are added; and carry medication information at all times in the event of emergency situations     Allergies:  PCN - (reactions not documented)               Medications  Valid as of: May 08, 2017  "- 11:36 AM    Generic Name Brand Name Tablet Size Instructions for use    Atorvastatin Calcium (Tab) LIPITOR 80 MG Take 1 Tab by mouth every day.        Blood Glucose Monitoring Suppl (Misc) Blood Glucose Monitoring Suppl SUPPLIES Freestyle Freedome Lite test strips. Use to test blood sugars 4-5 times daily as directed. Dx: ICD-10. E11.9, Z74.9        Carvedilol (Tab) COREG 3.125 MG Take 1 Tab by mouth 2 times a day, with meals.        Furosemide (Tab) LASIX 40 MG Take 1 Tab by mouth 1 time daily as needed.        Glucose Blood (Strip) glucose blood  Use to test blood sugar levels 5 times daily as directed by physician. ICD-10: E11.9, Z79.4        Insulin Glargine (Solution Pen-injector) TOUJEO SOLOSTAR 300 UNIT/ML Inject 25 Units as instructed every bedtime.        Insulin Lispro (Solution Pen-injector) HUMALOG KWIKPEN 200 UNIT/ML Inject 20-30 Units as instructed every bedtime.        Insulin Syringe-Needle U-100 (Misc) INSULIN SYRINGE .3CC/31GX5/16\" 31G X 5/16\" 0.3 ML For insulin shots 5 times a day        Levothyroxine Sodium (Tab) SYNTHROID 100 MCG Take 1 Tab by mouth Every morning on an empty stomach.        Lisinopril (Tab) PRINIVIL 20 MG Take 1 Tab by mouth every day.        MetFORMIN HCl (Tab) GLUCOPHAGE 1000 MG take 1 tablet by mouth twice a day WITH A MEAL        Warfarin Sodium (Tab) COUMADIN 5 MG Take one-half to one tablet by mouth one time daily as directed by anticoagulation clinic        .                 Medicines prescribed today were sent to:     [facility] [address] [place] [address] - [id number] [facility] [address]    Phone: [phone] Fax: [phone]    Open 24 Hours?: No      Medication refill instructions:       If your prescription bottle indicates you have medication refills left, it is not necessary to call your provider’s office. Please contact your pharmacy and they will refill your medication.    If your prescription bottle indicates you do not have any " refills left, you may request refills at any time through one of the following ways: The online [employer] system (except Urgent Care), by calling your provider’s office, or by asking your pharmacy to contact your provider’s office with a refill request. Medication refills are processed only during regular business hours and may not be available until the next business day. Your provider may request additional information or to have a follow-up visit with you prior to refilling your medication.   *Please Note: Medication refills are assigned a new Rx number when refilled electronically. Your pharmacy may indicate that no refills were authorized even though a new prescription for the same medication is available at the pharmacy. Please request the medicine by name with the pharmacy before contacting your provider for a refill.           [employer] Access Code: Activation code not generated  Current [employer] Status: Active

## 2017-05-10 DIAGNOSIS — I25.83 CORONARY ARTERY DISEASE DUE TO LIPID RICH PLAQUE: ICD-10-CM

## 2017-05-10 DIAGNOSIS — I25.10 CORONARY ARTERY DISEASE DUE TO LIPID RICH PLAQUE: ICD-10-CM

## 2017-05-10 DIAGNOSIS — I25.5 CARDIOMYOPATHY, ISCHEMIC: ICD-10-CM

## 2017-05-10 LAB — INR PPP: 3.1 (ref 2–3.5)

## 2017-05-10 PROCEDURE — 93306 TTE W/DOPPLER COMPLETE: CPT | Performed by: INTERNAL MEDICINE

## 2017-05-11 ENCOUNTER — ANTICOAGULATION MONITORING (OUTPATIENT)
Dept: VASCULAR LAB | Facility: MEDICAL CENTER | Age: 72
End: 2017-05-11

## 2017-05-11 NOTE — PROGRESS NOTES
Anticoagulation Summary as of 5/11/2017     INR goal 2.0-3.0    Selected INR 3.1! (5/10/2017)    Maintenance plan 5 mg (5 mg x 1) on Sun, Tue, Thu; 2.5 mg (5 mg x 0.5) all other days    Weekly total 25 mg    Plan last modified Rafat Marcial PHARMD (3/21/2017)    Next INR check 5/24/2017    Target end date Indefinite      Anticoagulation Episode Summary     INR check location Outside Lab    Preferred lab     Send INR reminders to     Comments       Anticoagulation Care Providers     Provider Role Specialty Phone number    Zain Winslow M.D. Referring Cardiac Electrophysiology [phone]    Cherelle Nixon, ROSANAD Responsible          Spoke with Agustín to report a supra therapeutic INR of 3.1.  Will reduce tonight's dose to 2.5mg then resume current dosing regimen. .dne  Follow up in 2 weeks.    ROSANA LimaD

## 2017-05-16 ENCOUNTER — TELEPHONE (OUTPATIENT)
Dept: CARDIOLOGY | Facility: MEDICAL CENTER | Age: 72
End: 2017-05-16

## 2017-05-16 NOTE — TELEPHONE ENCOUNTER
----- Message from Monika Catalan MD,FACC sent at 5/16/2017  6:53 AM PDT -----  Yes appt sooner , better LVEF but other info to discuss; Thx  ----- Message -----     From: Paulino Rivas     Sent: 5/15/2017   5:18 PM       To: Paulino Rivas, Monika Catalan MD,[facility]    Doesn't f/u with you until October - anything in particular to tell him about this - has it changed since last one - do you need to see him sooner?  Please advise

## 2017-05-16 NOTE — TELEPHONE ENCOUNTER
Spoke with patient and informed him that Dr. Catalan would like him to come in soon for f/u to discuss echo results.  He agreed and appt made

## 2017-05-24 ENCOUNTER — OFFICE VISIT (OUTPATIENT)
Dept: ENDOCRINOLOGY | Facility: MEDICAL CENTER | Age: 72
End: 2017-05-24
Payer: MEDICARE

## 2017-05-24 VITALS
HEIGHT: 72 IN | BODY MASS INDEX: 31.83 KG/M2 | SYSTOLIC BLOOD PRESSURE: 102 MMHG | OXYGEN SATURATION: 89 % | DIASTOLIC BLOOD PRESSURE: 60 MMHG | HEART RATE: 80 BPM | WEIGHT: 235 LBS

## 2017-05-24 DIAGNOSIS — I10 ESSENTIAL HYPERTENSION: ICD-10-CM

## 2017-05-24 DIAGNOSIS — E03.9 ACQUIRED HYPOTHYROIDISM: ICD-10-CM

## 2017-05-24 DIAGNOSIS — Z79.4 TYPE 2 DIABETES MELLITUS WITH HYPERGLYCEMIA, WITH LONG-TERM CURRENT USE OF INSULIN (HCC): ICD-10-CM

## 2017-05-24 DIAGNOSIS — E78.2 MIXED HYPERLIPIDEMIA: ICD-10-CM

## 2017-05-24 DIAGNOSIS — E11.65 TYPE 2 DIABETES MELLITUS WITH HYPERGLYCEMIA, WITH LONG-TERM CURRENT USE OF INSULIN (HCC): ICD-10-CM

## 2017-05-24 PROCEDURE — 3045F PR MOST RECENT HEMOGLOBIN A1C LEVEL 7.0-9.0%: CPT | Performed by: INTERNAL MEDICINE

## 2017-05-24 PROCEDURE — 1036F TOBACCO NON-USER: CPT | Performed by: INTERNAL MEDICINE

## 2017-05-24 PROCEDURE — 1101F PT FALLS ASSESS-DOCD LE1/YR: CPT | Mod: 8P | Performed by: INTERNAL MEDICINE

## 2017-05-24 PROCEDURE — G8417 CALC BMI ABV UP PARAM F/U: HCPCS | Performed by: INTERNAL MEDICINE

## 2017-05-24 PROCEDURE — 99214 OFFICE O/P EST MOD 30 MIN: CPT | Mod: 25 | Performed by: INTERNAL MEDICINE

## 2017-05-24 PROCEDURE — G8598 ASA/ANTIPLAT THER USED: HCPCS | Performed by: INTERNAL MEDICINE

## 2017-05-24 PROCEDURE — 4040F PNEUMOC VAC/ADMIN/RCVD: CPT | Performed by: INTERNAL MEDICINE

## 2017-05-24 NOTE — PROGRESS NOTES
"Patient with existing type diabetes:  Type 2 diabetes here for follow up.      Patient's health status since last visit: no change.   Issues with diabetes since last visit in \"donut hole\" stopped Toujeo and Humalog as he couldn't afford it and went back to NPH and Regular.   Current Diabetes Medications: NPH 40 units at hs and Regular insulin 25 units with meals.     HbA1c: @hba1c@  Lab Results   Component Value Date/Time    GLYCOHEMOGLOBIN 8.1* 04/12/2017 01:31 PM        FSBS  Testing: testing 4 times per day    Hypoglycemia: has had a couple of low blood sugars within the past month.  Both occurred between lunch and dinner.   Exercise: no    Retinal Exam:current.     Daily Foot Exam: yes.     Flu vaccine: current.  Pneumonia vaccine current.       "

## 2017-05-24 NOTE — MR AVS SNAPSHOT
Chirag Balderas   2017 10:20 AM   Office Visit   MRN: [id number]    Department:  Endocrinology Med [facility]   Dept Phone:  [phone]    Description:  Male : 1945   Provider:  Ashly Mcguire R.N.; Indira Herrera M.D.           Reason for Visit     Diabetes Mellitus follow up      Allergies as of 2017     Allergen Noted Reactions    Pcn [Penicillins] 2012       swelling      You were diagnosed with     Type 2 diabetes mellitus with hyperglycemia, with long-term current use of insulin (CMS-[facility])   [3702991]       Acquired hypothyroidism   [5017085]       Essential hypertension   [4356918]       Mixed hyperlipidemia   [272.2.ICD-9-CM]         Vital Signs     Blood Pressure Pulse Height Weight Body Mass Index Oxygen Saturation    102/60 mmHg 80 1.829 m (6') 106.595 kg (235 lb) 31.86 kg/m2 89%    Smoking Status                   Former Smoker           Basic Information     Date Of Birth Sex Race Ethnicity Preferred Language    1945 Male White Non- English      Your appointments     2017 11:20 AM   FOLLOW UP with Monika Catalan MD,[facility] ([facility]    [address] [place] [id number] [phone]            Sep 08, 2017 10:00 AM   Established Patient with Indira Herrera M.D.   [facility]    [address], [address]  [facility] [address] [phone]           You will be receiving a confirmation call a few days before your appointment from our automated call confirmation system.            Oct 26, 2017 12:20 PM   FOLLOW UP with Monika Catalan MD,[facility] ([facility])    [address] [address] [phone]            Nov 15, 2017  1:00 PM   PACER CHECK ONLY with SAKINA Peña   [facility] ([facility]    [address]  [facility] [address]   [phone]              Problem List              ICD-10-CM Priority Class Noted - Resolved    Coronary artery disease I25.10 High  6/5/2012 - Present    HTN (hypertension) I10 High  6/5/2012 - Present    Type 2 diabetes mellitus without complication (CMS-HCC) E11.9 Medium  6/5/2012 - Present    Hyperlipidemia E78.5 High  6/5/2012 - Present    Ischemic cardiomyopathy I25.5 High  5/14/2013 - Present    Hypertriglyceridemia E78.1 High  5/14/2013 - Present    Sleep apnea G47.30 Medium  5/16/2013 - Present    Prostate cancer screening Z12.5   6/25/2013 - Present    Colon cancer screening Z12.11   6/25/2013 - Present    ED (erectile dysfunction) N52.9   8/20/2013 - Present    Depression F32.9 Low  2/21/2014 - Present    Hypothyroid E03.9 Low  9/3/2014 - Present    Pulmonary nodule R91.1   9/3/2014 - Present    Systolic CHF (CMS-HCC) I50.20 Medium  9/5/2014 - Present    Chronic paroxysmal hemicrania, not intractable G44.049   7/9/2015 - Present    Persistent atrial fibrillation (CMS-HCC) I48.1 High  3/4/2016 - Present    Atypical atrial flutter (CMS-HCC) I48.4 High  3/4/2016 - Present    Anticoagulant long-term use Z79.01 High  3/4/2016 - Present    Presence of biventricular AICD Z95.810 High  5/2/2016 - Present      Health Maintenance        Date Due Completion Dates    IMM DTaP/Tdap/Td Vaccine (1 - Tdap) 9/8/1963 ---    IMM ZOSTER VACCINE 9/8/2004 ---    IMM PNEUMOCOCCAL 65+ (ADULT) LOW/MEDIUM RISK SERIES (2 of 2 - PCV13) 10/8/2013 10/8/2012    DIABETES MONOFILAMENT / LE EXAM 10/5/2017 10/5/2016, 5/7/2015 (N/S), 3/11/2014 (N/S)    Override on 5/7/2015: (N/S)    Override on 3/11/2014: (N/S)    URINE ACR / MICROALBUMIN 10/11/2017 10/11/2016, 11/10/2015, 3/5/2014, 6/20/2013, 6/19/2012    A1C SCREENING 10/12/2017 4/12/2017, 2/2/2017, 9/29/2016, 5/31/2016, 4/19/2016, 11/16/2015, 11/16/2015 (N/S), 8/10/2015, 5/7/2015, 5/7/2015 (N/S), 12/3/2014, 12/3/2014 (N/S), 9/3/2014,  6/11/2014, 6/11/2014 (N/S), 3/11/2014, 3/11/2014 (N/S), 12/3/2013, 12/3/2013 (N/S), 6/25/2013, 3/26/2013, 12/19/2012, 10/8/2012, 7/3/2012    Override on 11/16/2015: (N/S)    Override on 5/7/2015: (N/S)    Override on 12/3/2014: (N/S)    Override on 6/11/2014: (N/S)    Override on 3/11/2014: (N/S)    Override on 12/3/2013: (N/S)    RETINAL SCREENING 2/20/2018 2/20/2017 (N/S), 2/1/2016, 2/1/2016, 8/26/2015 (Done), 11/1/2014 (N/S), 12/19/2013 (N/S)    Override on 2/20/2017: (N/S)    Override on 8/26/2015: Done (Dr. Baez)    Override on 11/1/2014: (N/S)    Override on 12/19/2013: (N/S)    FASTING LIPID PROFILE 4/12/2018 4/12/2017, 9/29/2016, 11/10/2015, 1/5/2015, 10/9/2014, 3/5/2014, 6/20/2013, 6/19/2012    SERUM CREATININE 4/12/2018 4/12/2017, 9/29/2016, 3/19/2016, 3/16/2016, 11/10/2015, 6/29/2015, 4/28/2015, 1/30/2015, 1/5/2015, 10/9/2014, 8/26/2014, 8/25/2014, 8/24/2014, 8/23/2014, 8/22/2014, 3/5/2014, 6/20/2013, 5/17/2013, 6/19/2012    COLONOSCOPY 6/11/2024 6/11/2014 (Declined)    Override on 6/11/2014: Patient Declined            Current Immunizations     Influenza TIV (IM) 10/15/2014, 10/3/2013, 10/8/2012    Influenza Vaccine Adult HD 10/10/2016 10:07 AM    Influenza Vaccine Quad Inj (Preserved) 11/16/2015 10:26 AM    Pneumococcal polysaccharide vaccine (PPSV-23) 10/8/2012      Below and/or attached are the medications your provider expects you to take. Review all of your home medications and newly ordered medications with your provider and/or pharmacist. Follow medication instructions as directed by your provider and/or pharmacist. Please keep your medication list with you and share with your provider. Update the information when medications are discontinued, doses are changed, or new medications (including over-the-counter products) are added; and carry medication information at all times in the event of emergency situations     Allergies:  PCN - (reactions not documented)               Medications  Valid as of:  "May 24, 2017 - 10:47 AM    Generic Name Brand Name Tablet Size Instructions for use    Atorvastatin Calcium (Tab) LIPITOR 80 MG Take 1 Tab by mouth every day.        Blood Glucose Monitoring Suppl (Misc) Blood Glucose Monitoring Suppl SUPPLIES Freestyle Freedome Lite test strips. Use to test blood sugars 4-5 times daily as directed. Dx: ICD-10. E11.9, Z74.9        Carvedilol (Tab) COREG 3.125 MG Take 1 Tab by mouth 2 times a day, with meals.        Furosemide (Tab) LASIX 40 MG Take 1 Tab by mouth 1 time daily as needed.        Glucose Blood (Strip) glucose blood  Use to test blood sugar levels 5 times daily as directed by physician. ICD-10: E11.9, Z79.4        Insulin NPH Human (Isophane) (Suspension) HUMULIN,NOVOLIN 100 UNIT/ML Inject 40 Units as instructed every bedtime.        insulin regular human (HUMULIN/NOVOLIN R) HUMULIN/NOVOLIN R  Inject 25 Units as instructed 3 times a day before meals.        Insulin Syringe-Needle U-100 (Misc) INSULIN SYRINGE .3CC/31GX5/16\" 31G X 5/16\" 0.3 ML For insulin shots 5 times a day        Levothyroxine Sodium (Tab) SYNTHROID 100 MCG Take 1 Tab by mouth Every morning on an empty stomach.        Lisinopril (Tab) PRINIVIL 20 MG Take 1 Tab by mouth every day.        MetFORMIN HCl (Tab) GLUCOPHAGE 1000 MG take 1 tablet by mouth twice a day WITH A MEAL        Warfarin Sodium (Tab) COUMADIN 5 MG Take one-half to one tablet by mouth one time daily as directed by anticoagulation clinic        .                 Medicines prescribed today were sent to:     [facility] [address] [place] [address] - [address] [id number] [facility] [address]    Phone: [phone] Fax: [phone]    Open 24 Hours?: No      Medication refill instructions:       If your prescription bottle indicates you have medication refills left, it is not necessary to call your provider’s office. Please contact your pharmacy and they will refill your medication.    If your prescription bottle indicates you " do not have any refills left, you may request refills at any time through one of the following ways: The online [employer] system (except Urgent Care), by calling your provider’s office, or by asking your pharmacy to contact your provider’s office with a refill request. Medication refills are processed only during regular business hours and may not be available until the next business day. Your provider may request additional information or to have a follow-up visit with you prior to refilling your medication.   *Please Note: Medication refills are assigned a new Rx number when refilled electronically. Your pharmacy may indicate that no refills were authorized even though a new prescription for the same medication is available at the pharmacy. Please request the medicine by name with the pharmacy before contacting your provider for a refill.           [employer] Access Code: Activation code not generated  Current [employer] Status: Active

## 2017-05-24 NOTE — PROGRESS NOTES
Endocrinology Clinic Progress Note  PCP: Thomas Zhang M.D.    CC: Type 2 diabetes    HPI:  Chirag Balderas is a 71 y.o. old patient who comes in today for routine follow up.     Type 2 diabetes: He is currently on NPH 40 units at bedtime and regular insulin 25 units with each meal. Also on metformin 1000 g twice a day. Reports compliance with medications. He checks blood sugars 4 times a day. Fasting blood sugar in the morning is close to 200. Pre-meal blood sugar readings are more in the range of 150-200. No hypoglycemia. Last eye exam was done in February 2017.    Hypertension: Blood pressure is well controlled. He reports compliance with medications. He is on ARB.    Hyperlipidemia: LDL 99. He is on Lipitor, tolerating well. He has history of coronary artery disease.    ROS:  Constitutional: No unintentional weight loss  Respiratory: Positive for dyspnea on exertion    Past Medical History:  Patient Active Problem List    Diagnosis Date Noted   • Presence of biventricular AICD 05/02/2016     Priority: High   • Persistent atrial fibrillation (CMS-HCC) 03/04/2016     Priority: High   • Atypical atrial flutter (CMS-HCC) 03/04/2016     Priority: High   • Anticoagulant long-term use 03/04/2016     Priority: High   • Ischemic cardiomyopathy 05/14/2013     Priority: High   • Hypertriglyceridemia 05/14/2013     Priority: High   • Coronary artery disease 06/05/2012     Priority: High   • HTN (hypertension) 06/05/2012     Priority: High   • Hyperlipidemia 06/05/2012     Priority: High   • Systolic CHF (CMS-HCC) 09/05/2014     Priority: Medium   • Sleep apnea 05/16/2013     Priority: Medium   • Type 2 diabetes mellitus without complication (CMS-HCC) 06/05/2012     Priority: Medium   • Hypothyroid 09/03/2014     Priority: Low   • Depression 02/21/2014     Priority: Low   • Chronic paroxysmal hemicrania, not intractable 07/09/2015   • Pulmonary nodule 09/03/2014   • ED (erectile dysfunction) 08/20/2013   • Prostate  "cancer screening 06/25/2013   • Colon cancer screening 06/25/2013       Medications:    Current outpatient prescriptions:   •  insulin NPH (HUMULIN,NOVOLIN) 100 UNIT/ML Suspension, Inject 40 Units as instructed every bedtime., Disp: , Rfl:   •  insulin regular human (HUMULIN/NOVOLIN R), Inject 25 Units as instructed 3 times a day before meals., Disp: , Rfl:   •  lisinopril (PRINIVIL) 20 MG Tab, Take 1 Tab by mouth every day., Disp: 90 Tab, Rfl: 3  •  metformin (GLUCOPHAGE) 1000 MG tablet, take 1 tablet by mouth twice a day WITH A MEAL, Disp: 180 Tab, Rfl: 3  •  atorvastatin (LIPITOR) 80 MG tablet, Take 1 Tab by mouth every day., Disp: 90 Tab, Rfl: 3  •  warfarin (COUMADIN) 5 MG Tab, Take one-half to one tablet by mouth one time daily as directed by anticoagulation clinic, Disp: 90 Tab, Rfl: 1  •  levothyroxine (SYNTHROID) 100 MCG Tab, Take 1 Tab by mouth Every morning on an empty stomach., Disp: 90 Tab, Rfl: 3  •  furosemide (LASIX) 40 MG Tab, Take 1 Tab by mouth 1 time daily as needed., Disp: 30 Tab, Rfl: 4  •  carvedilol (COREG) 3.125 MG Tab, Take 1 Tab by mouth 2 times a day, with meals., Disp: 180 Tab, Rfl: 2  •  glucose blood (ONE TOUCH ULTRA TEST) strip, Use to test blood sugar levels 5 times daily as directed by physician. ICD-10: E11.9, Z79.4, Disp: 500 Strip, Rfl: 5  •  Blood Glucose Monitoring Suppl SUPPLIES Misc, Freestyle Freedome Lite test strips. Use to test blood sugars 4-5 times daily as directed. Dx: ICD-10. E11.9, Z74.9, Disp: 500 Strip, Rfl: 3  •  Insulin Syringe-Needle U-100 (INSULIN SYRINGE .3CC/31GX5/16\") 31G X 5/16\" 0.3 ML Misc, For insulin shots 5 times a day, Disp: 200 Each, Rfl: 6    Labs:  Results for ELY SOW (MRN [id number]) as of 5/24/2017 10:47   Ref. Range 10/11/2016 10:00 2/2/2017 11:23 4/12/2017 08:40 4/12/2017 13:31   Sodium Latest Ref Range: 136-145 mmol/L   136    Potassium Latest Ref Range: 3.5-5.1 mmol/L   4.7    Chloride Latest Ref Range:  mmol/L   104    Co2 " Latest Ref Range: 21-32 mmol/L   24    Anion Gap Latest Ref Range: 10-18 mmol/L   13    Glucose Latest Ref Range: 74-99 mg/dL   238 (H)    Bun Latest Ref Range: 7-18 mg/dL   20 (H)    Creatinine Latest Ref Range: 0.8-1.3 mg/dL   1.3    GFR If  Latest Ref Range: >60 mL/min/1.73 m 2   >60    GFR If Non  Latest Ref Range: >60 mL/min/1.73 m 2   54 (A)    Calcium Latest Ref Range: 8.5-11.0 mg/dL   8.8    AST(SGOT) Latest Ref Range: 15-37 U/L   20    ALT(SGPT) Latest Ref Range: 12-78 U/L   32    Alkaline Phosphatase Latest Ref Range:  U/L   56    Total Bilirubin Latest Ref Range: 0.2-1.0 mg/dL   0.6    Albumin Latest Ref Range: 3.4-5.0 g/dL   3.3 (L)    Total Protein Latest Ref Range: 6.4-8.2 g/dL   7.2    A-G Ratio Unknown   0.8    Glycohemoglobin Latest Ref Range: <6.0 %  8.8  8.1 (H)   Estim. Avg Glu Latest Units: mg/dL    186   Cholesterol,Tot Latest Ref Range: 120-200 mg/dL   175    Triglycerides Latest Ref Range: 0-150 mg/dL   188 (H)    HDL Latest Ref Range: 40.0-60.0 mg/dL   38.0 (L)    Non HDL Cholesterol Latest Ref Range:     137    LDL Latest Ref Range: <100 mg/dL   99    Chol-Hdl Ratio Unknown   4.61    Micro Alb Creat Ratio Latest Ref Range: 0-30 ug/mg 20      Creatinine, Urine Latest Units: mg/dL 168.74      Microalbumin, Urine Random Latest Ref Range: 0.0-30.0 mg/L 33.2 (H)      Results for TYEUN ELYNANI PETERSENALD (MRN [id number]) as of 5/24/2017 10:47   Ref. Range 10/11/2016 10:00 4/12/2017 08:40   TSH Latest Ref Range: 0.36-3.74 uIU/mL 7.09 (H) 2.09   Free T-4 Latest Ref Range: 0.77-1.61 ng/dL 0.77        Physical Examination:  Vital signs: /60 mmHg  Pulse 80  Ht 1.829 m (6')  Wt 106.595 kg (235 lb)  BMI 31.86 kg/m2  SpO2 89%  General: No apparent distress, cooperative  Eyes: No scleral icterus, no discharge   Psych: Alert and oriented, normal mood and affect    Assessment and Plan:    Type 2 diabetes mellitus with hyperglycemia, with long-term current use  of insulin (HCC)  · Hemoglobin A1c today in the clinic is 8.1%  · Goal hemoglobin A1c less than 7-7.5%  · Increased NPH to 42 units at bedtime and we discussed treat to target recommendations (also advised to check some 3 AM blood sugar readings while titrating up on the dose of NPH due to the potential for nocturnal hypoglycemia with NPH)  · Continue regular insulin 25 units with each meal  · Continue metformin 1000 mg twice a day, GFR 54  · Advised to continue checking blood sugars 4 times a day    Essential hypertension  · Blood pressure is well controlled   · Continue ARB, in addition to other antihypertensive agents     Mixed hyperlipidemia  · Continue Lipitor  · LDL 99    Acquired hypothyroidism  · Recent labs showed normal TSH  · Continue levothyroxine 100 µg daily    Return in about 4 months (around 9/24/2017).    Thank you for allowing me to participate in the care of this patient.    Indira Herrera M.D.    CC:   Thomas Zhang M.D.    This note was created using voice recognition software (Dragon). The accuracy of the dictation is limited by the abilities of the software. I have reviewed the note prior to signing, however some errors in grammar and context are still possible. If you have any questions related to this note please do not hesitate to contact our office.

## 2017-05-25 ENCOUNTER — ANTICOAGULATION MONITORING (OUTPATIENT)
Dept: VASCULAR LAB | Facility: MEDICAL CENTER | Age: 72
End: 2017-05-25

## 2017-05-25 LAB — INR PPP: 4.3 (ref 2–3.5)

## 2017-05-25 NOTE — PROGRESS NOTES
"Anticoagulation Summary as of 5/25/2017     INR goal 2.0-3.0    Selected INR 4.3! (5/25/2017)    Maintenance plan 2.5 mg (5 mg x 0.5) on Mon, Wed, Fri; 5 mg (5 mg x 1) all other days    Weekly total 27.5 mg    Plan last modified Suresh Hammonds, PHARMD (5/25/2017)    Next INR check 6/1/2017    Target end date Indefinite      Anticoagulation Episode Summary     INR check location Outside Lab    Preferred lab     Send INR reminders to     Comments       Anticoagulation Care Providers     Provider Role Specialty Phone number    Zain Winslow M.D. Referring Cardiac Electrophysiology [phone]    Cherelle Nixon, ROSANAD Responsible          Anticoagulation Patient Findings      Current Outpatient Prescriptions on File Prior to Visit   Medication Sig Dispense Refill   • insulin NPH (HUMULIN,NOVOLIN) 100 UNIT/ML Suspension Inject 40 Units as instructed every bedtime.     • insulin regular human (HUMULIN/NOVOLIN R) Inject 25 Units as instructed 3 times a day before meals.     • lisinopril (PRINIVIL) 20 MG Tab Take 1 Tab by mouth every day. 90 Tab 3   • metformin (GLUCOPHAGE) 1000 MG tablet take 1 tablet by mouth twice a day WITH A MEAL 180 Tab 3   • atorvastatin (LIPITOR) 80 MG tablet Take 1 Tab by mouth every day. 90 Tab 3   • warfarin (COUMADIN) 5 MG Tab Take one-half to one tablet by mouth one time daily as directed by anticoagulation clinic 90 Tab 1   • levothyroxine (SYNTHROID) 100 MCG Tab Take 1 Tab by mouth Every morning on an empty stomach. 90 Tab 3   • Blood Glucose Monitoring Suppl SUPPLIES [facility] Freestyle Freedome Lite test strips. Use to test blood sugars 4-5 times daily as directed. Dx: ICD-10. E11.9, Z74.9 500 Strip 3   • furosemide (LASIX) 40 MG Tab Take 1 Tab by mouth 1 time daily as needed. 30 Tab 4   • carvedilol (COREG) 3.125 MG Tab Take 1 Tab by mouth 2 times a day, with meals. 180 Tab 2   • Insulin Syringe-Needle U-100 (INSULIN SYRINGE .3CC/31GX5/16\") 31G X 5/16\" 0.3 ML Misc For insulin shots 5 times " a day 200 Each 6   • glucose blood (ONE TOUCH ULTRA TEST) strip Use to test blood sugar levels 5 times daily as directed by physician. ICD-10: E11.9, Z79.4 500 Strip 5     No current facility-administered medications on file prior to visit.       Lab Results   Component Value Date/Time    SODIUM 136 04/12/2017 08:40 AM    POTASSIUM 4.7 04/12/2017 08:40 AM    CHLORIDE 104 04/12/2017 08:40 AM    CO2 24 04/12/2017 08:40 AM    GLUCOSE 238* 04/12/2017 08:40 AM    BUN 20* 04/12/2017 08:40 AM    CREATININE 1.3 04/12/2017 08:40 AM        Spoke to patient on the phone.   INR  supra-therapeutic.   Denies signs/symptoms of bleeding and/or thrombosis.   Denies changes to diet or medications.   Follow up appointment in 1 week(s).    Hold two days then continue weekly warfarin dose as noted      Suresh Hammonds, PHARMD

## 2017-06-02 ENCOUNTER — ANTICOAGULATION MONITORING (OUTPATIENT)
Dept: VASCULAR LAB | Facility: MEDICAL CENTER | Age: 72
End: 2017-06-02

## 2017-06-02 LAB — INR PPP: 3 (ref 2–3.5)

## 2017-06-02 NOTE — PROGRESS NOTES
Anticoagulation Summary as of 6/2/2017     INR goal 2.0-3.0    Selected INR 3.0 (6/2/2017)    Maintenance plan 2.5 mg (5 mg x 0.5) on Mon, Wed, Fri; 5 mg (5 mg x 1) all other days    Weekly total 27.5 mg    Plan last modified ROSANA GodfreyD (5/25/2017)    Next INR check 6/16/2017    Target end date Indefinite      Anticoagulation Episode Summary     INR check location Outside Lab    Preferred lab     Send INR reminders to     Comments       Anticoagulation Care Providers     Provider Role Specialty Phone number    Zain Winslow M.D. Referring Cardiac Electrophysiology [phone]    ROSANA LimaD Responsible          Anticoagulation Patient Findings    Spoke with Chirag to report a therapeutic INR of 3.0. Pt is to continue with current warfarin dosing regimen.  Pt denies any unusual s/s of bleeding, bruising, clotting or any changes to diet or medications.  Follow up in 2 weeks.    Cherelle Nixon, PHARMD

## 2017-06-08 ENCOUNTER — OFFICE VISIT (OUTPATIENT)
Dept: CARDIOLOGY | Facility: CLINIC | Age: 72
End: 2017-06-08
Payer: MEDICARE

## 2017-06-08 VITALS
OXYGEN SATURATION: 90 % | WEIGHT: 241 LBS | HEIGHT: 72 IN | HEART RATE: 70 BPM | DIASTOLIC BLOOD PRESSURE: 60 MMHG | BODY MASS INDEX: 32.64 KG/M2 | SYSTOLIC BLOOD PRESSURE: 122 MMHG

## 2017-06-08 DIAGNOSIS — I48.19 PERSISTENT ATRIAL FIBRILLATION (HCC): ICD-10-CM

## 2017-06-08 DIAGNOSIS — I48.4 ATYPICAL ATRIAL FLUTTER (HCC): ICD-10-CM

## 2017-06-08 DIAGNOSIS — I25.5 CARDIOMYOPATHY, ISCHEMIC: ICD-10-CM

## 2017-06-08 DIAGNOSIS — Z79.01 ANTICOAGULANT LONG-TERM USE: ICD-10-CM

## 2017-06-08 DIAGNOSIS — I25.83 CORONARY ARTERY DISEASE DUE TO LIPID RICH PLAQUE: ICD-10-CM

## 2017-06-08 DIAGNOSIS — I77.89 ASCENDING AORTA ENLARGEMENT (HCC): ICD-10-CM

## 2017-06-08 DIAGNOSIS — E11.9 TYPE 2 DIABETES MELLITUS WITHOUT COMPLICATION, UNSPECIFIED LONG TERM INSULIN USE STATUS: ICD-10-CM

## 2017-06-08 DIAGNOSIS — R60.9 EDEMA, UNSPECIFIED TYPE: ICD-10-CM

## 2017-06-08 DIAGNOSIS — I25.10 CORONARY ARTERY DISEASE DUE TO LIPID RICH PLAQUE: ICD-10-CM

## 2017-06-08 DIAGNOSIS — Z95.810 PRESENCE OF BIVENTRICULAR AICD: ICD-10-CM

## 2017-06-08 DIAGNOSIS — I25.5 ISCHEMIC CARDIOMYOPATHY: ICD-10-CM

## 2017-06-08 PROCEDURE — 99214 OFFICE O/P EST MOD 30 MIN: CPT | Performed by: INTERNAL MEDICINE

## 2017-06-08 NOTE — PROGRESS NOTES
Chief Complaint   Patient presents with   • Follow-Up     discuss echo results         This patient is an established male who is here today to discuss:  C/o increased ALICIA and dyspnea; WT gained 15 pds w/o proper activity and no caloric counts; Little more LE edema may need more diuresis    Patient Active Problem List    Diagnosis Date Noted   • Presence of biventricular AICD 05/02/2016     Priority: High   • Persistent atrial fibrillation (CMS-HCC) 03/04/2016     Priority: High   • Atypical atrial flutter (CMS-HCC) 03/04/2016     Priority: High   • Anticoagulant long-term use 03/04/2016     Priority: High   • Ischemic cardiomyopathy 05/14/2013     Priority: High   • Hypertriglyceridemia 05/14/2013     Priority: High   • Coronary artery disease 06/05/2012     Priority: High   • HTN (hypertension) 06/05/2012     Priority: High   • Hyperlipidemia 06/05/2012     Priority: High   • Systolic CHF (CMS-HCC) 09/05/2014     Priority: Medium   • Sleep apnea 05/16/2013     Priority: Medium   • Type 2 diabetes mellitus without complication (CMS-[facility]) 06/05/2012     Priority: Medium   • Hypothyroid 09/03/2014     Priority: Low   • Depression 02/21/2014     Priority: Low   • Chronic paroxysmal hemicrania, not intractable 07/09/2015   • Pulmonary nodule 09/03/2014   • ED (erectile dysfunction) 08/20/2013   • Prostate cancer screening 06/25/2013   • Colon cancer screening 06/25/2013       Past Medical History   Diagnosis Date   • Coronary artery disease 2002     History of remote MI/stent   • HTN (hypertension)     • Hyperlipidemia     • Myocardial infarction (CMS-HCC) 2002   • Ischemic cardiomyopathy March 2015     Echocardiogram with LVEF 30-40%. Mild-moderate septal hypertrophy. Moderately enlarged LA.  Moderate-severely enlarged RA. Mild AR. Mild MR. Mild TR, RVSP 5-10mmHg.   • Systolic CHF (CMS-[facility])     • DIABETES MELLITUS      On insulin   • Chronic anticoagulation          • Atrial fibrillation, persistent (CMS-HCC)           • Dental disorder      Dentures   • COPD (chronic obstructive pulmonary disease) (CMS-[facility])    • Supplemental oxygen dependent    • Sleep apnea      Uses CPAP   • Breath shortness      On oxygen   • Cataract      Bilateral IOL   • Hypothyroidism    • Depression      Past Surgical History   Procedure Laterality Date   • Cataract extraction with iol Left 1980         • Cataract extraction with iol Right 2000         • Cardiac cath  2002   • Other neurological surg  03/2013     Laminectomy lumbar   • Recovery  3/18/2016     Procedure: CATH LAB BIV ICD INSERT ST.JUDE WINSLOW;  Surgeon: Recoveryonly Surgery;  Location: SURGERY PRE-POST PROC UNIT [facility] [place];  Service:    • Aicd implant  March 2016     [facility]-D implanted by Dr. Winslow.     Social History     Social History   • Marital Status:      Spouse Name: N/A   • Number of Children: N/A   • Years of Education: N/A     Social History Main Topics   • Smoking status: Former Smoker -- 0.50 packs/day for 50 years     Types: Cigarettes     Quit date: 11/01/2012   • Smokeless tobacco: Never Used   • Alcohol Use: No   • Drug Use: No   • Sexual Activity: Not Asked     Other Topics Concern   • None     Social History Narrative     Family History   Problem Relation Age of Onset   • Other Mother 84     failure to thrive   • Heart Disease Mother    • Heart Attack Father 72       Current Outpatient Prescriptions   Medication Sig Dispense Refill   • insulin NPH (HUMULIN,NOVOLIN) 100 UNIT/ML Suspension Inject 40 Units as instructed every bedtime.     • insulin regular human (HUMULIN/NOVOLIN R) Inject 25 Units as instructed 3 times a day before meals.     • lisinopril (PRINIVIL) 20 MG Tab Take 1 Tab by mouth every day. 90 Tab 3   • metformin (GLUCOPHAGE) 1000 MG tablet take 1 tablet by mouth twice a day WITH A MEAL 180 Tab 3   • atorvastatin (LIPITOR) 80 MG tablet Take 1 Tab by mouth every day. 90 Tab 3   • warfarin (COUMADIN) 5 MG Tab Take one-half  "to one tablet by mouth one time daily as directed by anticoagulation clinic 90 Tab 1   • levothyroxine (SYNTHROID) 100 MCG Tab Take 1 Tab by mouth Every morning on an empty stomach. 90 Tab 3   • Blood Glucose Monitoring Suppl SUPPLIES [employer] [facility] [employer] Freestyle Freedome Lite test strips. Use to test blood sugars 4-5 times daily as directed. Dx: ICD-10. E11.9, Z74.9 500 Strip 3   • furosemide (LASIX) 40 MG Tab Take 1 Tab by mouth 1 time daily as needed. 30 Tab 4   • carvedilol (COREG) 3.125 MG Tab Take 1 Tab by mouth 2 times a day, with meals. 180 Tab 2   • Insulin Syringe-Needle U-100 (INSULIN SYRINGE .3CC/31GX5/16\") 31G X 5/16\" 0.3 ML Misc For insulin shots 5 times a day 200 Each 6   • glucose blood (ONE TOUCH ULTRA TEST) strip Use to test blood sugar levels 5 times daily as directed by physician. ICD-10: E11.9, Z79.4 500 Strip 5     No current facility-administered medications for this visit.     Pcn    Review of Systems:   PPM; GAVINO on CPAP  Constitutional: Denies fevers, Gain weight changes  Eyes: Denies changes in vision, no eye pain  Ears/Nose/Throat/Mouth: Denies nasal congestion or sore throat   Cardiovascular: Denies chest pain or palpitations   Respiratory: ALICIA;  shortness of breath , Denies cough  Gastrointestinal/Hepatic: Denies abdominal pain, nausea, vomiting, diarrhea, constipation or GI bleeding   Genitourinary: Denies bladder dysfunction, dysuria or frequency  Musculoskeletal/Rheum: Denies  joint pain and swelling   Skin/Breast: Denies rash, denies breast lumps or discharge  Neurological: Denies headache, confusion, memory loss or focal weakness/parasthesias  Psychiatric: denies mood disorder   Endocrine: denies hx of diabetes or thyroid dysfunction  Heme/Oncology/Lymph Nodes: Denies enlarged lymph nodes, denies brusing or known bleeding disorder  Allergic/Immunologic:  hx of allergies      All other systems were reviewed and are negative (AMA/CMS criteria)      Blood pressure 122/60, pulse 70, height 1.829 m " "(6' 0.01\"), weight 109.317 kg (241 lb), SpO2 90 %.  General Appearance:  Obese;  Well developed, Well nourished, No acute distress, Non-toxic appearance. O2 NC suppl  HENT:  Normocephalic, Atraumatic, Oropharynx moist mucous membranes, Dentition: , Nose normal.    Eyes:  PERRLA, EOMI, Conjunctiva normal, No discharge.  Neck:  Normal range of motion, No cervical tenderness, Supple, No stridor, no JVD .  No thyromegaly.  No carotid bruit.  Cardiovascular:  Normal heart rate, Normal rhythm,  S1, S2, no S3, S4; No gallops; No murmurs, No rubs, .   Extremitites with intact distal pulses, no cyanosis, clubbing or edema.  No heaves, thrills, HJR;  Peripheral pulses: carotid 2+, brachial 2+, radial 2+, ulnar 2+, femoral 2+, popliteal 2+, PT 2+, DP 2+;  Lungs:  Respiratory effort is normal. Normal breath sounds, breath sounds clear to auscultation bilaterally,  no rales, no rhonchi, no wheezing.   Abdomen: Bowel sounds normal, Soft, No tenderness, No guarding, No rebound, No masses, No hepatosplenomegaly.  Skin: Warm, Dry, No erythema, No rash, no induration or crepitus.  Neurologic: Alert & oriented x 3, Normal motor function, Normal sensory function, No focal deficits noted, cranial nerves II through XII are normal,  normal gait.  Psychiatric: Affect normal, Judgment normal, Mood normal.      Assessment and Plan.   71 y.o. male has some improvement on LVEF on Echo but enlarged ascending aorta measurement is in question in terms how much it enlarged over 6 month? Will recheck in 6 months;   May need to lose WT and refit mask;     1. Cardiomyopathy, ischemic    - ECHOCARDIOGRAM COMP W/O CONT; Future    2. Coronary artery disease due to lipid rich plaque  asx    3. Presence of biventricular AICD  No firing    4. Ischemic cardiomyopathy  Discussed risks and educated about the subject related matters      5. Persistent atrial fibrillation (CMS-HCC)  Discussed risks and educated about the subject related matters      6. Atypical " atrial flutter (CMS-HCC)  asx    7. Anticoagulant long-term use  Warfarin, by INR coumadin clinic    8. Type 2 diabetes mellitus without complication, unspecified long term insulin use status (CMS-HCC)  Per Endocrine    9. Edema, unspecified type  Reviewed;    10. Ascending aorta enlargement (CMS-HCC)  reviewed  - ECHOCARDIOGRAM COMP W/O CONT; Future      Return to clinic in  6 , months    1. Cardiomyopathy, ischemic  ECHOCARDIOGRAM COMP W/O CONT   2. Coronary artery disease due to lipid rich plaque     3. Presence of biventricular AICD     4. Ischemic cardiomyopathy     5. Persistent atrial fibrillation (CMS-HCC)     6. Atypical atrial flutter (CMS-HCC)     7. Anticoagulant long-term use     8. Type 2 diabetes mellitus without complication, unspecified long term insulin use status (CMS-HCC)     9. Edema, unspecified type     10. Ascending aorta enlargement (CMS-[facility])  ECHOCARDIOGRAM COMP W/O CONT

## 2017-06-08 NOTE — Clinical Note
[facility] [place] [address],   [address]nd Floor  [place] [address]  Phone: [phone]  Fax: [phone]              Chirag Balderas  1945    Encounter Date: 6/8/2017    Thomas Zhang M.D.    Thank you for the referral. I had the pleasure of seeing Chirag Balderas today in cardiology clinic. I've attached my visit note below. If you have any questions please feel free to give me a call anytime.      Monika Catalan MD, PhD, [facility]  Cardiology and Lipidology  [facility]                                                                  PROGRESS NOTE:  Chief Complaint   Patient presents with   • Follow-Up     discuss echo results         This patient is an established male who is here today to discuss:  C/o increased ALICIA and dyspnea; WT gained 15 pds w/o proper activity and no caloric counts; Little more LE edema may need more diuresis    Patient Active Problem List    Diagnosis Date Noted   • Presence of biventricular AICD 05/02/2016     Priority: High   • Persistent atrial fibrillation (CMS-HCC) 03/04/2016     Priority: High   • Atypical atrial flutter (CMS-[facility]) 03/04/2016     Priority: High   • Anticoagulant long-term use 03/04/2016     Priority: High   • Ischemic cardiomyopathy 05/14/2013     Priority: High   • Hypertriglyceridemia 05/14/2013     Priority: High   • Coronary artery disease 06/05/2012     Priority: High   • HTN (hypertension) 06/05/2012     Priority: High   • Hyperlipidemia 06/05/2012     Priority: High   • Systolic CHF (CMS-[facility]) 09/05/2014     Priority: Medium   • Sleep apnea 05/16/2013     Priority: Medium   • Type 2 diabetes mellitus without complication (CMS-HCC) 06/05/2012     Priority: Medium   • Hypothyroid 09/03/2014     Priority: Low   • Depression 02/21/2014     Priority: Low   • Chronic paroxysmal hemicrania, not intractable 07/09/2015   • Pulmonary nodule 09/03/2014   • ED  (erectile dysfunction) 08/20/2013   • Prostate cancer screening 06/25/2013   • Colon cancer screening 06/25/2013       Past Medical History   Diagnosis Date   • Coronary artery disease 2002     History of remote MI/stent   • HTN (hypertension)     • Hyperlipidemia     • Myocardial infarction (CMS-HCC) 2002   • Ischemic cardiomyopathy March 2015     Echocardiogram with LVEF 30-40%. Mild-moderate septal hypertrophy. Moderately enlarged LA.  Moderate-severely enlarged RA. Mild AR. Mild MR. Mild TR, RVSP 5-10mmHg.   • Systolic CHF (CMS-HCC)     • DIABETES MELLITUS      On insulin   • Chronic anticoagulation          • Atrial fibrillation, persistent (CMS-HCC)          • Dental disorder      Dentures   • COPD (chronic obstructive pulmonary disease) (CMS-HCC)    • Supplemental oxygen dependent    • Sleep apnea      Uses CPAP   • Breath shortness      On oxygen   • Cataract      Bilateral IOL   • Hypothyroidism    • Depression      Past Surgical History   Procedure Laterality Date   • Cataract extraction with iol Left 1980         • Cataract extraction with iol Right 2000         • Cardiac cath  2002   • Other neurological surg  03/2013     Laminectomy lumbar   • Recovery  3/18/2016     Procedure: CATH LAB BIV ICD INSERT [facility] WINSLOW;  Surgeon: Recoveryonly Surgery;  Location: SURGERY PRE-POST PROC UNIT [facility] [place];  Service:    • Aicd implant  March 2016     [facility] [employer] 3365-40Q CRT-D implanted by Dr. Winslow.     Social History     Social History   • Marital Status:      Spouse Name: N/A   • Number of Children: N/A   • Years of Education: N/A     Social History Main Topics   • Smoking status: Former Smoker -- 0.50 packs/day for 50 years     Types: Cigarettes     Quit date: 11/01/2012   • Smokeless tobacco: Never Used   • Alcohol Use: No   • Drug Use: No   • Sexual Activity: Not Asked     Other Topics Concern   • None     Social History Narrative     Family History   Problem Relation Age of Onset   •  "Other Mother 84     failure to thrive   • Heart Disease Mother    • Heart Attack Father 72       Current Outpatient Prescriptions   Medication Sig Dispense Refill   • insulin NPH (HUMULIN,NOVOLIN) 100 UNIT/ML Suspension Inject 40 Units as instructed every bedtime.     • insulin regular human (HUMULIN/NOVOLIN R) Inject 25 Units as instructed 3 times a day before meals.     • lisinopril (PRINIVIL) 20 MG Tab Take 1 Tab by mouth every day. 90 Tab 3   • metformin (GLUCOPHAGE) 1000 MG tablet take 1 tablet by mouth twice a day WITH A MEAL 180 Tab 3   • atorvastatin (LIPITOR) 80 MG tablet Take 1 Tab by mouth every day. 90 Tab 3   • warfarin (COUMADIN) 5 MG Tab Take one-half to one tablet by mouth one time daily as directed by anticoagulation clinic 90 Tab 1   • levothyroxine (SYNTHROID) 100 MCG Tab Take 1 Tab by mouth Every morning on an empty stomach. 90 Tab 3   • Blood Glucose Monitoring Suppl SUPPLIES [employer] Freestyle Freedome Lite test strips. Use to test blood sugars 4-5 times daily as directed. Dx: ICD-10. E11.9, Z74.9 500 Strip 3   • furosemide (LASIX) 40 MG Tab Take 1 Tab by mouth 1 time daily as needed. 30 Tab 4   • carvedilol (COREG) 3.125 MG Tab Take 1 Tab by mouth 2 times a day, with meals. 180 Tab 2   • Insulin Syringe-Needle U-100 (INSULIN SYRINGE .3CC/31GX5/16\") 31G X 5/16\" 0.3 ML Misc For insulin shots 5 times a day 200 Each 6   • glucose blood (ONE TOUCH ULTRA TEST) strip Use to test blood sugar levels 5 times daily as directed by physician. ICD-10: E11.9, Z79.4 500 Strip 5     No current facility-administered medications for this visit.     Pcn    Review of Systems:   PPM; GAVINO on CPAP  Constitutional: Denies fevers, Gain weight changes  Eyes: Denies changes in vision, no eye pain  Ears/Nose/Throat/Mouth: Denies nasal congestion or sore throat   Cardiovascular: Denies chest pain or palpitations   Respiratory: ALICIA;  shortness of breath , Denies cough  Gastrointestinal/Hepatic: Denies abdominal pain, nausea, " "vomiting, diarrhea, constipation or GI bleeding   Genitourinary: Denies bladder dysfunction, dysuria or frequency  Musculoskeletal/Rheum: Denies  joint pain and swelling   Skin/Breast: Denies rash, denies breast lumps or discharge  Neurological: Denies headache, confusion, memory loss or focal weakness/parasthesias  Psychiatric: denies mood disorder   Endocrine: denies hx of diabetes or thyroid dysfunction  Heme/Oncology/Lymph Nodes: Denies enlarged lymph nodes, denies brusing or known bleeding disorder  Allergic/Immunologic:  hx of allergies      All other systems were reviewed and are negative (AMA/CMS criteria)      Blood pressure 122/60, pulse 70, height 1.829 m (6' 0.01\"), weight 109.317 kg (241 lb), SpO2 90 %.  General Appearance:  Obese;  Well developed, Well nourished, No acute distress, Non-toxic appearance. O2 NC suppl  HENT:  Normocephalic, Atraumatic, Oropharynx moist mucous membranes, Dentition: , Nose normal.    Eyes:  PERRLA, EOMI, Conjunctiva normal, No discharge.  Neck:  Normal range of motion, No cervical tenderness, Supple, No stridor, no JVD .  No thyromegaly.  No carotid bruit.  Cardiovascular:  Normal heart rate, Normal rhythm,  S1, S2, no S3, S4; No gallops; No murmurs, No rubs, .   Extremitites with intact distal pulses, no cyanosis, clubbing or edema.  No heaves, thrills, HJR;  Peripheral pulses: carotid 2+, brachial 2+, radial 2+, ulnar 2+, femoral 2+, popliteal 2+, PT 2+, DP 2+;  Lungs:  Respiratory effort is normal. Normal breath sounds, breath sounds clear to auscultation bilaterally,  no rales, no rhonchi, no wheezing.   Abdomen: Bowel sounds normal, Soft, No tenderness, No guarding, No rebound, No masses, No hepatosplenomegaly.  Skin: Warm, Dry, No erythema, No rash, no induration or crepitus.  Neurologic: Alert & oriented x 3, Normal motor function, Normal sensory function, No focal deficits noted, cranial nerves II through XII are normal,  normal gait.  Psychiatric: Affect normal, " Judgment normal, Mood normal.      Assessment and Plan.   71 y.o. male has some improvement on LVEF on Echo but enlarged ascending aorta measurement is in question in terms how much it enlarged over 6 month? Will recheck in 6 months;   May need to lose WT and refit mask;     1. Cardiomyopathy, ischemic    - ECHOCARDIOGRAM COMP W/O CONT; Future    2. Coronary artery disease due to lipid rich plaque  asx    3. Presence of biventricular AICD  No firing    4. Ischemic cardiomyopathy  Discussed risks and educated about the subject related matters      5. Persistent atrial fibrillation (CMS-HCC)  Discussed risks and educated about the subject related matters      6. Atypical atrial flutter (CMS-HCC)  asx    7. Anticoagulant long-term use  Warfarin, by INR coumadin clinic    8. Type 2 diabetes mellitus without complication, unspecified long term insulin use status (CMS-HCC)  Per Endocrine    9. Edema, unspecified type  Reviewed;    10. Ascending aorta enlargement (CMS-HCC)  reviewed  - ECHOCARDIOGRAM COMP W/O CONT; Future      Return to clinic in  6 , months    1. Cardiomyopathy, ischemic  ECHOCARDIOGRAM COMP W/O CONT   2. Coronary artery disease due to lipid rich plaque     3. Presence of biventricular AICD     4. Ischemic cardiomyopathy     5. Persistent atrial fibrillation (CMS-HCC)     6. Atypical atrial flutter (CMS-HCC)     7. Anticoagulant long-term use     8. Type 2 diabetes mellitus without complication, unspecified long term insulin use status (CMS-HCC)     9. Edema, unspecified type     10. Ascending aorta enlargement (CMS-HCC)  ECHOCARDIOGRAM COMP W/O CONT             Thomas Zhang M.D.  [address]  [place] [address]  VIA In Basket

## 2017-06-08 NOTE — MR AVS SNAPSHOT
"        Chirag Balderas   2017 2:40 PM   Office Visit   MRN: [id number]    Department:  Heart [facility]   Dept Phone:  [phone]    Description:  Male : 1945   Provider:  Monika Catalan MD,[facility]           Reason for Visit     Follow-Up discuss echo results      Allergies as of 2017     Allergen Noted Reactions    Pcn [Penicillins] 2012       swelling      You were diagnosed with     Cardiomyopathy, ischemic   [934946]       Coronary artery disease due to lipid rich plaque   [6197191]       Presence of biventricular AICD   [309880]       Ischemic cardiomyopathy   [993199]       Persistent atrial fibrillation (CMS-HCC)   [035235]       Atypical atrial flutter (CMS-HCC)   [955585]       Anticoagulant long-term use   [066288]       Type 2 diabetes mellitus without complication, unspecified long term insulin use status (CMS-HCC)   [2697658]       Edema, unspecified type   [3138383]       Ascending aorta enlargement (CMS-HCC)   [291051]         Vital Signs     Blood Pressure Pulse Height Weight Body Mass Index Oxygen Saturation    122/60 mmHg 70 1.829 m (6' 0.01\") 109.317 kg (241 lb) 32.68 kg/m2 90%    Smoking Status                   Former Smoker           Basic Information     Date Of Birth Sex Race Ethnicity Preferred Language    1945 Male White Non- English      Your appointments     Sep 08, 2017 10:00 AM   Established Patient with Indira Herrera M.D.   [facility]    [address], [address]  [facility] [address] [phone]           You will be receiving a confirmation call a few days before your appointment from our automated call confirmation system.            Nov 15, 2017  1:00 PM   PACER CHECK ONLY with SAKINA Peña   [facility] (--)    [address] [id number] [phone]            Dec 01, 2017 10:40 AM   FOLLOW UP with Monika Catalan, " MD,[facility] (--)    [address] [address]  [address]nd [address]  [facility] [address] [phone]              Problem List              ICD-10-CM Priority Class Noted - Resolved    Coronary artery disease I25.10 High  6/5/2012 - Present    HTN (hypertension) I10 High  6/5/2012 - Present    Type 2 diabetes mellitus without complication (CMS-HCC) E11.9 Medium  6/5/2012 - Present    Hyperlipidemia E78.5 High  6/5/2012 - Present    Ischemic cardiomyopathy I25.5 High  5/14/2013 - Present    Hypertriglyceridemia E78.1 High  5/14/2013 - Present    Sleep apnea G47.30 Medium  5/16/2013 - Present    Prostate cancer screening Z12.5   6/25/2013 - Present    Colon cancer screening Z12.11   6/25/2013 - Present    ED (erectile dysfunction) N52.9   8/20/2013 - Present    Depression F32.9 Low  2/21/2014 - Present    Hypothyroid E03.9 Low  9/3/2014 - Present    Pulmonary nodule R91.1   9/3/2014 - Present    Systolic CHF (CMS-[facility]) I50.20 Medium  9/5/2014 - Present    Chronic paroxysmal hemicrania, not intractable G44.049   7/9/2015 - Present    Persistent atrial fibrillation (CMS-[facility]) I48.1 High  3/4/2016 - Present    Atypical atrial flutter (CMS-[facility]) I48.4 High  3/4/2016 - Present    Anticoagulant long-term use Z79.01 High  3/4/2016 - Present    Presence of biventricular AICD Z95.810 High  5/2/2016 - Present      Health Maintenance        Date Due Completion Dates    IMM DTaP/Tdap/Td Vaccine (1 - Tdap) 9/8/1963 ---    IMM ZOSTER VACCINE 9/8/2004 ---    IMM PNEUMOCOCCAL 65+ (ADULT) LOW/MEDIUM RISK SERIES (2 of 2 - PCV13) 10/8/2013 10/8/2012    DIABETES MONOFILAMENT / LE EXAM 10/5/2017 10/5/2016, 5/7/2015 (N/S), 3/11/2014 (N/S)    Override on 5/7/2015: (N/S)    Override on 3/11/2014: (N/S)    URINE ACR / MICROALBUMIN 10/11/2017 10/11/2016, 11/10/2015, 3/5/2014, 6/20/2013, 6/19/2012    A1C SCREENING 10/12/2017 4/12/2017, 2/2/2017, 9/29/2016, 5/31/2016, 4/19/2016, 11/16/2015,  11/16/2015 (N/S), 8/10/2015, 5/7/2015, 5/7/2015 (N/S), 12/3/2014, 12/3/2014 (N/S), 9/3/2014, 6/11/2014, 6/11/2014 (N/S), 3/11/2014, 3/11/2014 (N/S), 12/3/2013, 12/3/2013 (N/S), 6/25/2013, 3/26/2013, 12/19/2012, 10/8/2012, 7/3/2012    Override on 11/16/2015: (N/S)    Override on 5/7/2015: (N/S)    Override on 12/3/2014: (N/S)    Override on 6/11/2014: (N/S)    Override on 3/11/2014: (N/S)    Override on 12/3/2013: (N/S)    RETINAL SCREENING 2/20/2018 2/20/2017 (N/S), 2/1/2016, 2/1/2016, 8/26/2015 (Done), 11/1/2014 (N/S), 12/19/2013 (N/S)    Override on 2/20/2017: (N/S)    Override on 8/26/2015: Done (Dr. Baez)    Override on 11/1/2014: (N/S)    Override on 12/19/2013: (N/S)    FASTING LIPID PROFILE 4/12/2018 4/12/2017, 9/29/2016, 11/10/2015, 1/5/2015, 10/9/2014, 3/5/2014, 6/20/2013, 6/19/2012    SERUM CREATININE 4/12/2018 4/12/2017, 9/29/2016, 3/19/2016, 3/16/2016, 11/10/2015, 6/29/2015, 4/28/2015, 1/30/2015, 1/5/2015, 10/9/2014, 8/26/2014, 8/25/2014, 8/24/2014, 8/23/2014, 8/22/2014, 3/5/2014, 6/20/2013, 5/17/2013, 6/19/2012    COLONOSCOPY 6/11/2024 6/11/2014 (Declined)    Override on 6/11/2014: Patient Declined            Current Immunizations     Influenza TIV (IM) 10/15/2014, 10/3/2013, 10/8/2012    Influenza Vaccine Adult HD 10/10/2016 10:07 AM    Influenza Vaccine Quad Inj (Preserved) 11/16/2015 10:26 AM    Pneumococcal polysaccharide vaccine (PPSV-23) 10/8/2012      Below and/or attached are the medications your provider expects you to take. Review all of your home medications and newly ordered medications with your provider and/or pharmacist. Follow medication instructions as directed by your provider and/or pharmacist. Please keep your medication list with you and share with your provider. Update the information when medications are discontinued, doses are changed, or new medications (including over-the-counter products) are added; and carry medication information at all times in the event of emergency  "situations     Allergies:  PCN - (reactions not documented)               Medications  Valid as of: June 08, 2017 -  3:12 PM    Generic Name Brand Name Tablet Size Instructions for use    Atorvastatin Calcium (Tab) LIPITOR 80 MG Take 1 Tab by mouth every day.        Blood Glucose Monitoring Suppl (Misc) Blood Glucose Monitoring Suppl SUPPLIES Freestyle Freedome Lite test strips. Use to test blood sugars 4-5 times daily as directed. Dx: ICD-10. E11.9, Z74.9        Carvedilol (Tab) COREG 3.125 MG Take 1 Tab by mouth 2 times a day, with meals.        Furosemide (Tab) LASIX 40 MG Take 1 Tab by mouth 1 time daily as needed.        Glucose Blood (Strip) glucose blood  Use to test blood sugar levels 5 times daily as directed by physician. ICD-10: E11.9, Z79.4        Insulin NPH Human (Isophane) (Suspension) HUMULIN,NOVOLIN 100 UNIT/ML Inject 40 Units as instructed every bedtime.        insulin regular human (HUMULIN/NOVOLIN R) HUMULIN/NOVOLIN R  Inject 25 Units as instructed 3 times a day before meals.        Insulin Syringe-Needle U-100 (Misc) INSULIN SYRINGE .3CC/31GX5/16\" 31G X 5/16\" 0.3 ML For insulin shots 5 times a day        Levothyroxine Sodium (Tab) SYNTHROID 100 MCG Take 1 Tab by mouth Every morning on an empty stomach.        Lisinopril (Tab) PRINIVIL 20 MG Take 1 Tab by mouth every day.        MetFORMIN HCl (Tab) GLUCOPHAGE 1000 MG take 1 tablet by mouth twice a day WITH A MEAL        Warfarin Sodium (Tab) COUMADIN 5 MG Take one-half to one tablet by mouth one time daily as directed by anticoagulation clinic        .                 Medicines prescribed today were sent to:     [facility] [address] [place] [address] - [address] [id number] [facility] [address]    Phone: [phone] Fax: [phone]    Open 24 Hours?: No      Medication refill instructions:       If your prescription bottle indicates you have medication refills left, it is not necessary to call your provider’s office. Please " contact your pharmacy and they will refill your medication.    If your prescription bottle indicates you do not have any refills left, you may request refills at any time through one of the following ways: The online [employer] system (except Urgent Care), by calling your provider’s office, or by asking your pharmacy to contact your provider’s office with a refill request. Medication refills are processed only during regular business hours and may not be available until the next business day. Your provider may request additional information or to have a follow-up visit with you prior to refilling your medication.   *Please Note: Medication refills are assigned a new Rx number when refilled electronically. Your pharmacy may indicate that no refills were authorized even though a new prescription for the same medication is available at the pharmacy. Please request the medicine by name with the pharmacy before contacting your provider for a refill.        Your To Do List     Future Labs/Procedures Complete By Expires    ECHOCARDIOGRAM COMP W/O CONT  As directed 6/8/2018         [employer] Access Code: Activation code not generated  Current [employer] Status: Active

## 2017-06-08 NOTE — Clinical Note
[facility] [address],   2nd [address]  [place] [address]  Phone: [phone]  Fax: [phone]              Chirag Balderas  1945    Encounter Date: 6/8/2017    Thomas Zhang M.D.    Thank you for the referral. I had the pleasure of seeing Chirag Balderas today in cardiology clinic. I've attached my visit note below. If you have any questions please feel free to give me a call anytime.      Monika Catalan MD, PhD, [facility]  Cardiology and Lipidology  [facility]                                                                  PROGRESS NOTE:  Chief Complaint   Patient presents with   • Follow-Up     discuss echo results         This patient is an established male who is here today to discuss:  C/o increased ALICIA and dyspnea; WT gained 15 pds w/o proper activity and no caloric counts; Little more LE edema may need more diuresis    Patient Active Problem List    Diagnosis Date Noted   • Presence of biventricular AICD 05/02/2016     Priority: High   • Persistent atrial fibrillation (CMS-HCC) 03/04/2016     Priority: High   • Atypical atrial flutter (CMS-[facility]) 03/04/2016     Priority: High   • Anticoagulant long-term use 03/04/2016     Priority: High   • Ischemic cardiomyopathy 05/14/2013     Priority: High   • Hypertriglyceridemia 05/14/2013     Priority: High   • Coronary artery disease 06/05/2012     Priority: High   • HTN (hypertension) 06/05/2012     Priority: High   • Hyperlipidemia 06/05/2012     Priority: High   • Systolic CHF (CMS-[facility]) 09/05/2014     Priority: Medium   • Sleep apnea 05/16/2013     Priority: Medium   • Type 2 diabetes mellitus without complication (CMS-HCC) 06/05/2012     Priority: Medium   • Hypothyroid 09/03/2014     Priority: Low   • Depression 02/21/2014     Priority: Low   • Chronic paroxysmal hemicrania, not intractable 07/09/2015   • Pulmonary nodule 09/03/2014   • ED  (erectile dysfunction) 08/20/2013   • Prostate cancer screening 06/25/2013   • Colon cancer screening 06/25/2013       Past Medical History   Diagnosis Date   • Coronary artery disease 2002     History of remote MI/stent   • HTN (hypertension)     • Hyperlipidemia     • Myocardial infarction (CMS-HCC) 2002   • Ischemic cardiomyopathy March 2015     Echocardiogram with LVEF 30-40%. Mild-moderate septal hypertrophy. Moderately enlarged LA.  Moderate-severely enlarged RA. Mild AR. Mild MR. Mild TR, RVSP 5-10mmHg.   • Systolic CHF (CMS-HCC)     • DIABETES MELLITUS      On insulin   • Chronic anticoagulation          • Atrial fibrillation, persistent (CMS-HCC)          • Dental disorder      Dentures   • COPD (chronic obstructive pulmonary disease) (CMS-HCC)    • Supplemental oxygen dependent    • Sleep apnea      Uses CPAP   • Breath shortness      On oxygen   • Cataract      Bilateral IOL   • Hypothyroidism    • Depression      Past Surgical History   Procedure Laterality Date   • Cataract extraction with iol Left 1980         • Cataract extraction with iol Right 2000         • Cardiac cath  2002   • Other neurological surg  03/2013     Laminectomy lumbar   • Recovery  3/18/2016     Procedure: CATH LAB BIV ICD INSERT [facility]JU[facility] WINSLOW;  Surgeon: Recoveryonly Surgery;  [place] [facility] [place]  Service:    • Aicd implant  March 2016     [employer] [id number] CRT-D implanted by Dr. Winslow.     Social History     Social History   • Marital Status:      Spouse Name: N/A   • Number of Children: N/A   • Years of Education: N/A     Social History Main Topics   • Smoking status: Former Smoker -- 0.50 packs/day for 50 years     Types: Cigarettes     Quit date: 11/01/2012   • Smokeless tobacco: Never Used   • Alcohol Use: No   • Drug Use: No   • Sexual Activity: Not Asked     Other Topics Concern   • None     Social History Narrative     Family History   Problem Relation Age of Onset   •  "Other Mother 84     failure to thrive   • Heart Disease Mother    • Heart Attack Father 72       Current Outpatient Prescriptions   Medication Sig Dispense Refill   • insulin NPH (HUMULIN,NOVOLIN) 100 UNIT/ML Suspension Inject 40 Units as instructed every bedtime.     • insulin regular human (HUMULIN/NOVOLIN R) Inject 25 Units as instructed 3 times a day before meals.     • lisinopril (PRINIVIL) 20 MG Tab Take 1 Tab by mouth every day. 90 Tab 3   • metformin (GLUCOPHAGE) 1000 MG tablet take 1 tablet by mouth twice a day WITH A MEAL 180 Tab 3   • atorvastatin (LIPITOR) 80 MG tablet Take 1 Tab by mouth every day. 90 Tab 3   • warfarin (COUMADIN) 5 MG Tab Take one-half to one tablet by mouth one time daily as directed by anticoagulation clinic 90 Tab 1   • levothyroxine (SYNTHROID) 100 MCG Tab Take 1 Tab by mouth Every morning on an empty stomach. 90 Tab 3   • Blood Glucose Monitoring Suppl SUPPLIES Misc Freestyle Freedome Lite test strips. Use to test blood sugars 4-5 times daily as directed. Dx: ICD-10. E11.9, Z74.9 500 Strip 3   • furosemide (LASIX) 40 MG Tab Take 1 Tab by mouth 1 time daily as needed. 30 Tab 4   • carvedilol (COREG) 3.125 MG Tab Take 1 Tab by mouth 2 times a day, with meals. 180 Tab 2   • Insulin Syringe-Needle U-100 (INSULIN SYRINGE .3CC/31GX5/16\") 31G X 5/16\" 0.3 ML Misc For insulin shots 5 times a day 200 Each 6   • glucose blood (ONE TOUCH ULTRA TEST) strip Use to test blood sugar levels 5 times daily as directed by physician. ICD-10: E11.9, Z79.4 500 Strip 5     No current facility-administered medications for this visit.     Pcn    Review of Systems:   PPM; GAVINO on CPAP  Constitutional: Denies fevers, Gain weight changes  Eyes: Denies changes in vision, no eye pain  Ears/Nose/Throat/Mouth: Denies nasal congestion or sore throat   Cardiovascular: Denies chest pain or palpitations   Respiratory: ALICIA;  shortness of breath , Denies cough  Gastrointestinal/Hepatic: Denies abdominal pain, nausea, " "vomiting, diarrhea, constipation or GI bleeding   Genitourinary: Denies bladder dysfunction, dysuria or frequency  Musculoskeletal/Rheum: Denies  joint pain and swelling   Skin/Breast: Denies rash, denies breast lumps or discharge  Neurological: Denies headache, confusion, memory loss or focal weakness/parasthesias  Psychiatric: denies mood disorder   Endocrine: denies hx of diabetes or thyroid dysfunction  Heme/Oncology/Lymph Nodes: Denies enlarged lymph nodes, denies brusing or known bleeding disorder  Allergic/Immunologic:  hx of allergies      All other systems were reviewed and are negative (AMA/CMS criteria)      Blood pressure 122/60, pulse 70, height 1.829 m (6' 0.01\"), weight 109.317 kg (241 lb), SpO2 90 %.  General Appearance:  Obese;  Well developed, Well nourished, No acute distress, Non-toxic appearance. O2 NC suppl  HENT:  Normocephalic, Atraumatic, Oropharynx moist mucous membranes, Dentition: , Nose normal.    Eyes:  PERRLA, EOMI, Conjunctiva normal, No discharge.  Neck:  Normal range of motion, No cervical tenderness, Supple, No stridor, no JVD .  No thyromegaly.  No carotid bruit.  Cardiovascular:  Normal heart rate, Normal rhythm,  S1, S2, no S3, S4; No gallops; No murmurs, No rubs, .   Extremitites with intact distal pulses, no cyanosis, clubbing or edema.  No heaves, thrills, HJR;  Peripheral pulses: carotid 2+, brachial 2+, radial 2+, ulnar 2+, femoral 2+, popliteal 2+, PT 2+, DP 2+;  Lungs:  Respiratory effort is normal. Normal breath sounds, breath sounds clear to auscultation bilaterally,  no rales, no rhonchi, no wheezing.   Abdomen: Bowel sounds normal, Soft, No tenderness, No guarding, No rebound, No masses, No hepatosplenomegaly.  Skin: Warm, Dry, No erythema, No rash, no induration or crepitus.  Neurologic: Alert & oriented x 3, Normal motor function, Normal sensory function, No focal deficits noted, cranial nerves II through XII are normal,  normal gait.  Psychiatric: Affect normal, " Judgment normal, Mood normal.      Assessment and Plan.   71 y.o. male has some improvement on LVEF on Echo but enlarged ascending aorta measurement is in question in terms how much it enlarged over 6 month? Will recheck in 6 months;   May need to lose WT and refit mask;     1. Cardiomyopathy, ischemic    - ECHOCARDIOGRAM COMP W/O CONT; Future    2. Coronary artery disease due to lipid rich plaque  asx    3. Presence of biventricular AICD  No firing    4. Ischemic cardiomyopathy  Discussed risks and educated about the subject related matters      5. Persistent atrial fibrillation (CMS-HCC)  Discussed risks and educated about the subject related matters      6. Atypical atrial flutter (CMS-HCC)  asx    7. Anticoagulant long-term use  Warfarin, by INR coumadin clinic    8. Type 2 diabetes mellitus without complication, unspecified long term insulin use status (CMS-HCC)  Per Endocrine    9. Edema, unspecified type  Reviewed;    10. Ascending aorta enlargement (CMS-HCC)  reviewed  - ECHOCARDIOGRAM COMP W/O CONT; Future      Return to clinic in  *** weeks, months    1. Cardiomyopathy, ischemic  ECHOCARDIOGRAM COMP W/O CONT   2. Coronary artery disease due to lipid rich plaque     3. Presence of biventricular AICD     4. Ischemic cardiomyopathy     5. Persistent atrial fibrillation (CMS-HCC)     6. Atypical atrial flutter (CMS-HCC)     7. Anticoagulant long-term use     8. Type 2 diabetes mellitus without complication, unspecified long term insulin use status (CMS-HCC)     9. Edema, unspecified type     10. Ascending aorta enlargement (CMS-HCC)  ECHOCARDIOGRAM COMP W/O CONT             Thomas Zhang M.D.  [address]  [facility] [address]  VIA In Basket

## 2017-06-15 DIAGNOSIS — E10.37X9: ICD-10-CM

## 2017-06-16 ENCOUNTER — TELEPHONE (OUTPATIENT)
Dept: CARDIOLOGY | Facility: MEDICAL CENTER | Age: 72
End: 2017-06-16

## 2017-06-16 ENCOUNTER — ANTICOAGULATION MONITORING (OUTPATIENT)
Dept: VASCULAR LAB | Facility: MEDICAL CENTER | Age: 72
End: 2017-06-16

## 2017-06-16 DIAGNOSIS — Z79.899 HIGH RISK MEDICATION USE: ICD-10-CM

## 2017-06-16 DIAGNOSIS — I10 ESSENTIAL HYPERTENSION: ICD-10-CM

## 2017-06-16 DIAGNOSIS — I50.22 CHRONIC SYSTOLIC CONGESTIVE HEART FAILURE (HCC): ICD-10-CM

## 2017-06-16 LAB — INR PPP: 2.9 (ref 2–3.5)

## 2017-06-16 RX ORDER — FUROSEMIDE 40 MG/1
TABLET ORAL
Qty: 30 TAB | Refills: 6 | Status: SHIPPED | OUTPATIENT
Start: 2017-06-16 | End: 2018-04-13 | Stop reason: SDUPTHER

## 2017-06-16 NOTE — PROGRESS NOTES
Anticoagulation Summary as of 6/16/2017     INR goal 2.0-3.0    Selected INR 2.9 (6/16/2017)    Maintenance plan 2.5 mg (5 mg x 0.5) on Mon, Wed, Fri; 5 mg (5 mg x 1) all other days    Weekly total 27.5 mg    Plan last modified Suresh Hammonds, PHARMD (5/25/2017)    Next INR check 6/30/2017    Target end date Indefinite      Anticoagulation Episode Summary     INR check location Outside Lab    Preferred lab     Send INR reminders to     Comments       Anticoagulation Care Providers     Provider Role Specialty Phone number    Zain Winslow M.D. Referring Cardiac Electrophysiology [phone]    ROSANA LimaD Responsible          Anticoagulation Patient Findings    Patient's INR was therapeutic.   Denies any unusual s/s of bleeding, bruising, clotting.  Denies any changes to:   Diet   Medications  Confirmed dosing regimen.    Pt is to continue with current warfarin dosing regimen.    Follow up in 2 week(s)    Trevor Winston, PHARMD

## 2017-06-16 NOTE — TELEPHONE ENCOUNTER
Medication Refill        Lillie Manzano, Med Ass't   Nadeen Silverio R.N. (You)   58 minutes ago   (9:40 AM)    Please see below from EC  Thank you (Routing comment)    Monika Catalan MD,[facility]   Lillie Manzano, Med Ass't   1 hour ago   (9:17 AM)    Pls order BMP; Thx (Routing comment)     BMP ordered and pt notified. Pt states that he will go to [facility] in [place] next week.

## 2017-07-03 ENCOUNTER — ANTICOAGULATION MONITORING (OUTPATIENT)
Dept: VASCULAR LAB | Facility: MEDICAL CENTER | Age: 72
End: 2017-07-03

## 2017-07-03 LAB — INR PPP: 6.8 (ref 2–3.5)

## 2017-07-03 NOTE — PROGRESS NOTES
Anticoagulation Summary as of 7/3/2017     INR goal 2.0-3.0    Selected INR 6.8! (7/3/2017)    Maintenance plan 2.5 mg (5 mg x 0.5) on Mon, Wed, Fri; 5 mg (5 mg x 1) all other days    Weekly total 27.5 mg    Plan last modified ROSANA GodfreyD (5/25/2017)    Next INR check 7/7/2017    Target end date Indefinite      Anticoagulation Episode Summary     INR check location Outside Lab    Preferred lab     Send INR reminders to     Comments       Anticoagulation Care Providers     Provider Role Specialty Phone number    Zain Winslow M.D. Referring Cardiac Electrophysiology [phone]    ROSANA LimaD Responsible          Anticoagulation Patient Findings    Spoke with Agustín to report a supra therapeutic INR of 6.8 .  Confirmed no duplicate dosing, no ETOH, no cranberry, no pomegranate. Will HOLD warfarin for 3 days, then resume current dosign regimen. Follow up in 4 days'  Pt will drink a can of V-8 tonight.  Cherelle Nixon, ROSANAD

## 2017-07-07 LAB — INR PPP: 3.1 (ref 2–3.5)

## 2017-07-10 ENCOUNTER — ANTICOAGULATION MONITORING (OUTPATIENT)
Dept: VASCULAR LAB | Facility: MEDICAL CENTER | Age: 72
End: 2017-07-10

## 2017-07-10 NOTE — PROGRESS NOTES
Anticoagulation Summary as of 7/10/2017     INR goal 2.0-3.0    Selected INR 3.1! (7/7/2017)    Maintenance plan 2.5 mg (5 mg x 0.5) on Mon, Wed, Fri; 5 mg (5 mg x 1) all other days    Weekly total 27.5 mg    Plan last modified ROSANA GodfreyD (5/25/2017)    Next INR check 7/17/2017    Target end date Indefinite      Anticoagulation Episode Summary     INR check location Outside Lab    Preferred lab     Send INR reminders to     Comments       Anticoagulation Care Providers     Provider Role Specialty Phone number    Zain Winslow M.D. Referring Cardiac Electrophysiology [phone]    ROSANA LimaD Responsible          Anticoagulation Patient Findings    Spoke with pt.   INR was slightly SUPRA therapeutic, however pt had already held warfarin dose 3 days, so will just have pt continue with normal warfarin dosing at this point.   Pt denies any unusual s/s of bleeding, bruising, clotting or any changes to diet or medications.  Recheck INR in 1 week per pt preference.     Alley Alcala, PHARMD

## 2017-07-12 ENCOUNTER — TELEPHONE (OUTPATIENT)
Dept: ENDOCRINOLOGY | Facility: MEDICAL CENTER | Age: 72
End: 2017-07-12

## 2017-07-12 NOTE — TELEPHONE ENCOUNTER
Blood glucose logs reviewed.    Patient called and instructed to increase his Regular insulin at meals to 30 units if his blood sugars was greater than 150, continue with 25 units for blood sugars less than 150

## 2017-07-21 ENCOUNTER — HOSPITAL ENCOUNTER (OUTPATIENT)
Dept: CARDIOLOGY | Facility: MEDICAL CENTER | Age: 72
End: 2017-07-21
Payer: MEDICARE

## 2017-07-21 NOTE — PROGRESS NOTES
OP Anticoagulation Telephone Note    Date: 7/21/2017  Physician Name: Zain Winslow  Duration of Therapy : Indefinite  Reason for Anticoagulation: Atrial Fibrillation  Previous Regimen (mg / week): 27.5  New Regimen (mg / week): 27.5  Assessment: Therapeutic  Target INR: 2.0 to 3.0  Comments: INR 2.4 (Jessica JAIN)    Plan:  Left message for patient regarding Therapeutic INR.   Requested pt call if any unusual s/s of bleeding, bruising or clotting are present or if there have been any changes to meds or diet.  Stated warfarin dosing schedule are instructed patient to continue with current regimen.    Pt to follow up in 2 weeks.     Medication: Warfarin (Coumadin)     Sunday Monday Tuesday Wednesday Thursday Friday Saturday      5 mg    2.5 mg    5 mg    2.5 mg    5 mg    2.5 mg    5 mg      1 tab(s)    0.5 tab(s)    1 tab(s)    0.5 tab(s)    1 tab(s)    0.5 tab(s)  1 tab(s)     Next Appointment: Friday , 8/3/17       Kaleigh Rhodes, Pharmacy Int    I have reviewed and concur with the above plan on 07/25/2017.  Cherelle Nixon [facility]

## 2017-07-21 NOTE — MR AVS SNAPSHOT
Chirag Balderas   2017 11:15 AM   Telephone   MRN: [id number]    Department:  Cardiology [facility]   Dept Phone:  [phone]    Description:  Male : 1945   Provider:  TELEPHONE CLINIC           Allergies as of 2017     Allergen Noted Reactions    Pcn [Penicillins] 2012       swelling      Vital Signs     Smoking Status                   Former Smoker           Basic Information     Date Of Birth Sex Race Ethnicity Preferred Language    1945 Male White Non- English      Your appointments     Sep 08, 2017 10:00 AM   Established Patient with Indira Herrera M.D.   [facility]    [address] [facility], [address]  [facility] [address] [phone]           You will be receiving a confirmation call a few days before your appointment from our automated call confirmation system.            2017 11:00 AM   [place] with [facility]   [facility] (--)    [address]  [facility] [address]   [phone]            Nov 15, 2017  1:00 PM   PACER CHECK ONLY with SAKINA Peña   [facility] ([facility]    [address] [facility] [id number] [phone]            Dec 01, 2017 10:40 AM   FOLLOW UP with Monika Catalan MD,[facility]    [address]  [address]  [facility] [id number] [phone]              Problem List              ICD-10-CM Priority Class Noted - Resolved    Coronary artery disease I25.10 High  2012 - Present    HTN (hypertension) I10 High  2012 - Present    Type 2 diabetes mellitus without complication (CMS-HCC) E11.9 Medium  2012 - Present    Hyperlipidemia E78.5 High  2012 - Present    Ischemic cardiomyopathy I25.5 High  2013 - Present    Hypertriglyceridemia E78.1 High  2013 - Present    Sleep apnea G47.30 Medium  2013 - Present    Prostate cancer screening  Z12.5   6/25/2013 - Present    Colon cancer screening Z12.11   6/25/2013 - Present    ED (erectile dysfunction) N52.9   8/20/2013 - Present    Depression F32.9 Low  2/21/2014 - Present    Hypothyroid E03.9 Low  9/3/2014 - Present    Pulmonary nodule R91.1   9/3/2014 - Present    Systolic CHF (CMS-HCC) I50.20 Medium  9/5/2014 - Present    Chronic paroxysmal hemicrania, not intractable G44.049   7/9/2015 - Present    Persistent atrial fibrillation (CMS-HCC) I48.1 High  3/4/2016 - Present    Atypical atrial flutter (CMS-[facility]) I48.4 High  3/4/2016 - Present    Anticoagulant long-term use Z79.01 High  3/4/2016 - Present    Presence of biventricular AICD Z95.810 High  5/2/2016 - Present      Health Maintenance        Date Due Completion Dates    IMM DTaP/Tdap/Td Vaccine (1 - Tdap) 9/8/1963 ---    IMM ZOSTER VACCINE 9/8/2004 ---    IMM PNEUMOCOCCAL 65+ (ADULT) LOW/MEDIUM RISK SERIES (2 of 2 - PCV13) 10/8/2013 10/8/2012    IMM INFLUENZA (1) 9/1/2017 10/10/2016, 11/16/2015, 10/15/2014, 10/3/2013, 10/8/2012    URINE ACR / MICROALBUMIN 10/11/2017 10/11/2016, 11/10/2015, 3/5/2014, 6/20/2013, 6/19/2012    A1C SCREENING 10/12/2017 4/12/2017, 2/2/2017, 9/29/2016, 5/31/2016, 4/19/2016, 11/16/2015, 11/16/2015 (N/S), 8/10/2015, 5/7/2015, 5/7/2015 (N/S), 12/3/2014, 12/3/2014 (N/S), 9/3/2014, 6/11/2014, 6/11/2014 (N/S), 3/11/2014, 3/11/2014 (N/S), 12/3/2013, 12/3/2013 (N/S), 6/25/2013, 3/26/2013, 12/19/2012, 10/8/2012, 7/3/2012    Override on 11/16/2015: (N/S)    Override on 5/7/2015: (N/S)    Override on 12/3/2014: (N/S)    Override on 6/11/2014: (N/S)    Override on 3/11/2014: (N/S)    Override on 12/3/2013: (N/S)    RETINAL SCREENING 2/20/2018 2/20/2017 (N/S), 2/1/2016, 2/1/2016, 8/26/2015 (Done), 11/1/2014 (N/S), 12/19/2013 (N/S)    Override on 2/20/2017: (N/S)    Override on 8/26/2015: Done (Dr. Baez)    Override on 11/1/2014: (N/S)    Override on 12/19/2013: (N/S)    FASTING LIPID PROFILE 4/12/2018 4/12/2017, 9/29/2016, 11/10/2015,  1/5/2015, 10/9/2014, 3/5/2014, 6/20/2013, 6/19/2012    SERUM CREATININE 6/19/2018 6/19/2017, 4/12/2017, 9/29/2016, 3/19/2016, 3/16/2016, 11/10/2015, 6/29/2015, 4/28/2015, 1/30/2015, 1/5/2015, 10/9/2014, 8/26/2014, 8/25/2014, 8/24/2014, 8/23/2014, 8/22/2014, 3/5/2014, 6/20/2013, 5/17/2013, 6/19/2012    DIABETES MONOFILAMENT / LE EXAM 6/27/2018 6/27/2017, 10/5/2016, 5/7/2015 (N/S), 3/11/2014 (N/S)    Override on 5/7/2015: (N/S)    Override on 3/11/2014: (N/S)    COLONOSCOPY 6/11/2024 6/11/2014 (Declined)    Override on 6/11/2014: Patient Declined            Current Immunizations     Influenza TIV (IM) 10/15/2014, 10/3/2013, 10/8/2012    Influenza Vaccine Adult HD 10/10/2016 10:07 AM    Influenza Vaccine Quad Inj (Preserved) 11/16/2015 10:26 AM    Pneumococcal polysaccharide vaccine (PPSV-23) 10/8/2012      Below and/or attached are the medications your provider expects you to take. Review all of your home medications and newly ordered medications with your provider and/or pharmacist. Follow medication instructions as directed by your provider and/or pharmacist. Please keep your medication list with you and share with your provider. Update the information when medications are discontinued, doses are changed, or new medications (including over-the-counter products) are added; and carry medication information at all times in the event of emergency situations     Allergies:  PCN - (reactions not documented)               Medications  Valid as of: July 21, 2017 - 11:15 AM    Generic Name Brand Name Tablet Size Instructions for use    Atorvastatin Calcium (Tab) LIPITOR 80 MG Take 1 Tab by mouth every day.        Blood Glucose Monitoring Suppl (Misc) Blood Glucose Monitoring Suppl SUPPLIES Freestyle Freedome Lite test strips. Use to test blood sugars 4-5 times daily as directed. Dx: ICD-10. E11.9, Z74.9        Carvedilol (Tab) COREG 3.125 MG Take 1 Tab by mouth 2 times a day, with meals.        Furosemide (Tab) LASIX 40 MG  "TAKE ONE TABLET BY MOUTH ONCE DAILY AS NEEDED        Glucose Blood (Strip) glucose blood  Use to test blood sugar levels 5 times daily as directed by physician. ICD-10: E11.9, Z79.4        Insulin NPH Human (Isophane) (Suspension) HUMULIN,NOVOLIN 100 UNIT/ML Inject 40 Units as instructed every bedtime.        insulin regular human (HUMULIN/NOVOLIN R) HUMULIN/NOVOLIN R  Inject 25 Units as instructed 3 times a day before meals.        Insulin Syringe-Needle U-100 (Misc) INSULIN SYRINGE .3CC/31GX5/16\" 31G X 5/16\" 0.3 ML For insulin shots 5 times a day        Levothyroxine Sodium (Tab) SYNTHROID 100 MCG Take 1 Tab by mouth Every morning on an empty stomach.        Lisinopril (Tab) PRINIVIL 20 MG Take 1 Tab by mouth every day.        MetFORMIN HCl (Tab) GLUCOPHAGE 1000 MG take 1 tablet by mouth twice a day WITH A MEAL        Warfarin Sodium (Tab) COUMADIN 5 MG Take one-half to one tablet by mouth one time daily as directed by anticoagulation clinic        .                 Medicines prescribed today were sent to:     [facility] [address] [place], [place] [address] [facility] [address]    Phone: [phone] Fax: [phone]    Open 24 Hours?: No      Medication refill instructions:       If your prescription bottle indicates you have medication refills left, it is not necessary to call your provider’s office. Please contact your pharmacy and they will refill your medication.    If your prescription bottle indicates you do not have any refills left, you may request refills at any time through one of the following ways: The online [employer] system (except Urgent Care), by calling your provider’s office, or by asking your pharmacy to contact your provider’s office with a refill request. Medication refills are processed only during regular business hours and may not be available until the next business day. Your provider may request additional information or to have a follow-up visit with you " prior to refilling your medication.   *Please Note: Medication refills are assigned a new Rx number when refilled electronically. Your pharmacy may indicate that no refills were authorized even though a new prescription for the same medication is available at the pharmacy. Please request the medicine by name with the pharmacy before contacting your provider for a refill.           Enter[employer] Magpie[employer] Access Code: Activation code not generated  Current Shoplins Status: Active

## 2017-08-04 ENCOUNTER — ANTICOAGULATION MONITORING (OUTPATIENT)
Dept: VASCULAR LAB | Facility: MEDICAL CENTER | Age: 72
End: 2017-08-04

## 2017-08-04 LAB — INR PPP: 4.4 (ref 2–3.5)

## 2017-08-05 NOTE — PROGRESS NOTES
Anticoagulation Summary as of 8/4/2017     INR goal 2.0-3.0    Selected INR 4.4! (8/4/2017)    Maintenance plan 5 mg (5 mg x 1) on Mon, Wed, Fri; 2.5 mg (5 mg x 0.5) all other days    Weekly total 25 mg    Plan last modified Rafat Marcial, PHARMFAITH (8/4/2017)    Next INR check 8/11/2017    Target end date Indefinite      Anticoagulation Episode Summary     INR check location Outside Lab    Preferred lab     Send INR reminders to     Comments       Anticoagulation Care Providers     Provider Role Specialty Phone number    Zain Winslow M.D. Referring Cardiac Electrophysiology [phone]    ROSANA LimaD Responsible          Anticoagulation Patient Findings   Negatives Missed Doses, Extra Doses, Medication Changes, Antibiotic Use, Diet Changes, Dental/Other Procedures, Hospitalization, Bleeding Gums, Nose Bleeds, Blood in Urine, Blood in Stool, Any Bruising, Other Complaints        Spoke with patient today regarding supratherapeutic INR of 4.4.  Patient denies any signs/symptoms of bruising or bleeding or any changes in diet and medications.  Instructed patient to call clinic with any questions or concerns.  Denies extra doses, ETOH, cranberries/juice, or decrease in VitK rich foods.  Instructed patient to HOLD X 1, then decrease weekly warfarin regimen by ~9% as detailed above.  Follow up in 1 weeks.    Rafat Marcial, ROSANAD

## 2017-08-08 DIAGNOSIS — I25.5 ISCHEMIC CARDIOMYOPATHY: ICD-10-CM

## 2017-08-08 RX ORDER — CARVEDILOL 3.12 MG/1
3.12 TABLET ORAL 2 TIMES DAILY WITH MEALS
Qty: 180 TAB | Refills: 3 | Status: SHIPPED | OUTPATIENT
Start: 2017-08-08 | End: 2018-09-19 | Stop reason: SDUPTHER

## 2017-08-18 LAB — INR PPP: 2.3 (ref 2–3.5)

## 2017-08-22 ENCOUNTER — ANTICOAGULATION MONITORING (OUTPATIENT)
Dept: VASCULAR LAB | Facility: MEDICAL CENTER | Age: 72
End: 2017-08-22

## 2017-08-22 NOTE — PROGRESS NOTES
Anticoagulation Summary as of 8/22/2017     INR goal 2.0-3.0    Selected INR 2.3 (8/18/2017)    Maintenance plan 5 mg (5 mg x 1) on Mon, Wed, Fri; 2.5 mg (5 mg x 0.5) all other days    Weekly total 25 mg    Plan last modified ROSANA BotelloD (8/4/2017)    Next INR check 9/1/2017    Target end date Indefinite      Anticoagulation Episode Summary     INR check location Outside Lab    Preferred lab     Send INR reminders to     Comments       Anticoagulation Care Providers     Provider Role Specialty Phone number    Zain Winslow M.D. Referring Cardiac Electrophysiology [phone]    Cherelle Nixon, PHARMD Responsible          Anticoagulation Patient Findings    See note by ROSANA MacielD

## 2017-08-22 NOTE — PROGRESS NOTES
OP Anticoagulation Telephone Note    Date: 8/22/2017  Physician Name: Zain Winslow  Duration of Therapy : Indefinite  Reason for Anticoagulation: Atrial Fibrillation  Previous Regimen (mg / week): 25  New Regimen (mg / week): 25  Signs & Symptoms of Anticoagulation: None  Signs & Symptoms of Thrombosis: None  Compliance: Patient is Compliant  Assessment: Therapeutic  Target INR: 2.0 to 3.0  Comments: INR 2.3 LF Alericardo    Plan:  Spoke with patient on the phone. Patient denied any s/sxs of bleeding, denies any changes in diet or medications. INR is therapeutic today at 2.3. Continue current dosing regimen, follow up INR in 2 weeks.    Medication: Warfarin (Coumadin)     Sunday Monday Tuesday Wednesday Thursday Friday Saturday      2.5 mg    5 mg    2.5 mg    5 mg    2.5 mg    5 mg    2.5 mg      0.5 tab(s)    1 tab(s)    0.5 tab(s)    1 tab(s)    0.5 tab(s)    1 tab(s)  0.5 tab(s)     Next Appointment: Friday , September 1       Sulaiman Singleton, Pharmacy Int    I have reviewed and concur with the above plan on 08/22/2017.  Cherelle Nixon [facility]

## 2017-08-29 PROBLEM — J44.1 CHRONIC OBSTRUCTIVE PULMONARY DISEASE WITH ACUTE EXACERBATION (HCC): Status: ACTIVE | Noted: 2017-08-29

## 2017-09-01 ENCOUNTER — ANTICOAGULATION MONITORING (OUTPATIENT)
Dept: VASCULAR LAB | Facility: MEDICAL CENTER | Age: 72
End: 2017-09-01

## 2017-09-01 LAB — INR PPP: 3.5 (ref 2–3.5)

## 2017-09-01 NOTE — PROGRESS NOTES
Anticoagulation Summary  As of 9/1/2017    INR goal:   2.0-3.0   TTR:   42.8 % (1.5 y)   Today's INR:   3.5!   Maintenance plan:   5 mg (5 mg x 1) on Mon, Wed, Fri; 2.5 mg (5 mg x 0.5) all other days   Weekly total:   25 mg   Plan last modified:   Rafat Marcial PharmD (8/4/2017)   Next INR check:   9/15/2017   Target end date:   Indefinite         Anticoagulation Episode Summary     INR check location:   Outside Lab    Preferred lab:       Send INR reminders to:       Comments:         Anticoagulation Care Providers     Provider Role Specialty Phone number    Zain Winslow M.D. Referring Cardiac Electrophysiology [phone]    Anna LimaD Responsible          Anticoagulation Patient Findings  Patient Findings     Negatives:   Signs/symptoms of thrombosis, Signs/symptoms of bleeding, Laboratory test error suspected, Change in health, Change in alcohol use, Change in activity, Upcoming invasive procedure, Emergency department visit, Upcoming dental procedure, Missed doses, Extra doses, Change in medications, Change in diet/appetite, Hospital admission, Bruising, Other complaints        Spoke with patient today regarding supratherapeutic INR of 3.5.  Patient denies any signs/symptoms of bruising or bleeding or any changes in diet and medications.  Instructed patient to call clinic with any questions or concerns.  Patient has been taking higher weekly warfarin regimen than instructed.  He will take 2.5mg X 1, then start with lower warfarin regimen beginning tomorrow.  Follow up in 2 weeks.    Rafat Marcial PharmD

## 2017-09-08 ENCOUNTER — OFFICE VISIT (OUTPATIENT)
Dept: ENDOCRINOLOGY | Facility: MEDICAL CENTER | Age: 72
End: 2017-09-08
Payer: MEDICARE

## 2017-09-08 ENCOUNTER — APPOINTMENT (OUTPATIENT)
Dept: ENDOCRINOLOGY | Facility: MEDICAL CENTER | Age: 72
End: 2017-09-08
Payer: MEDICARE

## 2017-09-08 VITALS
DIASTOLIC BLOOD PRESSURE: 68 MMHG | BODY MASS INDEX: 32.18 KG/M2 | OXYGEN SATURATION: 88 % | SYSTOLIC BLOOD PRESSURE: 124 MMHG | HEART RATE: 83 BPM | WEIGHT: 237.6 LBS | HEIGHT: 72 IN

## 2017-09-08 DIAGNOSIS — I10 ESSENTIAL HYPERTENSION: ICD-10-CM

## 2017-09-08 DIAGNOSIS — Z79.4 TYPE 2 DIABETES MELLITUS WITH HYPERGLYCEMIA, WITH LONG-TERM CURRENT USE OF INSULIN (HCC): ICD-10-CM

## 2017-09-08 DIAGNOSIS — E11.65 TYPE 2 DIABETES MELLITUS WITH HYPERGLYCEMIA, WITH LONG-TERM CURRENT USE OF INSULIN (HCC): ICD-10-CM

## 2017-09-08 DIAGNOSIS — E03.9 ACQUIRED HYPOTHYROIDISM: ICD-10-CM

## 2017-09-08 DIAGNOSIS — E78.2 MIXED HYPERLIPIDEMIA: ICD-10-CM

## 2017-09-08 LAB
HBA1C MFR BLD: 9.4 % (ref ?–5.8)
INT CON NEG: NORMAL
INT CON POS: NORMAL

## 2017-09-08 PROCEDURE — 99214 OFFICE O/P EST MOD 30 MIN: CPT | Performed by: INTERNAL MEDICINE

## 2017-09-08 PROCEDURE — 83036 HEMOGLOBIN GLYCOSYLATED A1C: CPT | Performed by: INTERNAL MEDICINE

## 2017-09-08 NOTE — PROGRESS NOTES
Endocrinology Clinic Progress Note  PCP: Thomas Zhang M.D.    CC: Type 2 diabetes    HPI:  Chirag Balderas is a 71 y.o. old patient who comes in today for routine follow up.     Type 2 diabetes: He is currently on NPH 40 units at bedtime and regular insulin 25 units with each meal. Also on metformin 1000 mg twice a day. Reports compliance with medications. He checks blood sugars 4 times a day. Fasting blood sugar in the morning has been in the range of 180-240. Pre-meal blood sugar readings are high as well. No recent hypoglycemia. He is oppositional exam.    Hypertension: Blood pressure is well controlled. He reports compliance with medications. He is on ARB.    Hyperlipidemia: LDL 99. He is on Lipitor, tolerating well. He has history of coronary artery disease.    ROS:  Constitutional: No unintentional weight loss  Respiratory: Positive for dyspnea on exertion    Past Medical History:  Patient Active Problem List    Diagnosis Date Noted   • Presence of biventricular AICD 05/02/2016     Priority: High   • Persistent atrial fibrillation (CMS-HCC) 03/04/2016     Priority: High   • Atypical atrial flutter (CMS-HCC) 03/04/2016     Priority: High   • Anticoagulant long-term use 03/04/2016     Priority: High   • Ischemic cardiomyopathy 05/14/2013     Priority: High   • Hypertriglyceridemia 05/14/2013     Priority: High   • Coronary artery disease 06/05/2012     Priority: High   • HTN (hypertension) 06/05/2012     Priority: High   • Hyperlipidemia 06/05/2012     Priority: High   • Systolic CHF (CMS-HCC) 09/05/2014     Priority: Medium   • Sleep apnea 05/16/2013     Priority: Medium   • Type 2 diabetes mellitus without complication (CMS-HCC) 06/05/2012     Priority: Medium   • Hypothyroid 09/03/2014     Priority: Low   • Depression 02/21/2014     Priority: Low   • Chronic obstructive pulmonary disease with acute exacerbation (CMS-HCC) 08/29/2017   • Chronic paroxysmal hemicrania, not intractable 07/09/2015   •  "Pulmonary nodule 09/03/2014   • ED (erectile dysfunction) 08/20/2013   • Prostate cancer screening 06/25/2013   • Colon cancer screening 06/25/2013       Medications:    Current Outpatient Prescriptions:   •  Budesonide-Formoterol Fumarate (SYMBICORT INH), Inhale  by mouth., Disp: , Rfl:   •  insulin NPH (HUMULIN,NOVOLIN) 100 UNIT/ML Suspension, Inject 40 Units as instructed every bedtime., Disp: , Rfl:   •  carvedilol (COREG) 3.125 MG Tab, Take 1 Tab by mouth 2 times a day, with meals., Disp: 180 Tab, Rfl: 3  •  furosemide (LASIX) 40 MG Tab, TAKE ONE TABLET BY MOUTH ONCE DAILY AS NEEDED, Disp: 30 Tab, Rfl: 6  •  insulin regular human (HUMULIN/NOVOLIN R), Inject 25 Units as instructed 3 times a day before meals., Disp: , Rfl:   •  lisinopril (PRINIVIL) 20 MG Tab, Take 1 Tab by mouth every day., Disp: 90 Tab, Rfl: 3  •  metformin (GLUCOPHAGE) 1000 MG tablet, take 1 tablet by mouth twice a day WITH A MEAL, Disp: 180 Tab, Rfl: 3  •  atorvastatin (LIPITOR) 80 MG tablet, Take 1 Tab by mouth every day., Disp: 90 Tab, Rfl: 3  •  warfarin (COUMADIN) 5 MG Tab, Take one-half to one tablet by mouth one time daily as directed by anticoagulation clinic (Patient taking differently: 2.5-5 mg. Take one-half to one tablet by mouth one time daily as directed by anticoagulation clinic), Disp: 90 Tab, Rfl: 1  •  levothyroxine (SYNTHROID) 100 MCG Tab, Take 1 Tab by mouth Every morning on an empty stomach., Disp: 90 Tab, Rfl: 3  •  glucose blood (ONE TOUCH ULTRA TEST) strip, Use to test blood sugar levels 5 times daily as directed by physician. ICD-10: E11.9, Z79.4, Disp: 500 Strip, Rfl: 5  •  Blood Glucose Monitoring Suppl SUPPLIES [facility], Freestyle Freedome Lite test strips. Use to test blood sugars 4-5 times daily as directed. Dx: ICD-10. E11.9, Z74.9, Disp: 500 Strip, Rfl: 3  •  Insulin Syringe-Needle U-100 (INSULIN SYRINGE .3CC/31GX5/16\") 31G X 5/16\" 0.3 ML Misc, For insulin shots 5 times a day, Disp: 200 Each, Rfl: 6    Physical " Examination:  Vital signs: /68   Pulse 83   Ht 1.829 m (6')   Wt 107.8 kg (237 lb 9.6 oz)   SpO2 88%   BMI 32.22 kg/m²   General: No apparent distress, cooperative  Eyes: No scleral icterus, no discharge   Psych: Alert and oriented, normal mood and affect    Assessment and Plan:    Type 2 diabetes mellitus with hyperglycemia, with long-term current use of insulin (HCC)  · Hemoglobin A1c today in the clinic is 9.4%  · Goal hemoglobin A1c less than 7-7.5%  · Increased NPH to 44 units at bedtime and we discussed treat to target recommendations   · Increased regular insulin to 28 units with each meal  · Continue metformin 1000 mg twice a day, GFR 50  · Advised to continue checking blood sugars 4 times a day    Essential hypertension  · Blood pressure is well controlled   · Continue ARB, in addition to other antihypertensive agents     Mixed hyperlipidemia  · Continue Lipitor  · LDL 99    Acquired hypothyroidism  · Repeat labs for TSH/free T4  · Continue levothyroxine 100 µg daily    Return in about 3 months (around 12/8/2017).    Thank you for allowing me to participate in the care of this patient.    Indira Herrera M.D.    CC:   Thomas Zhang M.D.    This note was created using voice recognition software (Dragon). The accuracy of the dictation is limited by the abilities of the software. I have reviewed the note prior to signing, however some errors in grammar and context are still possible. If you have any questions related to this note please do not hesitate to contact our office.

## 2017-09-08 NOTE — PROGRESS NOTES
Patient with existing type diabetes:  Type 2 diabetes uncontrolled here for follow up.      Patient's health status since last visit: hospitalized for pneumonia at [facility] about 2 weeks ago.    Issues with diabetes since last visit blood sugars running higher.  States were not giving him enough insulin when in hospital.   Current Diabetes Medications: NPH 40 units at hs and Regular u-100 insulin 25 units with meals.     HbA1c: @hba1c@  Lab Results   Component Value Date/Time    HBA1C 9.4 09/08/2017 10:11 AM        FSBS  Testing: testing 4 times per day  Fasting in tlhe 190-270 range most of the time  Ac lunch 130-260 range  Ac dinner 150-250 range.   Hs 180-240 range.     Hypoglycemia: states he has had a couple of low blood sugars.  Exercise: no    Retinal Exam:current.     Daily Foot Exam: checks denies problems.     Flu vaccine: due  Pneumonia vaccine current.

## 2017-09-10 DIAGNOSIS — E11.9 TYPE 2 DIABETES MELLITUS WITHOUT COMPLICATION, WITH LONG-TERM CURRENT USE OF INSULIN (HCC): ICD-10-CM

## 2017-09-10 DIAGNOSIS — Z79.4 TYPE 2 DIABETES MELLITUS WITHOUT COMPLICATION, WITH LONG-TERM CURRENT USE OF INSULIN (HCC): ICD-10-CM

## 2017-09-11 RX ORDER — INSULIN HUMAN 100 [IU]/ML
INJECTION, SOLUTION PARENTERAL
Qty: 20 ML | Refills: 6 | Status: SHIPPED | OUTPATIENT
Start: 2017-09-11 | End: 2017-09-12

## 2017-09-12 ENCOUNTER — TELEPHONE (OUTPATIENT)
Dept: ENDOCRINOLOGY | Facility: MEDICAL CENTER | Age: 72
End: 2017-09-12

## 2017-09-12 DIAGNOSIS — E11.65 TYPE 2 DIABETES MELLITUS WITH HYPERGLYCEMIA, WITH LONG-TERM CURRENT USE OF INSULIN (HCC): ICD-10-CM

## 2017-09-12 DIAGNOSIS — Z79.4 TYPE 2 DIABETES MELLITUS WITH HYPERGLYCEMIA, WITH LONG-TERM CURRENT USE OF INSULIN (HCC): ICD-10-CM

## 2017-09-12 NOTE — TELEPHONE ENCOUNTER
Kindly change Rx from Humulin R to Novolin R due to price issues.    Rx will be send to [facility]    Thank you  Aga

## 2017-09-16 LAB — INR PPP: 3.4 (ref 2–3.5)

## 2017-09-18 ENCOUNTER — ANTICOAGULATION MONITORING (OUTPATIENT)
Dept: VASCULAR LAB | Facility: MEDICAL CENTER | Age: 72
End: 2017-09-18

## 2017-09-19 NOTE — PROGRESS NOTES
OP Telephone Anticoagulation Service Note    Date: 9/18/2017      Anticoagulation Summary  As of 9/18/2017    INR goal:   2.0-3.0   TTR:   41.6 % (1.5 y)   Today's INR:   3.4! (9/16/2017)   Maintenance plan:   5 mg (5 mg x 1) on Mon, Fri; 2.5 mg (5 mg x 0.5) all other days   Weekly total:   22.5 mg   Plan last modified:   Rafaela Leon PharmD (9/18/2017)   Next INR check:   9/29/2017   Target end date:   Indefinite         Anticoagulation Episode Summary     INR check location:   Outside Lab    Preferred lab:       Send INR reminders to:       Comments:         Anticoagulation Care Providers     Provider Role Specialty Phone number    Zain Winslow M.D. Referring Cardiac Electrophysiology [phone]    Anna LimaD Responsible          Anticoagulation Patient Findings  Patient Findings     Negatives:   Signs/symptoms of thrombosis, Signs/symptoms of bleeding, Laboratory test error suspected, Change in health, Change in alcohol use, Change in activity, Upcoming invasive procedure, Emergency department visit, Upcoming dental procedure, Missed doses, Extra doses, Change in medications, Change in diet/appetite, Hospital admission, Bruising, Other complaints            Plan: INR is high today. Spoke with pt on the phone. Confirmed dosing regimen. No missed or extra doses taken. Patient denies sign/symptoms of bleeding/clotting. No recent medication changes and patient has been eating a consistent diet. Instructed pt to call clinic with any concerns of bleeding or thrombosis. Instructed pt to take 2.5 mg tonight then begin decreased weekly regimen.  Follow up in 2 week(s)        Rafaela Leon, PharmD

## 2017-10-02 ENCOUNTER — ANTICOAGULATION MONITORING (OUTPATIENT)
Dept: VASCULAR LAB | Facility: MEDICAL CENTER | Age: 72
End: 2017-10-02

## 2017-10-02 LAB — INR PPP: 3 (ref 2–3.5)

## 2017-10-02 NOTE — PROGRESS NOTES
OP Anticoagulation Telephone Note    Date: 10/2/2017  Anticoagulation Summary  As of 10/2/2017    INR goal:   2.0-3.0   TTR:   40.5 % (1.5 y)   Today's INR:   3.0 (10/1/2017)   Maintenance plan:   5 mg (5 mg x 1) on Mon, Fri; 2.5 mg (5 mg x 0.5) all other days   Weekly total:   22.5 mg   No change documented:   Viktoria Chaidez, Med Ass't   Plan last modified:   Rafaela Leon PharmD (9/18/2017)   Next INR check:   10/16/2017   Target end date:   Indefinite         Anticoagulation Episode Summary     INR check location:   Outside Lab    Preferred lab:       Send INR reminders to:       Comments:         Anticoagulation Care Providers     Provider Role Specialty Phone number    Zain Winslow M.D. Referring Cardiac Electrophysiology [phone]    Cherelle Nixon PharmD Responsible          Anticoagulation Patient Findings  Patient Findings     Negatives:   Signs/symptoms of thrombosis, Signs/symptoms of bleeding, Laboratory test error suspected, Change in health, Change in alcohol use, Change in activity, Upcoming invasive procedure, Emergency department visit, Upcoming dental procedure, Missed doses, Extra doses, Change in medications, Change in diet/appetite, Hospital admission, Bruising, Other complaints      Plan:  Left message on patient's answering machine/ voicemail. Instructed patient to call back with any concerns regarding any unusual bleeding or bruising, any medication or diet changes, or any signs or symptoms of thrombosis. Instructed patient to resume medication as outlined above. Patient to follow up in 2 weeks.     Viktoria Chaidez, Medical Assistant    I have reviewed and agree with the plan above on 10/03/2017      Cherelle Nixon PharmD

## 2017-10-16 ENCOUNTER — ANTICOAGULATION MONITORING (OUTPATIENT)
Dept: VASCULAR LAB | Facility: MEDICAL CENTER | Age: 72
End: 2017-10-16

## 2017-10-16 LAB — INR PPP: 3.5 (ref 2–3.5)

## 2017-10-16 NOTE — PROGRESS NOTES
Anticoagulation Summary  As of 10/16/2017    INR goal:   2.0-3.0   TTR:   39.5 % (1.6 y)   Today's INR:   3.5!   Maintenance plan:   5 mg (5 mg x 1) on Sun, Tue, Thu; 2.5 mg (5 mg x 0.5) all other days   Weekly total:   25 mg   Plan last modified:   Alley Alcala PharmD (10/16/2017)   Next INR check:   10/30/2017   Target end date:   Indefinite         Anticoagulation Episode Summary     INR check location:   Outside Lab    Preferred lab:       Send INR reminders to:       Comments:         Anticoagulation Care Providers     Provider Role Specialty Phone number    Zain Winslow M.D. Referring Cardiac Electrophysiology [phone]    Anna LimaD Responsible          Anticoagulation Patient Findings    INR is SUPRA therapeutic.   Pt was taking a higher weekly dose.   Will have pt hold his warfarin dosing today.  Pt denies any unusual s/s of bleeding, bruising, clotting or any changes to diet or medications. Denies any etoh, cranberries, supplements, or illness.   Pt verifies warfarin weekly dosing.     Will have pt start a 11% reduced weekly dose.     Repeat INR in 2 weeks.     Alley Alcala, AnnaD

## 2017-10-30 ENCOUNTER — ANTICOAGULATION MONITORING (OUTPATIENT)
Dept: VASCULAR LAB | Facility: MEDICAL CENTER | Age: 72
End: 2017-10-30

## 2017-10-30 LAB — INR PPP: 3.3 (ref 2–3.5)

## 2017-10-30 NOTE — PROGRESS NOTES
OP Telephone Anticoagulation Service Note    Date: 10/30/2017      Anticoagulation Summary  As of 10/30/2017    INR goal:   2.0-3.0   TTR:   38.6 % (1.6 y)   Today's INR:   3.3!   Maintenance plan:   5 mg (5 mg x 1) on Sun, Thu; 2.5 mg (5 mg x 0.5) all other days   Weekly total:   22.5 mg   Plan last modified:   Rafaela Leon PharmD (10/30/2017)   Next INR check:      Target end date:   Indefinite         Anticoagulation Episode Summary     INR check location:   Outside Lab    Preferred lab:       Send INR reminders to:       Comments:         Anticoagulation Care Providers     Provider Role Specialty Phone number    Zain Winslow M.D. Referring Cardiac Electrophysiology [phone]    Cherelle Nixon PharmD Responsible          Anticoagulation Patient Findings  Patient Findings     Negatives:   Signs/symptoms of thrombosis, Signs/symptoms of bleeding, Laboratory test error suspected, Change in health, Change in alcohol use, Change in activity, Upcoming invasive procedure, Emergency department visit, Upcoming dental procedure, Missed doses, Extra doses, Change in medications, Change in diet/appetite, Hospital admission, Bruising, Other complaints            Plan: Spoke with patient on the phone. Patient is supratherapeutic today. Confirmed dosing. No missed tablets in the last week. Patient denies any changes in medications or diet. Patient denies any signs or symptoms of bleeding or clotting. Instructed patient to call clinic if any unusual bleeding or bruising occurs. Will have pt begin decreased weekly regimen as outlined. Will follow-up with patient in 2 week(s).      Rafaela Leon PharmD

## 2017-11-13 LAB — INR PPP: 2.7 (ref 2–3.5)

## 2017-11-14 ENCOUNTER — ANTICOAGULATION MONITORING (OUTPATIENT)
Dept: VASCULAR LAB | Facility: MEDICAL CENTER | Age: 72
End: 2017-11-14

## 2017-11-14 NOTE — PROGRESS NOTES
Anticoagulation Summary  As of 11/14/2017    INR goal:   2.0-3.0   TTR:   38.8 % (1.7 y)   Today's INR:   2.7 (11/13/2017)   Maintenance plan:   5 mg (5 mg x 1) on Sun, Thu; 2.5 mg (5 mg x 0.5) all other days   Weekly total:   22.5 mg   Plan last modified:   Rafaela Leon PharmD (10/30/2017)   Next INR check:   11/27/2017   Target end date:   Indefinite         Anticoagulation Episode Summary     INR check location:   Outside Lab    Preferred lab:       Send INR reminders to:       Comments:         Anticoagulation Care Providers     Provider Role Specialty Phone number    Zain Winslow M.D. Referring Cardiac Electrophysiology [phone]    Anna LimaD Responsible          Anticoagulation Patient Findings    Spoke with Chirag to report a therapeutic INR of 2.7. Pt is to continue with current warfarin dosing regimen.  Pt denies any unusual s/s of bleeding, bruising, clotting or any changes to diet or medications.    Follow up in 2 weeks.    Anna LimaD

## 2017-11-15 ENCOUNTER — NON-PROVIDER VISIT (OUTPATIENT)
Dept: CARDIOLOGY | Facility: PHYSICIAN GROUP | Age: 72
End: 2017-11-15
Payer: MEDICARE

## 2017-11-15 VITALS
SYSTOLIC BLOOD PRESSURE: 120 MMHG | HEART RATE: 70 BPM | DIASTOLIC BLOOD PRESSURE: 58 MMHG | HEIGHT: 72 IN | BODY MASS INDEX: 31.69 KG/M2 | WEIGHT: 234 LBS | OXYGEN SATURATION: 90 %

## 2017-11-15 DIAGNOSIS — I25.5 ISCHEMIC CARDIOMYOPATHY: ICD-10-CM

## 2017-11-15 DIAGNOSIS — Z79.01 ANTICOAGULANT LONG-TERM USE: ICD-10-CM

## 2017-11-15 DIAGNOSIS — Z95.810 PRESENCE OF BIVENTRICULAR AICD: ICD-10-CM

## 2017-11-15 DIAGNOSIS — I25.10 CORONARY ARTERY DISEASE DUE TO LIPID RICH PLAQUE: ICD-10-CM

## 2017-11-15 DIAGNOSIS — I48.4 ATYPICAL ATRIAL FLUTTER (HCC): ICD-10-CM

## 2017-11-15 DIAGNOSIS — I25.83 CORONARY ARTERY DISEASE DUE TO LIPID RICH PLAQUE: ICD-10-CM

## 2017-11-15 DIAGNOSIS — I48.19 PERSISTENT ATRIAL FIBRILLATION (HCC): ICD-10-CM

## 2017-11-15 PROCEDURE — 93289 INTERROG DEVICE EVAL HEART: CPT | Performed by: NURSE PRACTITIONER

## 2017-11-15 NOTE — PROGRESS NOTES
Device is working normally.  No device therapy.  Mode switching episodes 100% of the time (known); underlying rhythm is persistent atrial fibrillation/flutter; he is anticoagulated with Coumadin.  Normal sensing of RA, RV and LV leads; stable capture of RV and LV leads; stable impedances. Battery charge time is 9.6 seconds; battery longevity is 5.3 years.  No changes are made today.    FU in 6 months for next AICD check with me. He does see Dr. Catalan later this month.    Collaborating MD: Brandie

## 2017-11-27 LAB — INR PPP: 1.6 (ref 2–3.5)

## 2017-11-27 NOTE — PROGRESS NOTES
OP Anticoagulation Telephone Note    Date: 11/27/2017  Physician Name: Goldy  Duration of Therapy : Indefinite  Reason for Anticoagulation: Atrial Fibrillation  Previous Regimen (mg / week): 25  New Regimen (mg / week): 25  Signs & Symptoms of Anticoagulation: None  Compliance: Missed Doses since Last Appointment  Significant Interactions: Statin, Thyroid Medications  Assessment: Subtherapeutic  Target INR: 2.0 to 3.0  Comments: INR 1.6 Jessica JAIN    Plan:      Talked to patient on the phone. Patient is taking more warfarin than directed. Patient reports taking 5mg on MWF and 2.5mg on all other days of the week. Denies any missing doses. No changes in medications. Consistent dietary intake. No overt s/s of bleeding, bruising. INR is subtherapeutic today. Bolus 10 mg today x 1 dose then resume current weekly dose. Re-check INR in 7 days. Plan discussed with Alley.     Medication: Warfarin (Coumadin)     Sunday Monday Tuesday Wednesday Thursday Friday Saturday      2.5 mg    5 mg    2.5 mg    5 mg    2.5 mg    5 mg    2.5 mg      0.5 tab(s)    1 tab(s)    0.5 tab(s)    1 tab(s)    0.5 tab(s)    1 tab(s)  0.5 tab(s)     Next Appointment: Monday, 12/04/2017     Agnes Silva, Pharmacy Intern   I have reviewed and agree with the plan above on  11/28/2017    Rafaela Leon, Pharm D

## 2017-11-28 ENCOUNTER — ANTICOAGULATION MONITORING (OUTPATIENT)
Dept: VASCULAR LAB | Facility: MEDICAL CENTER | Age: 72
End: 2017-11-28

## 2017-11-28 NOTE — PROGRESS NOTES
OP Telephone Anticoagulation Service Note    Date: 11/28/2017      Anticoagulation Summary  As of 11/28/2017    INR goal:   2.0-3.0   TTR:   39.4 % (1.7 y)   Today's INR:   1.6! (11/27/2017)   Maintenance plan:   5 mg (5 mg x 1) on Mon, Wed, Fri; 2.5 mg (5 mg x 0.5) all other days   Weekly total:   25 mg   Plan last modified:   Rafaela Leon PharmD (11/28/2017)   Next INR check:   12/4/2017   Target end date:   Indefinite         Anticoagulation Episode Summary     INR check location:   Outside Lab    Preferred lab:       Send INR reminders to:       Comments:         Anticoagulation Care Providers     Provider Role Specialty Phone number    Zain Winslow M.D. Referring Cardiac Electrophysiology [phone]    Cherelle Nixon PharmD Responsible          Anticoagulation Patient Findings        Plan: INR subtherapeutic. See note from ivelisse aaron on 7-21-17. Pt was taking warfarin differently, 5 mg MWF then 2.5 mg ROW. Pt was instructed to take 10 mg x 1 dose then resume usual regimen. Follow up 1 week.           Rafaela eLon, PharmD

## 2017-12-01 ENCOUNTER — OFFICE VISIT (OUTPATIENT)
Dept: CARDIOLOGY | Facility: CLINIC | Age: 72
End: 2017-12-01
Payer: MEDICARE

## 2017-12-01 ENCOUNTER — ANTICOAGULATION MONITORING (OUTPATIENT)
Dept: VASCULAR LAB | Facility: MEDICAL CENTER | Age: 72
End: 2017-12-01

## 2017-12-01 VITALS
OXYGEN SATURATION: 90 % | SYSTOLIC BLOOD PRESSURE: 140 MMHG | HEIGHT: 72 IN | WEIGHT: 239 LBS | DIASTOLIC BLOOD PRESSURE: 70 MMHG | HEART RATE: 70 BPM | BODY MASS INDEX: 32.37 KG/M2

## 2017-12-01 DIAGNOSIS — Z95.810 BIVENTRICULAR AUTOMATIC IMPLANTABLE CARDIOVERTER DEFIBRILLATOR IN SITU: ICD-10-CM

## 2017-12-01 DIAGNOSIS — I10 ESSENTIAL HYPERTENSION: ICD-10-CM

## 2017-12-01 DIAGNOSIS — E11.9 TYPE 2 DIABETES MELLITUS WITHOUT COMPLICATION, UNSPECIFIED LONG TERM INSULIN USE STATUS: ICD-10-CM

## 2017-12-01 DIAGNOSIS — I25.10 CORONARY ARTERY DISEASE DUE TO LIPID RICH PLAQUE: ICD-10-CM

## 2017-12-01 DIAGNOSIS — G47.33 OSA ON CPAP: ICD-10-CM

## 2017-12-01 DIAGNOSIS — Z79.01 ANTICOAGULANT LONG-TERM USE: ICD-10-CM

## 2017-12-01 DIAGNOSIS — I25.5 ISCHEMIC CARDIOMYOPATHY: ICD-10-CM

## 2017-12-01 DIAGNOSIS — E78.2 MIXED HYPERLIPIDEMIA: ICD-10-CM

## 2017-12-01 DIAGNOSIS — I25.83 CORONARY ARTERY DISEASE DUE TO LIPID RICH PLAQUE: ICD-10-CM

## 2017-12-01 LAB — INR PPP: 4.7 (ref 2–3.5)

## 2017-12-01 PROCEDURE — 99214 OFFICE O/P EST MOD 30 MIN: CPT | Performed by: INTERNAL MEDICINE

## 2017-12-01 NOTE — LETTER
[facility] [address],   [address]  [place] [address]  Phone: [phone]  Fax: [phone]              Chirag Balderas  1945    Encounter Date: 12/1/2017    Thomas Zhang M.D.    Thank you for the referral. I had the pleasure of seeing Chirag Balderas today in cardiology clinic. I've attached my visit note below. If you have any questions please feel free to give me a call anytime.      Monika Catalan MD, PhD, [facility]  Cardiology and Lipidology  [facility]                                                                    PROGRESS NOTE:  Chief Complaint   Patient presents with   • Follow-Up     asc aorta size; CM; BiV ICD    This patient is an established male who is here today to discuss:  LVEF 45% and asc aorta 3.58 cm -- stable  Uses O2 and CPAP at night      Patient Active Problem List    Diagnosis Date Noted   • Presence of biventricular AICD 05/02/2016     Priority: High   • Persistent atrial fibrillation (CMS-HCC) 03/04/2016     Priority: High   • Atypical atrial flutter (CMS-HCC) 03/04/2016     Priority: High   • Anticoagulant long-term use 03/04/2016     Priority: High   • Ischemic cardiomyopathy 05/14/2013     Priority: High   • Hypertriglyceridemia 05/14/2013     Priority: High   • Coronary artery disease 06/05/2012     Priority: High   • HTN (hypertension) 06/05/2012     Priority: High   • Hyperlipidemia 06/05/2012     Priority: High   • Systolic CHF (CMS-HCC) 09/05/2014     Priority: Medium   • Sleep apnea 05/16/2013     Priority: Medium   • Type 2 diabetes mellitus without complication (CMS-HCC) 06/05/2012     Priority: Medium   • Hypothyroid 09/03/2014     Priority: Low   • Depression 02/21/2014     Priority: Low   • Chronic obstructive pulmonary disease with acute exacerbation (CMS-HCC) 08/29/2017   • Chronic paroxysmal hemicrania, not intractable 07/09/2015   • Pulmonary  nodule 09/03/2014   • ED (erectile dysfunction) 08/20/2013   • Prostate cancer screening 06/25/2013   • Colon cancer screening 06/25/2013       Past Medical History:   Diagnosis Date   • Atrial fibrillation, persistent (CMS-HCC)         • Breath shortness     On oxygen   • Cataract     Bilateral IOL   • Chronic anticoagulation         • COPD (chronic obstructive pulmonary disease) (CMS-[facility])    • Coronary artery disease 2002    History of remote MI/stent   • Dental disorder     Dentures   • Depression    • DIABETES MELLITUS     On insulin   • HTN (hypertension)     • Hyperlipidemia     • Hypothyroidism    • Ischemic cardiomyopathy March 2015    Echocardiogram with LVEF 30-40%. Mild-moderate septal hypertrophy. Moderately enlarged LA.  Moderate-severely enlarged RA. Mild AR. Mild MR. Mild TR, RVSP 5-10mmHg.   • Myocardial infarction 2002   • Sleep apnea     Uses CPAP   • Supplemental oxygen dependent    • Systolic CHF (CMS-[facility])       Past Surgical History:   Procedure Laterality Date   • RECOVERY  3/18/2016    Procedure: CATH LAB BIV ICD INSERT [facility];  Surgeon: Recoveryonomer Surgery;  Location: SURGERY PRE-POST PROC UNIT [facility];  Service:    • AICD IMPLANT  March 2016    [employer] [id number] CRT-D implanted by Dr. Winslow.   • OTHER NEUROLOGICAL SURG  03/2013    Laminectomy lumbar   • CARDIAC CATH  2002   • CATARACT EXTRACTION WITH IOL Right 2000        • CATARACT EXTRACTION WITH IOL Left 1980          Social History     Social History   • Marital status:      Spouse name: N/A   • Number of children: N/A   • Years of education: N/A     Social History Main Topics   • Smoking status: Former Smoker     Packs/day: 0.50     Years: 50.00     Types: Cigarettes     Quit date: 11/1/2012   • Smokeless tobacco: Never Used   • Alcohol use No   • Drug use: No   • Sexual activity: Not on file     Other Topics Concern   • Not on file     Social History Narrative   • No narrative on file     Family History  "  Problem Relation Age of Onset   • Other Mother 84     failure to thrive   • Heart Disease Mother    • Heart Attack Father 72       Current Outpatient Prescriptions   Medication Sig Dispense Refill   • levothyroxine (SYNTHROID) 112 MCG Tab Take 1 Tab by mouth Every morning on an empty stomach. 60 Tab 1   • insulin regular (NOVOLIN R RELION) 100 Unit/mL Solution Inject 25 Units as instructed 3 times a day before meals. 70 mL 3   • Budesonide-Formoterol Fumarate (SYMBICORT INH) Inhale  by mouth.     • insulin NPH (HUMULIN,NOVOLIN) 100 UNIT/ML Suspension Inject 40 Units as instructed every bedtime.     • carvedilol (COREG) 3.125 MG Tab Take 1 Tab by mouth 2 times a day, with meals. 180 Tab 3   • furosemide (LASIX) 40 MG Tab TAKE ONE TABLET BY MOUTH ONCE DAILY AS NEEDED 30 Tab 6   • lisinopril (PRINIVIL) 20 MG Tab Take 1 Tab by mouth every day. 90 Tab 3   • metformin (GLUCOPHAGE) 1000 MG tablet take 1 tablet by mouth twice a day WITH A MEAL 180 Tab 3   • atorvastatin (LIPITOR) 80 MG tablet Take 1 Tab by mouth every day. 90 Tab 3   • warfarin (COUMADIN) 5 MG Tab Take one-half to one tablet by mouth one time daily as directed by anticoagulation clinic (Patient taking differently: 2.5-5 mg. Take one-half to one tablet by mouth one time daily as directed by anticoagulation clinic) 90 Tab 1   • Blood Glucose Monitoring Suppl SUPPLIES Misc Freestyle Freedome Lite test strips. Use to test blood sugars 4-5 times daily as directed. Dx: ICD-10. E11.9, Z74.9 500 Strip 3   • Insulin Syringe-Needle U-100 (INSULIN SYRINGE .3CC/31GX5/16\") 31G X 5/16\" 0.3 ML Misc For insulin shots 5 times a day 200 Each 6   • glucose blood (ONE TOUCH ULTRA TEST) strip Use to test blood sugar levels 5 times daily as directed by physician. ICD-10: E11.9, Z79.4 500 Strip 5     No current facility-administered medications for this visit.      Pcn [penicillins]    Review of Systems:     Constitutional: Denies fevers, Denies weight changes  Eyes: Denies " changes in vision, no eye pain  Ears/Nose/Throat/Mouth: Denies nasal congestion or sore throat   Cardiovascular: Denies chest pain or palpitations   Respiratory: Denies shortness of breath , Denies cough  Gastrointestinal/Hepatic: Denies abdominal pain, nausea, vomiting, diarrhea, constipation or GI bleeding   Genitourinary: Denies bladder dysfunction, dysuria or frequency  Musculoskeletal/Rheum: Denies  joint pain and swelling   Skin/Breast: Denies rash, denies breast lumps or discharge  Neurological: Denies headache, confusion, memory loss or focal weakness/parasthesias  Psychiatric: denies mood disorder   Endocrine: denies hx of diabetes or thyroid dysfunction  Heme/Oncology/Lymph Nodes: Denies enlarged lymph nodes, denies brusing or known bleeding disorder  Allergic/Immunologic: Denies hx of allergies      All other systems were reviewed and are negative (AMA/CMS criteria)      Blood pressure 140/70, pulse 70, height 1.829 m (6'), weight 108.4 kg (239 lb), SpO2 90 %.  General Appearance:  Obese;  Well developed, Well nourished, No acute distress, Non-toxic appearance. O2 NC suppl  HENT:  Normocephalic, Atraumatic, Oropharynx moist mucous membranes, Dentition: , Nose normal.    Eyes:  PERRLA, EOMI, Conjunctiva normal, No discharge.  Neck:  Normal range of motion, No cervical tenderness, Supple, No stridor, no JVD .  No thyromegaly.  No carotid bruit.  Cardiovascular:  Normal heart rate, Normal rhythm,  S1, S2, no S3, S4; No gallops; No murmurs, No rubs, .   Extremitites with intact distal pulses, no cyanosis, clubbing or edema.  No heaves, thrills, HJR;  Peripheral pulses: carotid 2+, brachial 2+, radial 2+, ulnar 2+, femoral 2+, popliteal 2+, PT 2+, DP 2+;  Lungs:  Respiratory effort is normal. Normal breath sounds, breath sounds clear to auscultation bilaterally,  no rales, no rhonchi, no wheezing.   Abdomen: Bowel sounds normal, Soft, No tenderness, No guarding, No rebound, No masses, No  hepatosplenomegaly.  Skin: Warm, Dry, No erythema, No rash, no induration or crepitus.  Neurologic: Alert & oriented x 3, Normal motor function, Normal sensory function, No focal deficits noted, cranial nerves II through XII are normal,  normal gait.  Psychiatric: Affect normal, Judgment normal, Mood normal.    Results for ELY SOW (MRN [id number]) as of 12/1/2017 10:37   Ref. Range 9/8/2017 10:11 9/11/2017 09:30 9/16/2017 00:00 10/1/2017 00:00 10/16/2017 00:00 10/21/2017 09:07 10/30/2017 00:00 11/13/2017 00:00   Sodium Latest Ref Range: 136 - 145 mmol/L      141     Potassium Latest Ref Range: 3.5 - 5.1 mmol/L      4.8     Chloride Latest Ref Range: 98 - 107 mmol/L      106     Co2 Latest Ref Range: 21 - 32 mmol/L      26     Anion Gap Latest Ref Range: 10 - 18 mmol/L      14     Glucose Latest Ref Range: 74 - 99 mg/dL      255 (H)     Bun Latest Ref Range: 7 - 18 mg/dL      18     Creatinine Latest Ref Range: 0.8 - 1.3 mg/dL      1.1     GFR If  Latest Ref Range: >60 mL/min/1.73 m 2      >60     GFR If Non  Latest Ref Range: >60 mL/min/1.73 m 2      >60     Calcium Latest Ref Range: 8.5 - 11.0 mg/dL      9.1     AST(SGOT) Latest Ref Range: 15 - 37 U/L      31     ALT(SGPT) Latest Ref Range: 12 - 78 U/L      27     Alkaline Phosphatase Latest Ref Range: 46 - 116 U/L      52     Total Bilirubin Latest Ref Range: 0.2 - 1.0 mg/dL      1.4 (H)     Albumin Latest Ref Range: 3.4 - 5.0 g/dL      3.5     Total Protein Latest Ref Range: 6.4 - 8.2 g/dL      7.6     A-G Ratio Unknown      0.9     Glycohemoglobin Unknown 9.4          INR Unknown   3.4 3.0 3.5  3.3 2.7   Results for ELY SOW (MRN [id number]) as of 12/1/2017 10:37   Ref. Range 9/8/2017 10:11 9/11/2017 09:30   TSH Latest Ref Range: 0.36 - 3.74 uIU/mL  5.44 (H)   Free T-4 Latest Ref Range: 0.76 - 1.46 ng/dL  1.16     Assessment and Plan.   72 y.o. male has high CV risk and labile Blood sugar control; Discussed  his lab and goals;     1. Coronary artery disease due to lipid rich plaque  asx    2. Ischemic cardiomyopathy  asx    3. Biventricular automatic implantable cardioverter defibrillator in situ  Stable; fxn fine    4. Essential hypertension  controlled    5. Anticoagulant long-term use  Warfarin    6. GAVINO on CPAP  continue    7. Type 2 diabetes mellitus without complication, unspecified long term insulin use status (CMS-[facility])  PCP and may need more fibers;     8. Mixed hyperlipidemia  recheck      Return to clinic in  3, months    1. Coronary artery disease due to lipid rich plaque     2. Ischemic cardiomyopathy     3. Biventricular automatic implantable cardioverter defibrillator in situ     4. Essential hypertension     5. Anticoagulant long-term use     6. GAVINO on CPAP     7. Type 2 diabetes mellitus without complication, unspecified long term insulin use status (CMS-[facility])     8. Mixed hyperlipidemia           Thomas Zhang M.D.  [address]  [facility] [address]  VIA In Basket

## 2017-12-01 NOTE — PROGRESS NOTES
Chief Complaint   Patient presents with   • Follow-Up     asc aorta size; CM; BiV ICD    This patient is an established male who is here today to discuss:  LVEF 45% and asc aorta 3.58 cm -- stable  Uses O2 and CPAP at night      Patient Active Problem List    Diagnosis Date Noted   • Presence of biventricular AICD 05/02/2016     Priority: High   • Persistent atrial fibrillation (CMS-HCC) 03/04/2016     Priority: High   • Atypical atrial flutter (CMS-HCC) 03/04/2016     Priority: High   • Anticoagulant long-term use 03/04/2016     Priority: High   • Ischemic cardiomyopathy 05/14/2013     Priority: High   • Hypertriglyceridemia 05/14/2013     Priority: High   • Coronary artery disease 06/05/2012     Priority: High   • HTN (hypertension) 06/05/2012     Priority: High   • Hyperlipidemia 06/05/2012     Priority: High   • Systolic CHF (CMS-HCC) 09/05/2014     Priority: Medium   • Sleep apnea 05/16/2013     Priority: Medium   • Type 2 diabetes mellitus without complication (CMS-HCC) 06/05/2012     Priority: Medium   • Hypothyroid 09/03/2014     Priority: Low   • Depression 02/21/2014     Priority: Low   • Chronic obstructive pulmonary disease with acute exacerbation (CMS-HCC) 08/29/2017   • Chronic paroxysmal hemicrania, not intractable 07/09/2015   • Pulmonary nodule 09/03/2014   • ED (erectile dysfunction) 08/20/2013   • Prostate cancer screening 06/25/2013   • Colon cancer screening 06/25/2013       Past Medical History:   Diagnosis Date   • Atrial fibrillation, persistent (CMS-HCC)         • Breath shortness     On oxygen   • Cataract     Bilateral IOL   • Chronic anticoagulation         • COPD (chronic obstructive pulmonary disease) (CMS-HCC)    • Coronary artery disease 2002    History of remote MI/stent   • Dental disorder     Dentures   • Depression    • DIABETES MELLITUS     On insulin   • HTN (hypertension)     • Hyperlipidemia     • Hypothyroidism    • Ischemic cardiomyopathy March 2015    Echocardiogram with  LVEF 30-40%. Mild-moderate septal hypertrophy. Moderately enlarged LA.  Moderate-severely enlarged RA. Mild AR. Mild MR. Mild TR, RVSP 5-10mmHg.   • Myocardial infarction 2002   • Sleep apnea     Uses CPAP   • Supplemental oxygen dependent    • Systolic CHF (CMS-HCC)       Past Surgical History:   Procedure Laterality Date   • RECOVERY  3/18/2016    Procedure: CATH LAB BIV ICD INSERT [facility];  Surgeon: Recoveryonomer Surgery;  Location: SURGERY PRE-POST PROC UNIT [facility] [place];  Service:    • AICD IMPLANT  March 2016    [employer] [id number] CRT-D implanted by Dr. Winslow.   • OTHER NEUROLOGICAL SURG  03/2013    Laminectomy lumbar   • CARDIAC CATH  2002   • CATARACT EXTRACTION WITH IOL Right 2000        • CATARACT EXTRACTION WITH IOL Left 1980          Social History     Social History   • Marital status:      Spouse name: N/A   • Number of children: N/A   • Years of education: N/A     Social History Main Topics   • Smoking status: Former Smoker     Packs/day: 0.50     Years: 50.00     Types: Cigarettes     Quit date: 11/1/2012   • Smokeless tobacco: Never Used   • Alcohol use No   • Drug use: No   • Sexual activity: Not on file     Other Topics Concern   • Not on file     Social History Narrative   • No narrative on file     Family History   Problem Relation Age of Onset   • Other Mother 84     failure to thrive   • Heart Disease Mother    • Heart Attack Father 72       Current Outpatient Prescriptions   Medication Sig Dispense Refill   • levothyroxine (SYNTHROID) 112 MCG Tab Take 1 Tab by mouth Every morning on an empty stomach. 60 Tab 1   • insulin regular (NOVOLIN R RELION) 100 Unit/mL Solution Inject 25 Units as instructed 3 times a day before meals. 70 mL 3   • Budesonide-Formoterol Fumarate (SYMBICORT INH) Inhale  by mouth.     • insulin NPH (HUMULIN,NOVOLIN) 100 UNIT/ML Suspension Inject 40 Units as instructed every bedtime.     • carvedilol (COREG) 3.125 MG Tab Take 1 Tab by mouth 2 times a  "day, with meals. 180 Tab 3   • furosemide (LASIX) 40 MG Tab TAKE ONE TABLET BY MOUTH ONCE DAILY AS NEEDED 30 Tab 6   • lisinopril (PRINIVIL) 20 MG Tab Take 1 Tab by mouth every day. 90 Tab 3   • metformin (GLUCOPHAGE) 1000 MG tablet take 1 tablet by mouth twice a day WITH A MEAL 180 Tab 3   • atorvastatin (LIPITOR) 80 MG tablet Take 1 Tab by mouth every day. 90 Tab 3   • warfarin (COUMADIN) 5 MG Tab Take one-half to one tablet by mouth one time daily as directed by anticoagulation clinic (Patient taking differently: 2.5-5 mg. Take one-half to one tablet by mouth one time daily as directed by anticoagulation clinic) 90 Tab 1   • Blood Glucose Monitoring Suppl SUPPLIES [facility] [place] Freestyle Freedome Lite test strips. Use to test blood sugars 4-5 times daily as directed. Dx: ICD-10. E11.9, Z74.9 500 Strip 3   • Insulin Syringe-Needle U-100 (INSULIN SYRINGE .3CC/31GX5/16\") 31G X 5/16\" 0.3 ML Misc For insulin shots 5 times a day 200 Each 6   • glucose blood (ONE TOUCH ULTRA TEST) strip Use to test blood sugar levels 5 times daily as directed by physician. ICD-10: E11.9, Z79.4 500 Strip 5     No current facility-administered medications for this visit.      Pcn [penicillins]    Review of Systems:     Constitutional: Denies fevers, Denies weight changes  Eyes: Denies changes in vision, no eye pain  Ears/Nose/Throat/Mouth: Denies nasal congestion or sore throat   Cardiovascular: Denies chest pain or palpitations   Respiratory: Denies shortness of breath , Denies cough  Gastrointestinal/Hepatic: Denies abdominal pain, nausea, vomiting, diarrhea, constipation or GI bleeding   Genitourinary: Denies bladder dysfunction, dysuria or frequency  Musculoskeletal/Rheum: Denies  joint pain and swelling   Skin/Breast: Denies rash, denies breast lumps or discharge  Neurological: Denies headache, confusion, memory loss or focal weakness/parasthesias  Psychiatric: denies mood disorder   Endocrine: denies hx of diabetes or thyroid " dysfunction  Heme/Oncology/Lymph Nodes: Denies enlarged lymph nodes, denies brusing or known bleeding disorder  Allergic/Immunologic: Denies hx of allergies      All other systems were reviewed and are negative (AMA/CMS criteria)      Blood pressure 140/70, pulse 70, height 1.829 m (6'), weight 108.4 kg (239 lb), SpO2 90 %.  General Appearance:  Obese;  Well developed, Well nourished, No acute distress, Non-toxic appearance. O2 NC suppl  HENT:  Normocephalic, Atraumatic, Oropharynx moist mucous membranes, Dentition: , Nose normal.    Eyes:  PERRLA, EOMI, Conjunctiva normal, No discharge.  Neck:  Normal range of motion, No cervical tenderness, Supple, No stridor, no JVD .  No thyromegaly.  No carotid bruit.  Cardiovascular:  Normal heart rate, Normal rhythm,  S1, S2, no S3, S4; No gallops; No murmurs, No rubs, .   Extremitites with intact distal pulses, no cyanosis, clubbing or edema.  No heaves, thrills, HJR;  Peripheral pulses: carotid 2+, brachial 2+, radial 2+, ulnar 2+, femoral 2+, popliteal 2+, PT 2+, DP 2+;  Lungs:  Respiratory effort is normal. Normal breath sounds, breath sounds clear to auscultation bilaterally,  no rales, no rhonchi, no wheezing.   Abdomen: Bowel sounds normal, Soft, No tenderness, No guarding, No rebound, No masses, No hepatosplenomegaly.  Skin: Warm, Dry, No erythema, No rash, no induration or crepitus.  Neurologic: Alert & oriented x 3, Normal motor function, Normal sensory function, No focal deficits noted, cranial nerves II through XII are normal,  normal gait.  Psychiatric: Affect normal, Judgment normal, Mood normal.    Results for ELY SOW (MRN [id number]) as of 12/1/2017 10:37   Ref. Range 9/8/2017 10:11 9/11/2017 09:30 9/16/2017 00:00 10/1/2017 00:00 10/16/2017 00:00 10/21/2017 09:07 10/30/2017 00:00 11/13/2017 00:00   Sodium Latest Ref Range: 136 - 145 mmol/L      141     Potassium Latest Ref Range: 3.5 - 5.1 mmol/L      4.8     Chloride Latest Ref Range: 98 - 107  mmol/L      106     Co2 Latest Ref Range: 21 - 32 mmol/L      26     Anion Gap Latest Ref Range: 10 - 18 mmol/L      14     Glucose Latest Ref Range: 74 - 99 mg/dL      255 (H)     Bun Latest Ref Range: 7 - 18 mg/dL      18     Creatinine Latest Ref Range: 0.8 - 1.3 mg/dL      1.1     GFR If  Latest Ref Range: >60 mL/min/1.73 m 2      >60     GFR If Non  Latest Ref Range: >60 mL/min/1.73 m 2      >60     Calcium Latest Ref Range: 8.5 - 11.0 mg/dL      9.1     AST(SGOT) Latest Ref Range: 15 - 37 U/L      31     ALT(SGPT) Latest Ref Range: 12 - 78 U/L      27     Alkaline Phosphatase Latest Ref Range: 46 - 116 U/L      52     Total Bilirubin Latest Ref Range: 0.2 - 1.0 mg/dL      1.4 (H)     Albumin Latest Ref Range: 3.4 - 5.0 g/dL      3.5     Total Protein Latest Ref Range: 6.4 - 8.2 g/dL      7.6     A-G Ratio Unknown      0.9     Glycohemoglobin Unknown 9.4          INR Unknown   3.4 3.0 3.5  3.3 2.7   Results for ELY SOW (MRN [id number]) as of 12/1/2017 10:37   Ref. Range 9/8/2017 10:11 9/11/2017 09:30   TSH Latest Ref Range: 0.36 - 3.74 uIU/mL  5.44 (H)   Free T-4 Latest Ref Range: 0.76 - 1.46 ng/dL  1.16     Assessment and Plan.   72 y.o. male has high CV risk and labile Blood sugar control; Discussed his lab and goals;     1. Coronary artery disease due to lipid rich plaque  asx    2. Ischemic cardiomyopathy  asx    3. Biventricular automatic implantable cardioverter defibrillator in situ  Stable; fxn fine    4. Essential hypertension  controlled    5. Anticoagulant long-term use  Warfarin    6. GAVINO on CPAP  continue    7. Type 2 diabetes mellitus without complication, unspecified long term insulin use status (CMS-[facility])  PCP and may need more fibers;     8. Mixed hyperlipidemia  recheck      Return to clinic in  3, months    1. Coronary artery disease due to lipid rich plaque     2. Ischemic cardiomyopathy     3. Biventricular automatic implantable cardioverter  defibrillator in situ     4. Essential hypertension     5. Anticoagulant long-term use     6. GAVINO on CPAP     7. Type 2 diabetes mellitus without complication, unspecified long term insulin use status (CMS-[facility])     8. Mixed hyperlipidemia

## 2017-12-02 NOTE — PROGRESS NOTES
Anticoagulation Summary  As of 12/1/2017    INR goal:   2.0-3.0   TTR:   39.3 % (1.7 y)   Today's INR:   4.7!   Maintenance plan:   5 mg (5 mg x 1) on Mon, Wed, Fri; 2.5 mg (5 mg x 0.5) all other days   Weekly total:   25 mg   Plan last modified:   Rafaela Leon PharmD (11/28/2017)   Next INR check:   12/15/2017   Target end date:   Indefinite         Anticoagulation Episode Summary     INR check location:   Outside Lab    Preferred lab:       Send INR reminders to:       Comments:         Anticoagulation Care Providers     Provider Role Specialty Phone number    Zain Winslow M.D. Referring Cardiac Electrophysiology [phone]    Cherelle Nixon, PharmD Responsible          Anticoagulation Patient Findings    Spoke with Chirag to report a supra therapeutic INR of 4.7. Pt will HOLD dose today, then resume current dosing regimen. Follow up in 2 weeks, to reduce risk of adverse events related to this high risk medication,  Warfarin.    Cherelle Nixon, PharmD

## 2017-12-08 ENCOUNTER — OFFICE VISIT (OUTPATIENT)
Dept: ENDOCRINOLOGY | Facility: MEDICAL CENTER | Age: 72
End: 2017-12-08
Payer: MEDICARE

## 2017-12-08 VITALS
WEIGHT: 238.4 LBS | HEART RATE: 88 BPM | OXYGEN SATURATION: 87 % | SYSTOLIC BLOOD PRESSURE: 128 MMHG | DIASTOLIC BLOOD PRESSURE: 78 MMHG | HEIGHT: 72 IN | BODY MASS INDEX: 32.29 KG/M2

## 2017-12-08 DIAGNOSIS — I10 ESSENTIAL HYPERTENSION: ICD-10-CM

## 2017-12-08 DIAGNOSIS — E11.9 TYPE 2 DIABETES MELLITUS WITHOUT COMPLICATION, WITH LONG-TERM CURRENT USE OF INSULIN (HCC): ICD-10-CM

## 2017-12-08 DIAGNOSIS — E03.9 ACQUIRED HYPOTHYROIDISM: ICD-10-CM

## 2017-12-08 DIAGNOSIS — E78.2 MIXED HYPERLIPIDEMIA: ICD-10-CM

## 2017-12-08 DIAGNOSIS — E78.1 HYPERTRIGLYCERIDEMIA: ICD-10-CM

## 2017-12-08 DIAGNOSIS — R79.89 LOW VITAMIN D LEVEL: ICD-10-CM

## 2017-12-08 DIAGNOSIS — Z79.4 TYPE 2 DIABETES MELLITUS WITHOUT COMPLICATION, WITH LONG-TERM CURRENT USE OF INSULIN (HCC): ICD-10-CM

## 2017-12-08 LAB
HBA1C MFR BLD: 8.6 % (ref ?–5.8)
INT CON NEG: NORMAL
INT CON POS: NORMAL

## 2017-12-08 PROCEDURE — 83036 HEMOGLOBIN GLYCOSYLATED A1C: CPT | Performed by: INTERNAL MEDICINE

## 2017-12-08 PROCEDURE — 99214 OFFICE O/P EST MOD 30 MIN: CPT | Performed by: INTERNAL MEDICINE

## 2017-12-08 NOTE — PROGRESS NOTES
Endocrinology Clinic Progress Note  PCP: Thomas Zhang M.D.    CC: Type 2 diabetes    HPI:  Chirag Balderas is a 71 y.o. old patient who comes in today for routine follow up.     Type 2 diabetes: He is currently on NPH 44 units at bedtime and regular insulin 28 units with each meal. Also on metformin 1000 mg twice a day. Reports compliance with medications. He checks blood sugars 4 times a day. Fasting blood sugar in the morning has been in the range of 160-200 in the past 2 weeks. Pre-meal blood sugar readings are better controlled. Last eye exam was done in February 2017.    Hypertension: Blood pressure is well controlled. He reports compliance with medications. He is on ARB.    Hyperlipidemia: LDL 99. He is on Lipitor, tolerating well. He has history of coronary artery disease.    ROS:  Constitutional: No unintentional weight loss  Respiratory: Positive for dyspnea on exertion    Past Medical History:  Patient Active Problem List    Diagnosis Date Noted   • Presence of biventricular AICD 05/02/2016     Priority: High   • Persistent atrial fibrillation (CMS-HCC) 03/04/2016     Priority: High   • Atypical atrial flutter (CMS-HCC) 03/04/2016     Priority: High   • Anticoagulant long-term use 03/04/2016     Priority: High   • Ischemic cardiomyopathy 05/14/2013     Priority: High   • Hypertriglyceridemia 05/14/2013     Priority: High   • Coronary artery disease 06/05/2012     Priority: High   • HTN (hypertension) 06/05/2012     Priority: High   • Hyperlipidemia 06/05/2012     Priority: High   • Systolic CHF (CMS-HCC) 09/05/2014     Priority: Medium   • Sleep apnea 05/16/2013     Priority: Medium   • Type 2 diabetes mellitus without complication (CMS-HCC) 06/05/2012     Priority: Medium   • Hypothyroid 09/03/2014     Priority: Low   • Depression 02/21/2014     Priority: Low   • Chronic obstructive pulmonary disease with acute exacerbation (CMS-HCC) 08/29/2017   • Chronic paroxysmal hemicrania, not intractable  07/09/2015   • Pulmonary nodule 09/03/2014   • ED (erectile dysfunction) 08/20/2013   • Prostate cancer screening 06/25/2013   • Colon cancer screening 06/25/2013     Physical Examination:  Vital signs: /78   Pulse 88   Ht 1.829 m (6')   Wt 108.1 kg (238 lb 6.4 oz)   SpO2 (!) 87%   BMI 32.33 kg/m²   General: No apparent distress, cooperative  Eyes: No scleral icterus, no discharge   Psych: Alert and oriented, normal mood and affect    Assessment and Plan:    Type 2 diabetes mellitus with hyperglycemia, with long-term current use of insulin (HCC)  · Hemoglobin A1c today in the clinic is 8.6%  · Goal hemoglobin A1c less than 7-7.5%  · Increased NPH to 48 units at bedtime and we discussed treat to target recommendations   · Continue regular insulin 28 units with each meal  · Continue metformin 1000 mg twice a day, recent GFR was greater than 60  · Advised to continue checking blood sugars 4 times a day    Essential hypertension  · Blood pressure is well controlled   · Continue ARB, in addition to other antihypertensive agents     Mixed hyperlipidemia  · Continue Lipitor  · LDL 99    Acquired hypothyroidism  · Repeat labs for TSH/free T4 now  · Continue levothyroxine 100 µg daily    Return in about 3 months (around 3/8/2018).    Thank you for allowing me to participate in the care of this patient.    Indira Herrera M.D.    CC:   Thomas Zhang M.D.    This note was created using voice recognition software (Dragon). The accuracy of the dictation is limited by the abilities of the software. I have reviewed the note prior to signing, however some errors in grammar and context are still possible. If you have any questions related to this note please do not hesitate to contact our office.

## 2017-12-08 NOTE — PROGRESS NOTES
"RN-CDE Note    Subjective:     Health changes since last visit/interval Hx: hospitalized with pneumonia in August.     Medications (including changes made today)  Current Outpatient Prescriptions   Medication Sig Dispense Refill   • levothyroxine (SYNTHROID) 112 MCG Tab Take 1 Tab by mouth Every morning on an empty stomach. 60 Tab 1   • insulin regular (NOVOLIN R RELION) 100 Unit/mL Solution Inject 25 Units as instructed 3 times a day before meals. (Patient taking differently: Inject 28 Units as instructed 3 times a day before meals.) 70 mL 3   • insulin NPH (HUMULIN,NOVOLIN) 100 UNIT/ML Suspension Inject 44 Units as instructed every bedtime.     • carvedilol (COREG) 3.125 MG Tab Take 1 Tab by mouth 2 times a day, with meals. 180 Tab 3   • furosemide (LASIX) 40 MG Tab TAKE ONE TABLET BY MOUTH ONCE DAILY AS NEEDED 30 Tab 6   • lisinopril (PRINIVIL) 20 MG Tab Take 1 Tab by mouth every day. 90 Tab 3   • metformin (GLUCOPHAGE) 1000 MG tablet take 1 tablet by mouth twice a day WITH A MEAL 180 Tab 3   • atorvastatin (LIPITOR) 80 MG tablet Take 1 Tab by mouth every day. 90 Tab 3   • warfarin (COUMADIN) 5 MG Tab Take one-half to one tablet by mouth one time daily as directed by anticoagulation clinic (Patient taking differently: 2.5-5 mg. Take one-half to one tablet by mouth one time daily as directed by anticoagulation clinic) 90 Tab 1   • Blood Glucose Monitoring Suppl SUPPLIES Misc Freestyle Freedome Lite test strips. Use to test blood sugars 4-5 times daily as directed. Dx: ICD-10. E11.9, Z74.9 500 Strip 3   • Insulin Syringe-Needle U-100 (INSULIN SYRINGE .3CC/31GX5/16\") 31G X 5/16\" 0.3 ML Misc For insulin shots 5 times a day 200 Each 6   • glucose blood (ONE TOUCH ULTRA TEST) strip Use to test blood sugar levels 5 times daily as directed by physician. ICD-10: E11.9, Z79.4 500 Strip 5     No current facility-administered medications for this visit.        Taking daily ASA: No  Taking above medications as prescribed: " Yes  Patient Denies side effects of medication.    Exercise: no regular exercise, sedentary  Diet: not asked    Health Maintenance:   Health Maintenance Topics with due status: Overdue       Topic Date Due    PFT SCREENING-FEV1 AND FEV/FVC RATIO / SPIROMETRY SHOULD BE PERFORMED ANNUALLY 09/08/1962    IMM DTaP/Tdap/Td Vaccine 09/08/1963    IMM ZOSTER VACCINE 09/08/2004    IMM PNEUMOCOCCAL 65+ (ADULT) LOW/MEDIUM RISK SERIES 10/08/2013    IMM INFLUENZA 09/01/2017    URINE ACR / MICROALBUMIN 10/11/2017       Immunizations:   PPSV23: up to date  Ltyboqh39: up to date  Tdap: unknown  Flu: up to date      DM:   Last A1c:   Lab Results   Component Value Date/Time    HBA1C 8.6 12/08/2017 11:57 AM      A1c goal: < 8    Glucose monitoring frequency: 4 times per day      Hypoglycemic episodes: no     Last Retinal Exam: Feb. 2017  Daily Foot Exam: yes  Routine Dental Exams: no    Lab Results   Component Value Date/Time    MALBCRT 20 10/11/2016 10:00 AM    MICROALBUR 33.2 (H) 10/11/2016 10:00 AM        ACR Albumin/Creatinine Ratio goal <30 above target.         HTN:   Blood pressure goal <140/<80 at goal.   Currently Rx ACE/ARB: Yes    Dyslipidemia:    Lab Results   Component Value Date/Time    CHOLSTRLTOT 175 04/12/2017 08:40 AM    LDL 99 04/12/2017 08:40 AM    HDL 38.0 (L) 04/12/2017 08:40 AM    TRIGLYCERIDE 188 (H) 04/12/2017 08:40 AM       Lab Results   Component Value Date/Time    SODIUM 141 10/21/2017 09:07 AM    POTASSIUM 4.8 10/21/2017 09:07 AM    CHLORIDE 106 10/21/2017 09:07 AM    CO2 26 10/21/2017 09:07 AM    GLUCOSE 255 (H) 10/21/2017 09:07 AM    BUN 18 10/21/2017 09:07 AM    CREATININE 1.1 10/21/2017 09:07 AM     Lab Results   Component Value Date/Time    ALKPHOSPHAT 52 10/21/2017 09:07 AM    ASTSGOT 31 10/21/2017 09:07 AM    ALTSGPT 27 10/21/2017 09:07 AM    TBILIRUBIN 1.4 (H) 10/21/2017 09:07 AM        Currently Rx Statin: Yes      He  reports that he quit smoking about 5 years ago. His smoking use included  Cigarettes. He has a 25.00 pack-year smoking history. He has never used smokeless tobacco.    Objective:     Exam:  Monofilament: not done      Plan:     Discussed All medications, side effects and compliance (discussed carefully)  Annual eye examinations at Ophthalmology  Diabetic diet discussed in detail, written exchange diet given  Foot care discussed and Podiatry visits  Glycohemoglobin and other lab monitoring  Home glucose monitoring emphasized.

## 2017-12-19 ENCOUNTER — ANTICOAGULATION MONITORING (OUTPATIENT)
Dept: VASCULAR LAB | Facility: MEDICAL CENTER | Age: 72
End: 2017-12-19

## 2017-12-19 DIAGNOSIS — I48.19 PERSISTENT ATRIAL FIBRILLATION (HCC): ICD-10-CM

## 2017-12-19 RX ORDER — WARFARIN SODIUM 5 MG/1
TABLET ORAL
Qty: 90 TAB | Refills: 1 | Status: SHIPPED | OUTPATIENT
Start: 2017-12-19 | End: 2018-09-11

## 2017-12-21 ENCOUNTER — ANTICOAGULATION MONITORING (OUTPATIENT)
Dept: VASCULAR LAB | Facility: MEDICAL CENTER | Age: 72
End: 2017-12-21

## 2017-12-21 LAB — INR PPP: 2.2 (ref 2–3.5)

## 2017-12-22 NOTE — PROGRESS NOTES
Anticoagulation Summary  As of 12/21/2017    INR goal:   2.0-3.0   TTR:   39.6 % (1.8 y)   Today's INR:   2.2   Maintenance plan:   5 mg (5 mg x 1) on Mon, Wed, Fri; 2.5 mg (5 mg x 0.5) all other days   Weekly total:   25 mg   Plan last modified:   Rafaela Leon PharmD (11/28/2017)   Next INR check:   1/4/2018   Target end date:   Indefinite         Anticoagulation Episode Summary     INR check location:   Outside Lab    Preferred lab:       Send INR reminders to:       Comments:         Anticoagulation Care Providers     Provider Role Specialty Phone number    Zain Winslow M.D. Referring Cardiac Electrophysiology [phone]    Anna LimaD Responsible          Anticoagulation Patient Findings    Spoke with Chirag to report a therapeutic INR of 2.2 Pt is to continue with current warfarin dosing regimen.  Pt denies any unusual s/s of bleeding, bruising, clotting or any changes to diet or medications.  Follow up in 2 weeks, to reduce risk of adverse events related to this high risk medication,  Warfarin.    Anna LimaD

## 2018-01-05 ENCOUNTER — ANTICOAGULATION MONITORING (OUTPATIENT)
Dept: VASCULAR LAB | Facility: MEDICAL CENTER | Age: 73
End: 2018-01-05

## 2018-01-05 LAB — INR PPP: 2.9 (ref 2–3.5)

## 2018-01-06 NOTE — PROGRESS NOTES
OP Anticoagulation Telephone Note    Date: 1/5/2018  Anticoagulation Summary  As of 1/5/2018    INR goal:   2.0-3.0   TTR:   41.0 % (1.8 y)   Today's INR:   2.9   Maintenance plan:   5 mg (5 mg x 1) on Mon, Wed, Fri; 2.5 mg (5 mg x 0.5) all other days   Weekly total:   25 mg   No change documented:   Viktoria Chaidez, Med Ass't   Plan last modified:   Rafaela Leon PharmD (11/28/2017)   Next INR check:   1/19/2018   Target end date:   Indefinite         Anticoagulation Episode Summary     INR check location:   Outside Lab    Preferred lab:       Send INR reminders to:       Comments:         Anticoagulation Care Providers     Provider Role Specialty Phone number    Zain Winslow M.D. Referring Cardiac Electrophysiology [phone]    Cherelle Nixon PharmD Responsible          Anticoagulation Patient Findings  Patient Findings     Negatives:   Signs/symptoms of thrombosis, Signs/symptoms of bleeding, Laboratory test error suspected, Change in health, Change in alcohol use, Change in activity, Upcoming invasive procedure, Emergency department visit, Upcoming dental procedure, Missed doses, Extra doses, Change in medications, Change in diet/appetite, Hospital admission, Bruising, Other complaints      Plan:  Spoke with patient on the phone. Patient is therapeutic today. Patient denies any changes in medications or diet. Patient denies any signs or symptoms of bleeding or clotting. Instructed patient to call clinic if any unusual bleeding or bruising occurs. Will continue dosing as outlined above. Will follow-up with patient in 2 weeks.    Viktoria Chaidez, Medical Assistant    I have reviewed and agree with the plan above on 01/10/2018      Cherelle Nixon PharmD

## 2018-01-09 DIAGNOSIS — E11.9 TYPE 2 DIABETES MELLITUS WITHOUT COMPLICATION, WITH LONG-TERM CURRENT USE OF INSULIN (HCC): ICD-10-CM

## 2018-01-09 DIAGNOSIS — Z79.4 TYPE 2 DIABETES MELLITUS WITHOUT COMPLICATION, WITH LONG-TERM CURRENT USE OF INSULIN (HCC): ICD-10-CM

## 2018-01-10 DIAGNOSIS — E11.65 TYPE 2 DIABETES MELLITUS WITH HYPERGLYCEMIA, WITH LONG-TERM CURRENT USE OF INSULIN (HCC): ICD-10-CM

## 2018-01-10 DIAGNOSIS — Z79.4 TYPE 2 DIABETES MELLITUS WITH HYPERGLYCEMIA, WITH LONG-TERM CURRENT USE OF INSULIN (HCC): ICD-10-CM

## 2018-01-10 RX ORDER — INSULIN HUMAN 100 [IU]/ML
INJECTION, SUSPENSION SUBCUTANEOUS
Qty: 20 ML | Refills: 5 | Status: SHIPPED | OUTPATIENT
Start: 2018-01-10 | End: 2018-02-12

## 2018-01-10 NOTE — TELEPHONE ENCOUNTER
Received a fax from [email or web address] for Novolin. Patient states they are currently taking 48 units at bedtime.

## 2018-01-22 ENCOUNTER — ANTICOAGULATION MONITORING (OUTPATIENT)
Dept: VASCULAR LAB | Facility: MEDICAL CENTER | Age: 73
End: 2018-01-22

## 2018-01-22 LAB — INR PPP: 3.6 (ref 2–3.5)

## 2018-01-22 NOTE — PROGRESS NOTES
Anticoagulation Summary  As of 1/22/2018    INR goal:   2.0-3.0   TTR:   40.3 % (1.9 y)   Today's INR:   3.6!   Maintenance plan:   5 mg (5 mg x 1) on Mon, Wed, Fri; 2.5 mg (5 mg x 0.5) all other days   Weekly total:   25 mg   Plan last modified:   Anna PhanD (11/28/2017)   Next INR check:   2/12/2018   Target end date:   Indefinite         Anticoagulation Episode Summary     INR check location:   Outside Lab    Preferred lab:       Send INR reminders to:       Comments:   [place]      Anticoagulation Care Providers     Provider Role Specialty Phone number    Zain Winslow M.D. Referring Cardiac Electrophysiology [phone]    Cherelle Nixon, AnnaD Responsible          Anticoagulation Patient Findings        Spoke to patient on the phone.   INR  supra-therapeutic.   Denies signs/symptoms of bleeding and/or thrombosis.   Denies changes to diet or medications.   Follow up appointment in 3 week(s).    2.5mg tonight then continue weekly warfarin dose as noted    Suresh Hammonds, PharmD

## 2018-02-02 LAB — INR PPP: 3.5 (ref 2–3.5)

## 2018-02-05 ENCOUNTER — ANTICOAGULATION MONITORING (OUTPATIENT)
Dept: VASCULAR LAB | Facility: MEDICAL CENTER | Age: 73
End: 2018-02-05

## 2018-02-05 DIAGNOSIS — Z79.01 ANTICOAGULANT LONG-TERM USE: ICD-10-CM

## 2018-02-05 DIAGNOSIS — I48.19 PERSISTENT ATRIAL FIBRILLATION (HCC): ICD-10-CM

## 2018-02-05 DIAGNOSIS — I48.4 ATYPICAL ATRIAL FLUTTER (HCC): ICD-10-CM

## 2018-02-05 NOTE — PROGRESS NOTES
Anticoagulation Summary  As of 2/5/2018    INR goal:   2.0-3.0   TTR:   39.7 % (1.9 y)   Today's INR:   3.5! (2/2/2018)   Maintenance plan:   5 mg (5 mg x 1) on Mon; 2.5 mg (5 mg x 0.5) all other days   Weekly total:   20 mg   Plan last modified:   Anna JeffersD (2/5/2018)   Next INR check:   2/19/2018   Target end date:   Indefinite    Indications    Anticoagulant long-term use [Z79.01]  Atypical atrial flutter (CMS-HCC) [I48.4]  Persistent atrial fibrillation (CMS-HCC) [I48.1]             Anticoagulation Episode Summary     INR check location:   Outside Lab    Preferred lab:       Send INR reminders to:       Comments:   Jessica      Anticoagulation Care Providers     Provider Role Specialty Phone number    Zain Winslow M.D. Referring Cardiac Electrophysiology [phone]    Cherelle Nixon PharmD Responsible          Anticoagulation Patient Findings      Spoke with patient.  INR is SUPRA therapeutic.   Pt denies any unusual s/s of bleeding, bruising, clotting or any changes to diet or medications. Denies any etoh, cranberries, supplements, or illness.   Pt verifies warfarin weekly dosing.     Will have pt HOLD his warfarin today and then start a reduced weekly dose.    Repeat INR in 2 weeks.     Alley Alcala, AnnaD

## 2018-02-12 ENCOUNTER — TELEPHONE (OUTPATIENT)
Dept: ENDOCRINOLOGY | Facility: MEDICAL CENTER | Age: 73
End: 2018-02-12

## 2018-02-12 DIAGNOSIS — Z79.4 TYPE 2 DIABETES MELLITUS WITH HYPERGLYCEMIA, WITH LONG-TERM CURRENT USE OF INSULIN (HCC): ICD-10-CM

## 2018-02-12 DIAGNOSIS — E11.65 TYPE 2 DIABETES MELLITUS WITH HYPERGLYCEMIA, WITH LONG-TERM CURRENT USE OF INSULIN (HCC): ICD-10-CM

## 2018-02-12 NOTE — TELEPHONE ENCOUNTER
Pt called and states an Rx for Humulin N was sent to pharmacy today but he would like to switch it to Novolin N due to being cheaper. Please change. Thank you.

## 2018-02-22 ENCOUNTER — ANTICOAGULATION MONITORING (OUTPATIENT)
Dept: VASCULAR LAB | Facility: MEDICAL CENTER | Age: 73
End: 2018-02-22

## 2018-02-22 DIAGNOSIS — I48.4 ATYPICAL ATRIAL FLUTTER (HCC): ICD-10-CM

## 2018-02-22 DIAGNOSIS — I48.19 PERSISTENT ATRIAL FIBRILLATION (HCC): ICD-10-CM

## 2018-02-22 DIAGNOSIS — Z79.01 ANTICOAGULANT LONG-TERM USE: ICD-10-CM

## 2018-02-22 LAB — INR PPP: 2 (ref 2–3.5)

## 2018-02-22 NOTE — PROGRESS NOTES
Anticoagulation Summary  As of 2/22/2018    INR goal:   2.0-3.0   TTR:   40.4 % (1.9 y)   Today's INR:   2.0   Maintenance plan:   5 mg (5 mg x 1) on Mon; 2.5 mg (5 mg x 0.5) all other days   Weekly total:   20 mg   Plan last modified:   Alley Alcala, PharmD (2/5/2018)   Next INR check:   3/8/2018   Target end date:   Indefinite    Indications    Anticoagulant long-term use [Z79.01]  Atypical atrial flutter (CMS-HCC) [I48.4]  Persistent atrial fibrillation (CMS-HCC) [I48.1]             Anticoagulation Episode Summary     INR check location:   Outside Lab    Preferred lab:       Send INR reminders to:       Comments:   Alere      Anticoagulation Care Providers     Provider Role Specialty Phone number    Zain Winslow M.D. Referring Cardiac Electrophysiology [phone]    Anna LimaD Responsible          Anticoagulation Patient Findings        Spoke to patient on the phone.   INR  therapeutic.   Denies signs/symptoms of bleeding and/or thrombosis.   Denies changes to diet or medications.   Follow up appointment in 2 week(s).    Continue weekly warfarin dose as noted      Suresh Hammonds, PharmD

## 2018-03-01 ENCOUNTER — OFFICE VISIT (OUTPATIENT)
Dept: ENDOCRINOLOGY | Facility: MEDICAL CENTER | Age: 73
End: 2018-03-01
Payer: MEDICARE

## 2018-03-01 VITALS
HEIGHT: 72 IN | WEIGHT: 240.6 LBS | SYSTOLIC BLOOD PRESSURE: 118 MMHG | BODY MASS INDEX: 32.59 KG/M2 | DIASTOLIC BLOOD PRESSURE: 60 MMHG

## 2018-03-01 DIAGNOSIS — Z79.4 TYPE 2 DIABETES MELLITUS WITH HYPERGLYCEMIA, WITH LONG-TERM CURRENT USE OF INSULIN (HCC): ICD-10-CM

## 2018-03-01 DIAGNOSIS — E11.65 TYPE 2 DIABETES MELLITUS WITH HYPERGLYCEMIA, WITH LONG-TERM CURRENT USE OF INSULIN (HCC): ICD-10-CM

## 2018-03-01 DIAGNOSIS — I10 ESSENTIAL HYPERTENSION: ICD-10-CM

## 2018-03-01 DIAGNOSIS — E78.2 MIXED HYPERLIPIDEMIA: ICD-10-CM

## 2018-03-01 DIAGNOSIS — E03.9 ACQUIRED HYPOTHYROIDISM: ICD-10-CM

## 2018-03-01 LAB
HBA1C MFR BLD: 8.7 % (ref ?–5.8)
INT CON NEG: NORMAL
INT CON POS: NORMAL

## 2018-03-01 PROCEDURE — 83036 HEMOGLOBIN GLYCOSYLATED A1C: CPT | Performed by: INTERNAL MEDICINE

## 2018-03-01 PROCEDURE — 99214 OFFICE O/P EST MOD 30 MIN: CPT | Performed by: INTERNAL MEDICINE

## 2018-03-01 NOTE — PROGRESS NOTES
Endocrinology Clinic Progress Note  PCP: Thomas Zhang M.D.    CC: Type 2 diabetes    HPI:  Chirag Balderas is a 71 y.o. old patient who comes in today for routine follow up.     Type 2 diabetes: He is currently on NPH 44 units at bedtime and regular insulin 28 units with each meal. Also on metformin 1000 mg twice a day. Reports compliance with medications. He checks blood sugars 4 times a day. Fasting blood sugar this morning was 68. In the past agreed his fasting blood sugars have been mostly below 140. Pre-meal blood sugar reading in the last few days have been mostly in the range of 120-170. Denies numbness or tingling in feet. He is due for repeat eye exam.    Hypertension: Blood pressure is well controlled. He reports compliance with medications. He is on ARB.    Hyperlipidemia: He is currently on Lipitor, tolerating well. He has history of coronary artery disease.    ROS:  Constitutional: No unintentional weight loss  Respiratory: Positive for dyspnea on exertion    Past Medical History:  Patient Active Problem List    Diagnosis Date Noted   • Presence of biventricular AICD 05/02/2016     Priority: High   • Persistent atrial fibrillation (CMS-HCC) 03/04/2016     Priority: High   • Atypical atrial flutter (CMS-HCC) 03/04/2016     Priority: High   • Anticoagulant long-term use 03/04/2016     Priority: High   • Ischemic cardiomyopathy 05/14/2013     Priority: High   • Hypertriglyceridemia 05/14/2013     Priority: High   • Coronary artery disease 06/05/2012     Priority: High   • HTN (hypertension) 06/05/2012     Priority: High   • Hyperlipidemia 06/05/2012     Priority: High   • Systolic CHF (CMS-HCC) 09/05/2014     Priority: Medium   • Sleep apnea 05/16/2013     Priority: Medium   • Type 2 diabetes mellitus without complication (CMS-HCC) 06/05/2012     Priority: Medium   • Hypothyroid 09/03/2014     Priority: Low   • Depression 02/21/2014     Priority: Low   • Chronic obstructive pulmonary disease with  acute exacerbation (CMS-HCC) 08/29/2017   • Chronic paroxysmal hemicrania, not intractable 07/09/2015   • Pulmonary nodule 09/03/2014   • ED (erectile dysfunction) 08/20/2013   • Prostate cancer screening 06/25/2013   • Colon cancer screening 06/25/2013     Physical Examination:  Vital signs: /60   Ht 1.829 m (6')   Wt 109.1 kg (240 lb 9.6 oz)   BMI 32.63 kg/m²   General: No apparent distress, cooperative  Eyes: No scleral icterus, no discharge   Resp: Normal effort, clear to auscultation bilaterally  CVS: Regular rate and rhythm, normal S1 S2  Psych: Alert and oriented, normal mood and affect  Extremities: Trace lower extremity edema    Assessment and Plan:    Type 2 diabetes mellitus with hyperglycemia, with long-term current use of insulin ([facility])  · Hemoglobin A1c today in the clinic is 8.7%  · Goal hemoglobin A1c less than 7-7.5%  · Lowered NPH to 44 units at bedtime and we discussed treat to target recommendations   · Continue regular insulin 28 units with each meal  · He is on NPH and regular insulin due to cost issues, he had a very high co-pay for newer insulins in the past  · Continue metformin 1000 mg twice a day  · Advised to continue checking blood sugars 4 times a day    Essential hypertension  · Blood pressure is well controlled   · Continue ARB, in addition to other antihypertensive agents     Mixed hyperlipidemia  · Continue Lipitor  · , it was 99 last year on the same dose of Lipitor, he reports compliance with Lipitor  · We will repeat lipid profile within a few months    Acquired hypothyroidism  · Recent TSH and free T4 normal  · Continue levothyroxine 100 µg daily    Return in about 3 months (around 6/1/2018).    Thank you for allowing me to participate in the care of this patient.    Indira Herrera M.D.    CC:   Thomas Zhang M.D.    This note was created using voice recognition software (Dragon). The accuracy of the dictation is limited by the abilities of the software. I have  reviewed the note prior to signing, however some errors in grammar and context are still possible. If you have any questions related to this note please do not hesitate to contact our office.

## 2018-03-01 NOTE — PROGRESS NOTES
"RN-CDE Note    Subjective:     Health changes since last visit/interval Hx: None    Medications (including changes made today)  Current Outpatient Prescriptions   Medication Sig Dispense Refill   • insulin NPH (NOVOLIN N RELION) 100 UNIT/ML Suspension Inject 20 Units as instructed 2 Times a Day. 20 mL 6   • levothyroxine (SYNTHROID) 112 MCG Tab TAKE ONE TABLET BY MOUTH IN THE MORNING ON EMPTY STOMACH 90 Tab 3   • insulin regular (NOVOLIN R RELION) 100 Unit/mL Solution Inject 28 Units as instructed 3 times a day before meals. 20 mL 1   • warfarin (COUMADIN) 5 MG Tab Take one-half to one tablet by mouth one time daily as directed by anticoagulation clinic 90 Tab 1   • Insulin Syringe-Needle U-100 31G X 3/8\" 0.5 ML Misc Using 5 per day with insulin 400 Each 2   • carvedilol (COREG) 3.125 MG Tab Take 1 Tab by mouth 2 times a day, with meals. 180 Tab 3   • furosemide (LASIX) 40 MG Tab TAKE ONE TABLET BY MOUTH ONCE DAILY AS NEEDED 30 Tab 6   • lisinopril (PRINIVIL) 20 MG Tab Take 1 Tab by mouth every day. 90 Tab 3   • metformin (GLUCOPHAGE) 1000 MG tablet take 1 tablet by mouth twice a day WITH A MEAL 180 Tab 3   • atorvastatin (LIPITOR) 80 MG tablet Take 1 Tab by mouth every day. 90 Tab 3   • Blood Glucose Monitoring Suppl SUPPLIES [employer] Freestyle Freedome Lite test strips. Use to test blood sugars 4-5 times daily as directed. Dx: ICD-10. E11.9, Z74.9 500 Strip 3   • glucose blood (ONE TOUCH ULTRA TEST) strip Use to test blood sugar levels 5 times daily as directed by physician. ICD-10: E11.9, Z79.4 500 Strip 5     No current facility-administered medications for this visit.        Taking daily ASA: No (on Warfarin)  Taking above medications as prescribed: Yes  Patient Denies side effects of medication.    Exercise: no regular exercise, sedentary  Diet: well balanced    Health Maintenance:   Health Maintenance Topics with due status: Overdue       Topic Date Due    PFT SCREENING-FEV1 AND FEV/FVC RATIO / SPIROMETRY SHOULD " BE PERFORMED ANNUALLY 09/08/1962    IMM DTaP/Tdap/Td Vaccine 09/08/1963    IMM ZOSTER VACCINE 09/08/2004    IMM PNEUMOCOCCAL 65+ (ADULT) LOW/MEDIUM RISK SERIES 10/08/2013    IMM INFLUENZA 09/01/2017    URINE ACR / MICROALBUMIN 10/11/2017    RETINAL SCREENING 02/20/2018         DM:   Last A1c:   Lab Results   Component Value Date/Time    HBA1C 8.6 12/08/2017 11:57 AM      A1c goal: < 7-7.5    Glucose monitoring frequency: 4 times per day      Hypoglycemic episodes: yes - had a low this morning     Last Retinal Exam: was due 2/20/18, he will call for appt.    Daily Foot Exam: yes  Routine Dental Exams: no    Lab Results   Component Value Date/Time    MALBCRT 20 10/11/2016 10:00 AM    MICROALBUR 33.2 (H) 10/11/2016 10:00 AM        ACR Albumin/Creatinine Ratio goal <30 at goal        HTN:   Blood pressure goal <140/<80 at goal.   Currently Rx ACE/ARB: Yes    Dyslipidemia:    Lab Results   Component Value Date/Time    CHOLSTRLTOT 261 (H) 02/21/2018 08:40 AM     (H) 02/21/2018 08:40 AM    HDL 37.0 (L) 02/21/2018 08:40 AM    TRIGLYCERIDE 359 (H) 02/21/2018 08:40 AM       Lab Results   Component Value Date/Time    SODIUM 141 10/21/2017 09:07 AM    POTASSIUM 4.8 10/21/2017 09:07 AM    CHLORIDE 106 10/21/2017 09:07 AM    CO2 26 10/21/2017 09:07 AM    GLUCOSE 255 (H) 10/21/2017 09:07 AM    BUN 18 10/21/2017 09:07 AM    CREATININE 1.1 10/21/2017 09:07 AM     Lab Results   Component Value Date/Time    ALKPHOSPHAT 52 10/21/2017 09:07 AM    ASTSGOT 31 10/21/2017 09:07 AM    ALTSGPT 27 10/21/2017 09:07 AM    TBILIRUBIN 1.4 (H) 10/21/2017 09:07 AM        Currently Rx Statin: Yes      He  reports that he quit smoking about 5 years ago. His smoking use included Cigarettes. He has a 25.00 pack-year smoking history. He has never used smokeless tobacco.    Objective:     Exam:  Monofilament: not done      Plan:     Discussed All medications, side effects and compliance (discussed carefully)  Annual eye examinations at  Ophthalmology  Diabetic diet discussed in detail, written exchange diet given.         Patient educated on: Discussed moving injections to new site as he is developing scar tissue on the abdomen, may need to decrease doses if having lows with site change.

## 2018-03-05 ENCOUNTER — TELEPHONE (OUTPATIENT)
Dept: CARDIOLOGY | Facility: MEDICAL CENTER | Age: 73
End: 2018-03-05

## 2018-03-05 DIAGNOSIS — E78.49 OTHER HYPERLIPIDEMIA: ICD-10-CM

## 2018-03-05 RX ORDER — ROSUVASTATIN CALCIUM 40 MG/1
40 TABLET, COATED ORAL DAILY
Qty: 90 TAB | Refills: 1 | OUTPATIENT
Start: 2018-03-05 | End: 2018-09-18 | Stop reason: SDUPTHER

## 2018-03-05 NOTE — TELEPHONE ENCOUNTER
----- Message from SAKINA Waterman sent at 3/5/2018  8:52 AM PST -----  Is he taking Lipitor 80mg?  (Doesn't look like it).  IF YES, is he willing to add a PCSK9 inhibitor?  IF NO, needs to resume (or switch to Crestor 40mg), and repeat CMP and lipid panel in 1 month.  Keep April 2018 FU with CW.  Thanks, AB

## 2018-03-05 NOTE — TELEPHONE ENCOUNTER
Pt. notified LDL now 152 (10 months ago was 99). Confirms he is taking atorvastatin 80 mg. Willing to switch to rosuvastatin 40 mg & repeat lab just prior to April FV with Dr. Rush. Advised pt. there are some other options Dr. Rush can discuss wit him at appt. if LDL remains elevated. Rx called to [facility]. Lab order mailed.

## 2018-03-08 ENCOUNTER — TELEPHONE (OUTPATIENT)
Dept: ENDOCRINOLOGY | Facility: MEDICAL CENTER | Age: 73
End: 2018-03-08

## 2018-03-08 DIAGNOSIS — E11.65 TYPE 2 DIABETES MELLITUS WITH HYPERGLYCEMIA, WITH LONG-TERM CURRENT USE OF INSULIN (HCC): ICD-10-CM

## 2018-03-08 DIAGNOSIS — E78.2 MIXED HYPERLIPIDEMIA: ICD-10-CM

## 2018-03-08 DIAGNOSIS — Z79.4 TYPE 2 DIABETES MELLITUS WITH HYPERGLYCEMIA, WITH LONG-TERM CURRENT USE OF INSULIN (HCC): ICD-10-CM

## 2018-03-08 NOTE — TELEPHONE ENCOUNTER
Pt is requesting lab orders. Says they were suppose to get some from last appt. Please advise. Pt would like them mailed to him.

## 2018-03-09 LAB — INR PPP: 2.4 (ref 2–3.5)

## 2018-03-12 ENCOUNTER — ANTICOAGULATION MONITORING (OUTPATIENT)
Dept: VASCULAR LAB | Facility: MEDICAL CENTER | Age: 73
End: 2018-03-12

## 2018-03-12 DIAGNOSIS — I48.4 ATYPICAL ATRIAL FLUTTER (HCC): ICD-10-CM

## 2018-03-12 DIAGNOSIS — Z79.01 ANTICOAGULANT LONG-TERM USE: ICD-10-CM

## 2018-03-12 DIAGNOSIS — I48.19 PERSISTENT ATRIAL FIBRILLATION (HCC): ICD-10-CM

## 2018-03-12 NOTE — PROGRESS NOTES
Anticoagulation Summary  As of 3/12/2018    INR goal:   2.0-3.0   TTR:   41.7 % (2 y)   Today's INR:   2.4 (3/9/2018)   Maintenance plan:   5 mg (5 mg x 1) on Mon; 2.5 mg (5 mg x 0.5) all other days   Weekly total:   20 mg   Plan last modified:   Alley Alcala, PharmD (2/5/2018)   Next INR check:   3/26/2018   Target end date:   Indefinite    Indications    Anticoagulant long-term use [Z79.01]  Atypical atrial flutter (CMS-HCC) [I48.4]  Persistent atrial fibrillation (CMS-HCC) [I48.1]             Anticoagulation Episode Summary     INR check location:   Outside Lab    Preferred lab:       Send INR reminders to:       Comments:   Jessica      Anticoagulation Care Providers     Provider Role Specialty Phone number    Zain Winslow M.D. Referring Cardiac Electrophysiology [phone]    Anna LimaD Responsible          Anticoagulation Patient Findings        Spoke to patient on the phone.   INR  therapeutic.   Denies signs/symptoms of bleeding and/or thrombosis.   Denies changes to diet or medications.   Follow up appointment in 2 week(s).    Continue weekly warfarin dose as noted    Suresh Hammonds, PharmD

## 2018-03-30 ENCOUNTER — PATIENT OUTREACH (OUTPATIENT)
Dept: HEALTH INFORMATION MANAGEMENT | Facility: OTHER | Age: 73
End: 2018-03-30

## 2018-03-30 NOTE — PROGRESS NOTES
1. Attempt #: 1    2. [facility] Verified: yes    3. Verify PCP: yes    5.  Reviewed/Updated the following with patient:       •   Communication Preference Obtained? YES    6. [employer] Activation: already active    7. [employer] Fifi: no    8. Annual Wellness Visit Scheduling  Scheduling Status:Scheduled      9. Care Gap Scheduling (Attempt to Schedule EACH Overdue Care Gap!)     Health Maintenance Due   Topic Date Due   • PFT SCREENING-FEV1 AND FEV/FVC RATIO / SPIROMETRY SHOULD BE PERFORMED ANNUALLY  09/08/1962   • IMM DTaP/Tdap/Td Vaccine (1 - Tdap) 09/08/1963   • IMM ZOSTER VACCINE  09/08/2004   • IMM PNEUMOCOCCAL 65+ (ADULT) LOW/MEDIUM RISK SERIES (2 of 2 - PCV13) 10/08/2013   • IMM INFLUENZA (1) 09/01/2017   • URINE ACR / MICROALBUMIN  10/11/2017   • RETINAL SCREENING  02/20/2018        Scheduled patient for Annual Wellness Visit    10. Patient was advised: “This is a free wellness visit. The provider will screen for medical conditions to help you stay healthy. If you have other concerns to address you may be asked to discuss these at a separate visit or there may be an additional fee.”     11. Patient was informed to arrive 15 min prior to their scheduled appointment and bring in their medication bottles.

## 2018-03-31 LAB — INR PPP: 3.3 (ref 2–3.5)

## 2018-04-02 ENCOUNTER — ANTICOAGULATION MONITORING (OUTPATIENT)
Dept: VASCULAR LAB | Facility: MEDICAL CENTER | Age: 73
End: 2018-04-02

## 2018-04-02 DIAGNOSIS — Z79.01 ANTICOAGULANT LONG-TERM USE: ICD-10-CM

## 2018-04-02 DIAGNOSIS — I48.19 PERSISTENT ATRIAL FIBRILLATION (HCC): ICD-10-CM

## 2018-04-02 DIAGNOSIS — I48.4 ATYPICAL ATRIAL FLUTTER (HCC): ICD-10-CM

## 2018-04-02 NOTE — PROGRESS NOTES
Anticoagulation Summary  As of 4/2/2018    INR goal:   2.0-3.0   TTR:   42.4 % (2 y)   Today's INR:   3.3! (3/31/2018)   Maintenance plan:   5 mg (5 mg x 1) on Mon; 2.5 mg (5 mg x 0.5) all other days   Weekly total:   20 mg   Plan last modified:   Alley Alcala PharmD (2/5/2018)   Next INR check:   4/16/2018   Target end date:   Indefinite    Indications    Anticoagulant long-term use [Z79.01]  Atypical atrial flutter (CMS-HCC) [I48.4]  Persistent atrial fibrillation (CMS-HCC) [I48.1]             Anticoagulation Episode Summary     INR check location:   Outside Lab    Preferred lab:       Send INR reminders to:       Comments:   Jessica      Anticoagulation Care Providers     Provider Role Specialty Phone number    Zain Winslow M.D. Referring Cardiac Electrophysiology [phone]    Cherelle Nixon PharmD Responsible          Anticoagulation Patient Findings      Spoke with patient.  INR is SUPRA therapeutic.   Pt denies any unusual s/s of bleeding, bruising, clotting or any changes to diet or medications. Denies any etoh, cranberries, supplements, or illness.   Pt verifies warfarin weekly dosing.     Will have pt take a reduced dose of 2.5mg x1 today and then tomorrow to resume his normal warfarin dosing.     Repeat INR in 2 week(s).     Alley Alcala, PharmD

## 2018-04-13 DIAGNOSIS — E10.37X9: ICD-10-CM

## 2018-04-13 RX ORDER — FUROSEMIDE 40 MG/1
TABLET ORAL
Qty: 90 TAB | Refills: 0 | Status: SHIPPED | OUTPATIENT
Start: 2018-04-13 | End: 2018-07-02 | Stop reason: SDUPTHER

## 2018-04-14 LAB — INR PPP: 2.5 (ref 2–3.5)

## 2018-04-16 ENCOUNTER — ANTICOAGULATION MONITORING (OUTPATIENT)
Dept: VASCULAR LAB | Facility: MEDICAL CENTER | Age: 73
End: 2018-04-16

## 2018-04-16 DIAGNOSIS — Z79.01 ANTICOAGULANT LONG-TERM USE: ICD-10-CM

## 2018-04-16 DIAGNOSIS — I48.4 ATYPICAL ATRIAL FLUTTER (HCC): ICD-10-CM

## 2018-04-16 DIAGNOSIS — I48.19 PERSISTENT ATRIAL FIBRILLATION (HCC): ICD-10-CM

## 2018-04-16 NOTE — PROGRESS NOTES
Anticoagulation Summary  As of 4/16/2018    INR goal:   2.0-3.0   TTR:   42.8 % (2.1 y)   Today's INR:   2.5 (4/14/2018)   Maintenance plan:   5 mg (5 mg x 1) on Mon; 2.5 mg (5 mg x 0.5) all other days   Weekly total:   20 mg   Plan last modified:   Alley Alcala, PharmD (2/5/2018)   Next INR check:   4/27/2018   Target end date:   Indefinite    Indications    Anticoagulant long-term use [Z79.01]  Atypical atrial flutter (CMS-HCC) [I48.4]  Persistent atrial fibrillation (CMS-HCC) [I48.1]             Anticoagulation Episode Summary     INR check location:   Outside Lab    Preferred lab:       Send INR reminders to:       Comments:   Alere      Anticoagulation Care Providers     Provider Role Specialty Phone number    Zain Winslow M.D. Referring Cardiac Electrophysiology [phone]    Anna LimaD Responsible          Anticoagulation Patient Findings    Left voicemail message to report a therapeutic INR of 2.5.  Pt to continue with current warfarin dosing regimen. Requested pt contact the clinic for any s/s of unusual bleeding, bruising, clotting or any changes to diet or medication. FU 2 weeks.  Rafat Marcial, AnnaD

## 2018-04-19 ENCOUNTER — OFFICE VISIT (OUTPATIENT)
Dept: CARDIOLOGY | Facility: CLINIC | Age: 73
End: 2018-04-19
Payer: MEDICARE

## 2018-04-19 VITALS
DIASTOLIC BLOOD PRESSURE: 70 MMHG | SYSTOLIC BLOOD PRESSURE: 110 MMHG | BODY MASS INDEX: 32.69 KG/M2 | OXYGEN SATURATION: 90 % | WEIGHT: 241 LBS | HEART RATE: 70 BPM

## 2018-04-19 DIAGNOSIS — E78.5 DYSLIPIDEMIA: ICD-10-CM

## 2018-04-19 DIAGNOSIS — Z95.810 PRESENCE OF BIVENTRICULAR AICD: ICD-10-CM

## 2018-04-19 DIAGNOSIS — I48.19 PERSISTENT ATRIAL FIBRILLATION (HCC): ICD-10-CM

## 2018-04-19 DIAGNOSIS — I50.22 CHF (CONGESTIVE HEART FAILURE), NYHA CLASS II, CHRONIC, SYSTOLIC (HCC): Chronic | ICD-10-CM

## 2018-04-19 DIAGNOSIS — I48.4 ATYPICAL ATRIAL FLUTTER (HCC): ICD-10-CM

## 2018-04-19 DIAGNOSIS — E78.1 HYPERTRIGLYCERIDEMIA: ICD-10-CM

## 2018-04-19 DIAGNOSIS — I25.10 CORONARY ARTERY DISEASE DUE TO LIPID RICH PLAQUE: ICD-10-CM

## 2018-04-19 DIAGNOSIS — Z79.01 ANTICOAGULANT LONG-TERM USE: ICD-10-CM

## 2018-04-19 DIAGNOSIS — I25.5 ISCHEMIC CARDIOMYOPATHY: ICD-10-CM

## 2018-04-19 DIAGNOSIS — I10 ESSENTIAL HYPERTENSION: ICD-10-CM

## 2018-04-19 DIAGNOSIS — Z95.5 HISTORY OF CORONARY ARTERY STENT PLACEMENT: ICD-10-CM

## 2018-04-19 DIAGNOSIS — I25.83 CORONARY ARTERY DISEASE DUE TO LIPID RICH PLAQUE: ICD-10-CM

## 2018-04-19 PROCEDURE — 99214 OFFICE O/P EST MOD 30 MIN: CPT | Performed by: INTERNAL MEDICINE

## 2018-04-19 ASSESSMENT — ENCOUNTER SYMPTOMS
BLURRED VISION: 0
ABDOMINAL PAIN: 0
PND: 0
BRUISES/BLEEDS EASILY: 0
DIZZINESS: 0
FOCAL WEAKNESS: 0
BACK PAIN: 1
CLAUDICATION: 0
PALPITATIONS: 0
CHILLS: 0
SHORTNESS OF BREATH: 0
COUGH: 0
FEVER: 0
WEAKNESS: 0
NAUSEA: 0
FALLS: 0
SORE THROAT: 0

## 2018-04-19 NOTE — PROGRESS NOTES
Chief Complaint   Patient presents with   • Atrial Fibrillation       Subjective:   Chirag Balderas is a 72 y.o. male who presents today for follow-up of his history of ischemic cardiomyopathy having had an inferior MI many years ago status post stenting at that time in [place] chronic reduction in ejection fraction with atrial arrhythmias in 2016 status post ICD    He's been doing well over the past year his ejection fraction is remaining stable he did revert back to chronic atrial fibrillation and is doing well on chronic anticoagulation    Weight overall has been stable    worked very hard at improving his diabetes control unfortunately many agents are too expensive    Past Medical History:   Diagnosis Date   • Atrial fibrillation, persistent (CMS-HCC)         • Breath shortness     On oxygen   • Cataract     Bilateral IOL   • CHF (congestive heart failure), NYHA class II, chronic, systolic (CMS-HCC) - EF 40-45% 2017    • Chronic anticoagulation         • COPD (chronic obstructive pulmonary disease) (CMS-HCC)    • Coronary artery disease 2002    History of remote MI/stent   • Dental disorder     Dentures   • Depression    • DIABETES MELLITUS     On insulin   • HTN (hypertension)     • Hyperlipidemia     • Hypothyroidism    • Ischemic cardiomyopathy March 2015    Echocardiogram with LVEF 30-40%. Mild-moderate septal hypertrophy. Moderately enlarged LA.  Moderate-severely enlarged RA. Mild AR. Mild MR. Mild TR, RVSP 5-10mmHg.   • Myocardial infarction 2002   • Sleep apnea     Uses CPAP   • Supplemental oxygen dependent    • Systolic CHF (CMS-HCC)       Past Surgical History:   Procedure Laterality Date   • RECOVERY  3/18/2016    Procedure: CATH LAB BIV ICD INSERT [facility];  Surgeon: Deyan[facility] Surgery;  [place] [facility] [place]  Service:    • AICD IMPLANT  March 2016    [employer] [id number] CRT-D implanted by Dr. Winslow.   • OTHER NEUROLOGICAL SURG  03/2013     "Laminectomy lumbar   • CARDIAC CATH  2002   • CATARACT EXTRACTION WITH IOL Right 2000        • CATARACT EXTRACTION WITH IOL Left 1980          Family History   Problem Relation Age of Onset   • Other Mother 84     failure to thrive   • Heart Disease Mother    • Heart Attack Father 72     Social History     Social History   • Marital status:      Spouse name: N/A   • Number of children: N/A   • Years of education: N/A     Occupational History   • Not on file.     Social History Main Topics   • Smoking status: Former Smoker     Packs/day: 0.50     Years: 50.00     Types: Cigarettes     Quit date: 11/1/2012   • Smokeless tobacco: Never Used   • Alcohol use No   • Drug use: No   • Sexual activity: Not on file     Other Topics Concern   • Not on file     Social History Narrative   • No narrative on file     Allergies   Allergen Reactions   • Pcn [Penicillins]      swelling     Outpatient Encounter Prescriptions as of 4/19/2018   Medication Sig Dispense Refill   • furosemide (LASIX) 40 MG Tab TAKE ONE TABLET BY MOUTH ONCE DAILY AS NEEDED 90 Tab 0   • rosuvastatin (CRESTOR) 40 MG tablet Take 1 Tab by mouth every day. 90 Tab 1   • insulin NPH (NOVOLIN N RELION) 100 UNIT/ML Suspension Inject 20 Units as instructed 2 Times a Day. (Patient taking differently: Inject 48 Units as instructed Once.) 20 mL 6   • levothyroxine (SYNTHROID) 112 MCG Tab TAKE ONE TABLET BY MOUTH IN THE MORNING ON EMPTY STOMACH 90 Tab 3   • insulin regular (NOVOLIN R RELION) 100 Unit/mL Solution Inject 28 Units as instructed 3 times a day before meals. 20 mL 1   • warfarin (COUMADIN) 5 MG Tab Take one-half to one tablet by mouth one time daily as directed by anticoagulation clinic 90 Tab 1   • Insulin Syringe-Needle U-100 31G X 3/8\" 0.5 ML Misc Using 5 per day with insulin 400 Each 2   • carvedilol (COREG) 3.125 MG Tab Take 1 Tab by mouth 2 times a day, with meals. 180 Tab 3   • lisinopril (PRINIVIL) 20 MG Tab Take 1 Tab by mouth every day. 90 Tab " 3   • metformin (GLUCOPHAGE) 1000 MG tablet take 1 tablet by mouth twice a day WITH A MEAL 180 Tab 3   • Blood Glucose Monitoring Suppl SUPPLIES [facility] Freestyle Freedome Lite test strips. Use to test blood sugars 4-5 times daily as directed. Dx: ICD-10. E11.9, Z74.9 500 Strip 3   • glucose blood (ONE TOUCH ULTRA TEST) strip Use to test blood sugar levels 5 times daily as directed by physician. ICD-10: E11.9, Z79.4 500 Strip 5     No facility-administered encounter medications on file as of 4/19/2018.      Review of Systems   Constitutional: Negative for chills and fever.   HENT: Negative for sore throat.    Eyes: Negative for blurred vision.   Respiratory: Negative for cough and shortness of breath.    Cardiovascular: Negative for chest pain, palpitations, claudication, leg swelling and PND.   Gastrointestinal: Negative for abdominal pain and nausea.   Musculoskeletal: Positive for back pain and joint pain. Negative for falls.   Skin: Negative for rash.   Neurological: Negative for dizziness, focal weakness and weakness.   Endo/Heme/Allergies: Does not bruise/bleed easily.        Objective:   /70   Pulse 70   Wt 109.3 kg (241 lb)   SpO2 90%   BMI 32.69 kg/m²     Physical Exam   Constitutional: No distress.   HENT:   Mouth/Throat: Oropharynx is clear and moist. No oropharyngeal exudate.   Eyes: No scleral icterus.   Neck: No JVD present.   Cardiovascular: Normal rate and normal heart sounds.  Exam reveals no gallop and no friction rub.    No murmur heard.  Pulmonary/Chest: No respiratory distress. He has no wheezes. He has no rales.   Abdominal: Soft. Bowel sounds are normal.   Musculoskeletal: He exhibits no edema.   Neurological: He is alert.   Skin: No rash noted. He is not diaphoretic.   Psychiatric: He has a normal mood and affect.     Pacemaker finds permanent atrial fibrillation    Labs reviewed lipids are goal at times but otherwise elevated at times we had increased to rosuvastatin 40    Assessment:      1. Coronary artery disease due to lipid rich plaque     2. Essential hypertension     3. Dyslipidemia     4. Ischemic cardiomyopathy     5. Hypertriglyceridemia     6. Persistent atrial fibrillation (CMS-HCC)     7. Atypical atrial flutter (CMS-HCC)     8. Anticoagulant long-term use     9. Presence of biventricular AICD     10. CHF (congestive heart failure), NYHA class II, chronic, systolic (CMS-[facility]) EF 40-45% 2017     11. History of coronary artery stent placement ~2001         Medical Decision Making:  Today's Assessment / Status / Plan:     It was my pleasure to meet with Mr. Balderas.    Is accompanied by his wife    He will follow-up lipids with his next lab testing to see if his LDL gets to go remarkably in the past he's had LDL as low as 50 but it fluctuates for many years including his old [place] records going back to 2005.  If he doesn't reach goal certainly could consider the PCSK9 inhibitors. Would imagine that those are too expensive    Talk with your primary care doctor about Jardiance (preferred) or Invokana, there are cardiovascular benefits but there are also risks.    Blood pressure is well controlled    I will see Mr. Balderas back in 1 year time and encouraged him to follow up with us over the phone or e-mail using my MyChart as issues arise.    It is my pleasure to participate in the care of Mr. Balderas.  Please do not hesitate to contact me with questions or concerns.    Reza Rush MD PhD [facility]  Cardiologist [facility]

## 2018-04-19 NOTE — PATIENT INSTRUCTIONS
Talk with your primary care doctor about Jardiance (preferred) or Invokana, there are cardiovascular benefits but there are also risks.    Also consider PCSK9 (Injectable for cholesterol)    Please look into the following diets and incorporate them into your diet    FOR TREATMENT OR PREVENTION OF CORONARY ARTERY DISEASE    [facility] - [facility]    Dr Goddard - [facility] over [facility] (book and documentary)    Dr Middleton - [facility] Cardi[facility]    FOR TREATMENT OF BLOOD PRESSURE  DASH DIET - American Heart Association for treatment of HYPERTENSION    KEEP YOUR SODIUM EQUAL TO CALORIES AND NO MORE THAN DOUBLE THE CALORIES FOR A LOW SALT DIET    FOR TREATMENT OF BAD CHOLESTEROL/FATS  REDUCE PROCESSED SUGAR AS MUCH AS POSSIBLE  INCREASE WHOLE GRAINS/VEGETABLES    Lowering total cholesterol and LDL (bad) cholesterol:  - Eat leaner cuts of meat, or eliminate altogether if possible red meat, and frequently substitute fish or chicken.  - Limit saturated fat to no more than 7-10% of total calories no more than 10 g per day is recommended. Some sources of saturated fat include butter, animal fats, hydrogenated vegetable fats and oils, many desserts, whole milk dairy products.  - Replaced saturated fats with polyunsaturated fats and monounsaturated fats. Foods high in monounsaturated fat include nuts, although well, canola oil, avocados, and olives  Limited transfer at (count and processed foods) and replaced with fresh fruits and vegetables  - Recommend nonfat dairy products  - Increase substantially the amount of soluble fiber intake (, legumes such as beans, fruit whole grains.  - Consider nutritional supplements: plant sterile spreads such as Benecol, fish oil,  flaxseed oil, omega-3 acids capsules 1000 mg twice a day,  or viscous fiber such as Metamucil  - Attain ideal weight and regular exercise (at least 30 minutes per day of walking)    Lowering triglycerides:  - Reduce intake of simple sugar:  Desserts, candy, pastries, honey, sodas, sugared cereals, yogurt, Gatorade, sports bars, canned fruit, smoothies, fruit juice, coffee drinks  - Reduced intake of refined starches:) Refined Posta  - Reduce or abstain from alcohol  - Increase omega-3 fatty acids: [place], Trout, Mackerel, Herring, Albacore tuna and supplements  - Attain ideal weight and regular exercise (at least 30 minutes per day of walking)      Elevating HDL (good) cholesterol:  - Increase physical activity  - Seasoned foods with garlic and onions  - Increase omega-3 fatty acids and supplements as listed above  - Incorporating appropriate amounts of monounsaturated fats such as nuts, olive oil, canola oil, avocados, olives  - Stop smoking  - Attain ideal weight and regular exercise (at least 30 minutes per day of walking)

## 2018-05-02 ENCOUNTER — NON-PROVIDER VISIT (OUTPATIENT)
Dept: CARDIOLOGY | Facility: PHYSICIAN GROUP | Age: 73
End: 2018-05-02
Payer: MEDICARE

## 2018-05-02 VITALS
HEART RATE: 70 BPM | BODY MASS INDEX: 33.18 KG/M2 | OXYGEN SATURATION: 90 % | DIASTOLIC BLOOD PRESSURE: 70 MMHG | SYSTOLIC BLOOD PRESSURE: 110 MMHG | WEIGHT: 245 LBS | HEIGHT: 72 IN

## 2018-05-02 DIAGNOSIS — I25.83 CORONARY ARTERY DISEASE DUE TO LIPID RICH PLAQUE: ICD-10-CM

## 2018-05-02 DIAGNOSIS — I48.19 PERSISTENT ATRIAL FIBRILLATION (HCC): ICD-10-CM

## 2018-05-02 DIAGNOSIS — I25.5 ISCHEMIC CARDIOMYOPATHY: Chronic | ICD-10-CM

## 2018-05-02 DIAGNOSIS — Z95.810 PRESENCE OF BIVENTRICULAR AICD: ICD-10-CM

## 2018-05-02 DIAGNOSIS — I25.10 CORONARY ARTERY DISEASE DUE TO LIPID RICH PLAQUE: ICD-10-CM

## 2018-05-02 PROCEDURE — 93289 INTERROG DEVICE EVAL HEART: CPT | Performed by: NURSE PRACTITIONER

## 2018-05-02 NOTE — PROGRESS NOTES
Patient does not some mild diaphragmatic stimulation when he lies on his left side, but it is not bothersome, and goes away when he lies on his back or right side.    Device is working normally.  No device therapy.  Underlying rhythm is chronic atrial fibrillation (known); he is anticoagulated with Coumadin.  Stabke sensing of RA and RV leads; stable capture of RV and LV leads; stable impedances. Battery charge time is 9.4 seconds; battery longevity is 4.8 years.  Decreased LV output just slightly, to help with diaphragmatic stimulation.    FU in 6 months for next AICD check with me. He will be due to see Dr. Rush in 1 year.    Collaborating MD: CARLTON Rivas

## 2018-05-04 LAB — INR PPP: 3.2 (ref 2–3.5)

## 2018-05-07 ENCOUNTER — ANTICOAGULATION MONITORING (OUTPATIENT)
Dept: VASCULAR LAB | Facility: MEDICAL CENTER | Age: 73
End: 2018-05-07

## 2018-05-07 DIAGNOSIS — Z79.01 ANTICOAGULANT LONG-TERM USE: ICD-10-CM

## 2018-05-07 DIAGNOSIS — I48.4 ATYPICAL ATRIAL FLUTTER (HCC): ICD-10-CM

## 2018-05-07 DIAGNOSIS — I48.19 PERSISTENT ATRIAL FIBRILLATION (HCC): ICD-10-CM

## 2018-05-14 DIAGNOSIS — I25.83 CORONARY ARTERY DISEASE DUE TO LIPID RICH PLAQUE: ICD-10-CM

## 2018-05-14 DIAGNOSIS — I25.5 CARDIOMYOPATHY, ISCHEMIC: ICD-10-CM

## 2018-05-14 DIAGNOSIS — I25.10 CORONARY ARTERY DISEASE DUE TO LIPID RICH PLAQUE: ICD-10-CM

## 2018-05-14 RX ORDER — LISINOPRIL 20 MG/1
20 TABLET ORAL DAILY
Qty: 90 TAB | Refills: 3 | Status: SHIPPED | OUTPATIENT
Start: 2018-05-14 | End: 2019-04-25 | Stop reason: SDUPTHER

## 2018-05-21 ENCOUNTER — ANTICOAGULATION MONITORING (OUTPATIENT)
Dept: VASCULAR LAB | Facility: MEDICAL CENTER | Age: 73
End: 2018-05-21

## 2018-05-21 DIAGNOSIS — I48.19 PERSISTENT ATRIAL FIBRILLATION (HCC): ICD-10-CM

## 2018-05-21 DIAGNOSIS — Z79.01 ANTICOAGULANT LONG-TERM USE: ICD-10-CM

## 2018-05-21 DIAGNOSIS — I48.4 ATYPICAL ATRIAL FLUTTER (HCC): ICD-10-CM

## 2018-05-21 LAB — INR PPP: 3 (ref 2–3.5)

## 2018-05-22 NOTE — PROGRESS NOTES
OP Anticoagulation Telephone Note    Date: 5/21/2018  Anticoagulation Summary  As of 5/21/2018    INR goal:   2.0-3.0   TTR:   42.6 % (2.2 y)   Today's INR:   3.0   Warfarin maintenance plan:   5 mg (5 mg x 1) on Mon; 2.5 mg (5 mg x 0.5) all other days   Weekly warfarin total:   20 mg   No change documented:   Viktoria Chaidez, Med Ass't   Plan last modified:   Alley Alcala, PharmD (2/5/2018)   Next INR check:   6/4/2018   Target end date:   Indefinite    Indications    Anticoagulant long-term use [Z79.01]  Atypical atrial flutter (HCC) [I48.4]  Persistent atrial fibrillation (HCC) [I48.1]             Anticoagulation Episode Summary     INR check location:   Outside Lab    Preferred lab:       Send INR reminders to:       Comments:   Alere      Anticoagulation Care Providers     Provider Role Specialty Phone number    Zain Winslow M.D. Referring Cardiac Electrophysiology [phone]    Cherelle Nixon, AnnaD Responsible          Anticoagulation Patient Findings  Patient Findings     Negatives:   Signs/symptoms of thrombosis, Signs/symptoms of bleeding, Laboratory test error suspected, Change in health, Change in alcohol use, Change in activity, Upcoming invasive procedure, Emergency department visit, Upcoming dental procedure, Missed doses, Extra doses, Change in medications, Change in diet/appetite, Hospital admission, Bruising, Other complaints      Plan:  Spoke with patient on the phone. Patient is therapeutic today. Patient denies any changes in medications or diet. Patient denies any signs or symptoms of bleeding or clotting. Instructed patient to call clinic if any unusual bleeding or bruising occurs. Will continue dosing as outlined above. Will follow-up with patient in 2 weeks.    Viktoria Chaidez, Medical Assistant    I have reviewed and am in agreement with the above stated plan on 5-22-18.  Rafat Marcial, PharmD

## 2018-05-29 ENCOUNTER — TELEPHONE (OUTPATIENT)
Dept: VASCULAR LAB | Facility: MEDICAL CENTER | Age: 73
End: 2018-05-29

## 2018-05-29 NOTE — TELEPHONE ENCOUNTER
Faxed order for INR test strips to [facility], per pt request.    LYNN Low  [facility]

## 2018-06-05 ENCOUNTER — ANTICOAGULATION MONITORING (OUTPATIENT)
Dept: VASCULAR LAB | Facility: MEDICAL CENTER | Age: 73
End: 2018-06-05

## 2018-06-05 DIAGNOSIS — I48.19 PERSISTENT ATRIAL FIBRILLATION (HCC): ICD-10-CM

## 2018-06-05 DIAGNOSIS — I48.4 ATYPICAL ATRIAL FLUTTER (HCC): ICD-10-CM

## 2018-06-05 DIAGNOSIS — Z79.01 ANTICOAGULANT LONG-TERM USE: ICD-10-CM

## 2018-06-05 LAB — INR PPP: 2.2 (ref 2–3.5)

## 2018-06-06 NOTE — PROGRESS NOTES
OP Anticoagulation Telephone Note    Date: 6/5/2018  Anticoagulation Summary  As of 6/5/2018    INR goal:   2.0-3.0   TTR:   43.6 % (2.2 y)   Today's INR:   2.2   Warfarin maintenance plan:   5 mg (5 mg x 1) on Mon; 2.5 mg (5 mg x 0.5) all other days   Weekly warfarin total:   20 mg   No change documented:   Scott Shook Ass't   Plan last modified:   Anna JeffersD (2/5/2018)   Next INR check:   6/18/2018   Target end date:   Indefinite    Indications    Anticoagulant long-term use [Z79.01]  Atypical atrial flutter (HCC) [I48.4]  Persistent atrial fibrillation (HCC) [I48.1]             Anticoagulation Episode Summary     INR check location:   Outside Lab    Preferred lab:       Send INR reminders to:       Comments:   Alere      Anticoagulation Care Providers     Provider Role Specialty Phone number    Zain Winslow M.D. Referring Cardiac Electrophysiology [phone]    Cherelle Nixon PharmD Responsible          Anticoagulation Patient Findings  Patient Findings     Negatives:   Signs/symptoms of thrombosis, Signs/symptoms of bleeding, Laboratory test error suspected, Change in health, Change in alcohol use, Change in activity, Upcoming invasive procedure, Emergency department visit, Upcoming dental procedure, Missed doses, Extra doses, Change in medications, Change in diet/appetite, Hospital admission, Bruising, Other complaints      Plan:  Spoke with patient on the phone. Patient is therapeutic today. Patient denies any changes in medications or diet. Patient denies any signs or symptoms of bleeding or clotting. Instructed patient to call clinic if any unusual bleeding or bruising occurs. Will continue dosing as outlined above. Will follow-up with patient in 2 weeks.    Viktoria Chaidez, Medical Assistant      I have reviewed and agree with the plan above on 06/05/2018      Cherelle Nixon, Mikal

## 2018-06-07 ENCOUNTER — OFFICE VISIT (OUTPATIENT)
Dept: ENDOCRINOLOGY | Facility: MEDICAL CENTER | Age: 73
End: 2018-06-07
Payer: MEDICARE

## 2018-06-07 VITALS
WEIGHT: 241.6 LBS | OXYGEN SATURATION: 94 % | HEART RATE: 72 BPM | DIASTOLIC BLOOD PRESSURE: 64 MMHG | BODY MASS INDEX: 32.77 KG/M2 | SYSTOLIC BLOOD PRESSURE: 108 MMHG

## 2018-06-07 DIAGNOSIS — I10 ESSENTIAL HYPERTENSION: ICD-10-CM

## 2018-06-07 DIAGNOSIS — E78.2 MIXED HYPERLIPIDEMIA: ICD-10-CM

## 2018-06-07 DIAGNOSIS — E11.65 TYPE 2 DIABETES MELLITUS WITH HYPERGLYCEMIA, WITH LONG-TERM CURRENT USE OF INSULIN (HCC): ICD-10-CM

## 2018-06-07 DIAGNOSIS — E03.9 ACQUIRED HYPOTHYROIDISM: ICD-10-CM

## 2018-06-07 DIAGNOSIS — Z79.4 TYPE 2 DIABETES MELLITUS WITH HYPERGLYCEMIA, WITH LONG-TERM CURRENT USE OF INSULIN (HCC): ICD-10-CM

## 2018-06-07 LAB
HBA1C MFR BLD: 8.3 % (ref ?–5.8)
INT CON NEG: NORMAL
INT CON POS: NORMAL

## 2018-06-07 PROCEDURE — 99214 OFFICE O/P EST MOD 30 MIN: CPT | Performed by: INTERNAL MEDICINE

## 2018-06-07 PROCEDURE — 83036 HEMOGLOBIN GLYCOSYLATED A1C: CPT | Performed by: INTERNAL MEDICINE

## 2018-06-07 RX ORDER — FLASH GLUCOSE SENSOR
1 KIT MISCELLANEOUS
Qty: 3 EACH | Refills: 6 | Status: SHIPPED | OUTPATIENT
Start: 2018-06-07 | End: 2019-03-18 | Stop reason: SDUPTHER

## 2018-06-07 RX ORDER — FLASH GLUCOSE SENSOR
1 KIT MISCELLANEOUS ONCE
Qty: 1 DEVICE | Refills: 0 | Status: SHIPPED | OUTPATIENT
Start: 2018-06-07 | End: 2018-06-07

## 2018-06-07 NOTE — PROGRESS NOTES
"RN-CDE Note    Subjective:     Health changes since last visit/interval Hx: having more issues with shortness of breath, needing to use O2 on a continuous basis.     Medications (including changes made today)  Current Outpatient Prescriptions   Medication Sig Dispense Refill   • lisinopril (PRINIVIL) 20 MG Tab Take 1 Tab by mouth every day. 90 Tab 3   • metformin (GLUCOPHAGE) 1000 MG tablet take 1 tablet by mouth twice a day WITH A MEAL 180 Tab 3   • furosemide (LASIX) 40 MG Tab TAKE ONE TABLET BY MOUTH ONCE DAILY AS NEEDED 90 Tab 0   • rosuvastatin (CRESTOR) 40 MG tablet Take 1 Tab by mouth every day. 90 Tab 1   • insulin NPH (NOVOLIN N RELION) 100 UNIT/ML Suspension Inject 20 Units as instructed 2 Times a Day. (Patient taking differently: Inject 44 Units as instructed Once.) 20 mL 6   • levothyroxine (SYNTHROID) 112 MCG Tab TAKE ONE TABLET BY MOUTH IN THE MORNING ON EMPTY STOMACH 90 Tab 3   • insulin regular (NOVOLIN R RELION) 100 Unit/mL Solution Inject 28 Units as instructed 3 times a day before meals. (Patient taking differently: Inject 29 Units as instructed 3 times a day before meals.) 20 mL 1   • warfarin (COUMADIN) 5 MG Tab Take one-half to one tablet by mouth one time daily as directed by anticoagulation clinic 90 Tab 1   • carvedilol (COREG) 3.125 MG Tab Take 1 Tab by mouth 2 times a day, with meals. 180 Tab 3   • FREESTYLE LITE strip TEST BLOOD SUGAR 4 TO 5 TIMES DAILY AS DIRECTED 500 Strip 2   • Insulin Syringe-Needle U-100 31G X 3/8\" 0.5 ML Misc Using 5 per day with insulin 400 Each 2   • Blood Glucose Monitoring Suppl SUPPLIES [facility] Freestyle Freedome Lite test strips. Use to test blood sugars 4-5 times daily as directed. Dx: ICD-10. E11.9, Z74.9 500 Strip 3     No current facility-administered medications for this visit.        Taking daily ASA: No, on warfarin  Taking above medications as prescribed: Yes  Patient Denies side effects of medication.    Exercise: no regular exercise, sedentary  Diet: " "\"healthy\" diet  in general  meals per day on average: 3    Patient's body mass index is 32.77 kg/m². Exercise and nutrition counseling were performed at this visit.      Health Maintenance:   Health Maintenance Topics with due status: Overdue       Topic Date Due    IMM DTaP/Tdap/Td Vaccine 09/08/1964    URINE ACR / MICROALBUMIN 10/11/2017           DM:   Last A1c:   Lab Results   Component Value Date/Time    HBA1C 8.3 06/07/2018 12:16 PM      A1c goal: < 7    Glucose monitoring frequency: 4 times per day      Hypoglycemic episodes: yes - rare.      Last Retinal Exam: 4/9/18  Daily Foot Exam: yes , had infected ingrown toenail removed on 6/6/18  Routine Dental Exams: yes    Lab Results   Component Value Date/Time    MALBCRT 20 10/11/2016 10:00 AM    MICROALBUR 33.2 (H) 10/11/2016 10:00 AM        ACR Albumin/Creatinine Ratio goal <30       HTN:   Blood pressure goal <140/<80 .   Currently Rx ACE/ARB: Yes    Dyslipidemia:    Lab Results   Component Value Date/Time    CHOLSTRLTOT 261 (H) 02/21/2018 08:40 AM     (H) 02/21/2018 08:40 AM    HDL 37.0 (L) 02/21/2018 08:40 AM    TRIGLYCERIDE 359 (H) 02/21/2018 08:40 AM       Lab Results   Component Value Date/Time    SODIUM 141 10/21/2017 09:07 AM    POTASSIUM 4.8 10/21/2017 09:07 AM    CHLORIDE 106 10/21/2017 09:07 AM    CO2 26 10/21/2017 09:07 AM    GLUCOSE 255 (H) 10/21/2017 09:07 AM    BUN 18 10/21/2017 09:07 AM    CREATININE 1.1 10/21/2017 09:07 AM     Lab Results   Component Value Date/Time    ALKPHOSPHAT 52 10/21/2017 09:07 AM    ASTSGOT 31 10/21/2017 09:07 AM    ALTSGPT 27 10/21/2017 09:07 AM    TBILIRUBIN 1.4 (H) 10/21/2017 09:07 AM        Currently Rx Statin: Yes      He  reports that he quit smoking about 5 years ago. His smoking use included Cigarettes. He has a 25.00 pack-year smoking history. He has never used smokeless tobacco.    Objective:     Exam:  Monofilament: not done        Plan:     Discussed All medications, side effects and compliance " (discussed carefully)  Annual eye examinations at Ophthalmology  Diabetic diet discussed in detail, written exchange diet given  Foot care discussed and Podiatry visits  Glycohemoglobin and other lab monitoring  Home glucose monitoring emphasized.             Recommended medication changes: increase regular insulin to 29 units with meals.

## 2018-06-07 NOTE — PROGRESS NOTES
Endocrinology Clinic Progress Note    CC: Diabetes    HPI:  1. Type 2 diabetes mellitus with hyperglycemia, with long-term current use of insulin (HCC)  He is currently on NPH 44 units at bedtime and regular insulin 28 units 3 times a day with meals. Also on metformin 1000 mg twice a day. He checks blood sugars 4 times per day. Fasting blood sugar is well controlled. Pre-meal blood sugar readings are slightly higher than goal. No recent hypoglycemia.    2. Mixed hyperlipidemia  He is currently on Crestor, tolerating well.    3. Essential hypertension  He is currently on lisinopril. Reports compliance with medications. Blood pressure is well controlled.    4. Acquired hypothyroidism  He is currently on levothyroxine 112 µg daily. Overall feels well.    ROS:  Constitutional: Negative for unintentional weight loss  Cardiac: Negative for palpitations    PMH:  Patient Active Problem List   Diagnosis   • Coronary artery disease due to lipid rich plaque   • Essential hypertension   • Type 2 diabetes mellitus without complication ([facility])   • Dyslipidemia   • Ischemic cardiomyopathy   • Hypertriglyceridemia   • Sleep apnea   • Prostate cancer screening   • Colon cancer screening   • ED (erectile dysfunction)   • Depression   • Hypothyroid   • Pulmonary nodule   • CHF (congestive heart failure), NYHA class II, chronic, systolic (CMS-[facility]) EF 40-45% 2017   • Chronic paroxysmal hemicrania, not intractable   • Persistent atrial fibrillation (HCC)   • Atypical atrial flutter (HCC)   • Anticoagulant long-term use   • Presence of biventricular AICD   • Chronic obstructive pulmonary disease with acute exacerbation ([facility])   • History of coronary artery stent placement ~2001     EXAM:  Vital signs: /64   Pulse 72   Wt 109.6 kg (241 lb 9.6 oz)   SpO2 94%   BMI 32.77 kg/m²   General: No apparent distress, cooperative  Eyes: No scleral icterus, no discharge  Neck: Normal on external inspection  Resp: Normal effort  Psych: Alert and  oriented, normal mood and affect    Assessment and Plan:    1. Type 2 diabetes mellitus with hyperglycemia, with long-term current use of insulin (HCC)  · Hemoglobin A1c today in the clinic is 8.3%   · Goal A1c less than 7.5%  · Continue NPH 44 units at bedtime  · Increased regular insulin to 30 units 3 times a day with meals placement plus mid dose correction factor  - POCT Hemoglobin A1C  - Continuous Blood Gluc  (FREESTYLE ERCIKA READER) Device; 1 Each by Does not apply route Once for 1 dose.  Dispense: 1 Device; Refill: 0  - Continuous Blood Gluc Sensor (FREESTYLE ERICKA SENSOR SYSTEM) Misc; 1 Each by Does not apply route every 10 days.  Dispense: 3 Each; Refill: 6    2. Mixed hyperlipidemia  ·   · Continue Crestor    3. Essential hypertension  · Blood pressure is well controlled  · Continue lisinopril    4. Acquired hypothyroidism  · Continue levothyroxine 112 µg daily    Return in about 3 months (around 9/7/2018).    Thank you for allowing me to participate in the care of this patient.    Indira Herrera M.D.    CC:   Thomas Zhang M.D.    This note was created using voice recognition software (Dragon). The accuracy of the dictation is limited by the abilities of the software. I have reviewed the note prior to signing, however some errors in grammar and context are still possible. If you have any questions related to this note please do not hesitate to contact our office.

## 2018-06-13 DIAGNOSIS — E11.65 TYPE 2 DIABETES MELLITUS WITH HYPERGLYCEMIA, WITH LONG-TERM CURRENT USE OF INSULIN (HCC): ICD-10-CM

## 2018-06-13 DIAGNOSIS — Z79.4 TYPE 2 DIABETES MELLITUS WITH HYPERGLYCEMIA, WITH LONG-TERM CURRENT USE OF INSULIN (HCC): ICD-10-CM

## 2018-06-15 ENCOUNTER — ANTICOAGULATION MONITORING (OUTPATIENT)
Dept: VASCULAR LAB | Facility: MEDICAL CENTER | Age: 73
End: 2018-06-15

## 2018-06-15 DIAGNOSIS — I48.4 ATYPICAL ATRIAL FLUTTER (HCC): ICD-10-CM

## 2018-06-15 DIAGNOSIS — Z79.01 ANTICOAGULANT LONG-TERM USE: ICD-10-CM

## 2018-06-15 DIAGNOSIS — I48.19 PERSISTENT ATRIAL FIBRILLATION (HCC): ICD-10-CM

## 2018-06-15 LAB — INR PPP: 3.1 (ref 2–3.5)

## 2018-06-15 NOTE — PROGRESS NOTES
Anticoagulation Summary  As of 6/15/2018    INR goal:   2.0-3.0   TTR:   44.2 % (2.3 y)   Today's INR:   3.1!   Warfarin maintenance plan:   5 mg (5 mg x 1) on Mon; 2.5 mg (5 mg x 0.5) all other days   Weekly warfarin total:   20 mg   Plan last modified:   Alley Alcala PharmD (2/5/2018)   Next INR check:   6/29/2018   Target end date:   Indefinite    Indications    Anticoagulant long-term use [Z79.01]  Atypical atrial flutter (HCC) [I48.4]  Persistent atrial fibrillation (HCC) [I48.1]             Anticoagulation Episode Summary     INR check location:   Outside Lab    Preferred lab:       Send INR reminders to:       Comments:   Alere      Anticoagulation Care Providers     Provider Role Specialty Phone number    Zain Winslow M.D. Referring Cardiac Electrophysiology [phone]    Cherelle Nixon PharmD Responsible          Anticoagulation Patient Findings      Spoke with patient.  INR is slightly SUPRA therapeutic.   Pt denies any unusual s/s of bleeding, bruising, clotting or any changes to diet or medications. Denies any etoh, cranberries, supplements, or illness.   Pt verifies warfarin weekly dosing.     Will have pt continue with his same warfarin dosing and eat a little more greens.     Repeat INR in 2 week(s).     Alley Alcala, PharmD

## 2018-06-28 ENCOUNTER — TELEPHONE (OUTPATIENT)
Dept: ENDOCRINOLOGY | Facility: MEDICAL CENTER | Age: 73
End: 2018-06-28

## 2018-06-28 NOTE — TELEPHONE ENCOUNTER
Pt has been testing blood sugars with freestyle jonas and like 3 other meters at home with all different readings that range from 94 to 247. Jonas is the one that pt is currently using but is always in the 90's. Please advise.

## 2018-06-28 NOTE — TELEPHONE ENCOUNTER
He can come to our clinic with his FreeStyle Jonas, and we will check his blood sugar with his FreeStyle jonas and our glucometer.

## 2018-06-29 ENCOUNTER — NON-PROVIDER VISIT (OUTPATIENT)
Dept: ENDOCRINOLOGY | Facility: MEDICAL CENTER | Age: 73
End: 2018-06-29
Payer: MEDICARE

## 2018-06-29 DIAGNOSIS — E11.65 TYPE 2 DIABETES MELLITUS WITH HYPERGLYCEMIA, WITH LONG-TERM CURRENT USE OF INSULIN (HCC): ICD-10-CM

## 2018-06-29 DIAGNOSIS — Z79.4 TYPE 2 DIABETES MELLITUS WITH HYPERGLYCEMIA, WITH LONG-TERM CURRENT USE OF INSULIN (HCC): ICD-10-CM

## 2018-06-29 LAB
GLUCOSE BLD-MCNC: 168 MG/DL (ref 70–100)
INR PPP: 2.5 (ref 2–3.5)

## 2018-06-29 PROCEDURE — 82962 GLUCOSE BLOOD TEST: CPT | Performed by: INTERNAL MEDICINE

## 2018-07-02 ENCOUNTER — ANTICOAGULATION MONITORING (OUTPATIENT)
Dept: MEDICAL GROUP | Facility: PHYSICIAN GROUP | Age: 73
End: 2018-07-02

## 2018-07-02 DIAGNOSIS — I48.4 ATYPICAL ATRIAL FLUTTER (HCC): ICD-10-CM

## 2018-07-02 DIAGNOSIS — I48.19 PERSISTENT ATRIAL FIBRILLATION (HCC): ICD-10-CM

## 2018-07-02 DIAGNOSIS — Z79.01 ANTICOAGULANT LONG-TERM USE: ICD-10-CM

## 2018-07-02 DIAGNOSIS — E10.37X9: ICD-10-CM

## 2018-07-02 RX ORDER — FUROSEMIDE 40 MG/1
TABLET ORAL
Qty: 90 TAB | Refills: 2 | Status: SHIPPED | OUTPATIENT
Start: 2018-07-02 | End: 2019-06-04 | Stop reason: SDUPTHER

## 2018-07-02 NOTE — PROGRESS NOTES
Anticoagulation Summary  As of 7/2/2018    INR goal:   2.0-3.0   TTR:   44.8 % (2.3 y)   Today's INR:   2.5 (6/29/2018)   Warfarin maintenance plan:   5 mg (5 mg x 1) on Mon; 2.5 mg (5 mg x 0.5) all other days   Weekly warfarin total:   20 mg   Plan last modified:   Anna JeffersD (2/5/2018)   Next INR check:   7/13/2018   Target end date:   Indefinite    Indications    Anticoagulant long-term use [Z79.01]  Atypical atrial flutter (HCC) [I48.4]  Persistent atrial fibrillation (HCC) [I48.1]             Anticoagulation Episode Summary     INR check location:   Outside Lab    Preferred lab:       Send INR reminders to:       Comments:   Jessica      Anticoagulation Care Providers     Provider Role Specialty Phone number    Zain Winslow M.D. Referring Cardiac Electrophysiology [phone]    Anna LimaD Responsible          Anticoagulation Patient Findings  Patient Findings     Negatives:   Signs/symptoms of thrombosis, Signs/symptoms of bleeding, Laboratory test error suspected, Change in health, Change in alcohol use, Change in activity, Upcoming invasive procedure, Emergency department visit, Upcoming dental procedure, Missed doses, Extra doses, Change in medications, Change in diet/appetite, Hospital admission, Bruising, Other complaints        Spoke with patient today regarding therapeutic INR of 2.5.  Patient denies any signs/symptoms of bruising or bleeding or any changes in diet and medications.  Instructed patient to call clinic with any questions or concerns.  Pt is to continue with current warfarin dosing regimen.  Follow up in 2 weeks, to reduce risk of adverse events related to this high risk medication,  Warfarin.    Rafat Marcial, PharmD

## 2018-07-14 LAB — INR PPP: 3.7 (ref 2–3.5)

## 2018-07-16 ENCOUNTER — ANTICOAGULATION MONITORING (OUTPATIENT)
Dept: VASCULAR LAB | Facility: MEDICAL CENTER | Age: 73
End: 2018-07-16

## 2018-07-16 DIAGNOSIS — I48.19 PERSISTENT ATRIAL FIBRILLATION (HCC): ICD-10-CM

## 2018-07-16 DIAGNOSIS — I48.4 ATYPICAL ATRIAL FLUTTER (HCC): ICD-10-CM

## 2018-07-16 DIAGNOSIS — Z79.01 ANTICOAGULANT LONG-TERM USE: ICD-10-CM

## 2018-07-16 NOTE — PROGRESS NOTES
Anticoagulation Summary  As of 7/16/2018    INR goal:   2.0-3.0   TTR:   44.8 % (2.3 y)   Today's INR:   3.7! (7/14/2018)   Warfarin maintenance plan:   5 mg (5 mg x 1) on Mon; 2.5 mg (5 mg x 0.5) all other days   Weekly warfarin total:   20 mg   Plan last modified:   Anna JeffersD (2/5/2018)   Next INR check:   7/30/2018   Target end date:   Indefinite    Indications    Anticoagulant long-term use [Z79.01]  Atypical atrial flutter (HCC) [I48.4]  Persistent atrial fibrillation (HCC) [I48.1]             Anticoagulation Episode Summary     INR check location:   Outside Lab    Preferred lab:       Send INR reminders to:       Comments:   eJssica      Anticoagulation Care Providers     Provider Role Specialty Phone number    Zain Winslow M.D. Referring Cardiac Electrophysiology [phone]    Anna LimaD Responsible          Anticoagulation Patient Findings    Left a message on the patient's voicemail today, informing the patient of a SUPRA-therapeutic INR of 3.7. Instructed patient to call the clinic with any changes to diet or medications, with any questions/concerns, or with any signs/sx of bleeding or clotting.    Instructed the patient to decrease dose to 2.5mg TONIGHT ONLY, then to resume his current regimen thereafter. Patient asked to follow up again in 2 weeks.    Jose Antonio CastilloD

## 2018-07-27 LAB — INR PPP: 2.1 (ref 2–3.5)

## 2018-07-30 ENCOUNTER — ANTICOAGULATION MONITORING (OUTPATIENT)
Dept: VASCULAR LAB | Facility: MEDICAL CENTER | Age: 73
End: 2018-07-30

## 2018-07-30 DIAGNOSIS — I48.19 PERSISTENT ATRIAL FIBRILLATION (HCC): ICD-10-CM

## 2018-07-30 DIAGNOSIS — I48.4 ATYPICAL ATRIAL FLUTTER (HCC): ICD-10-CM

## 2018-07-30 DIAGNOSIS — Z79.01 ANTICOAGULANT LONG-TERM USE: ICD-10-CM

## 2018-07-30 LAB — INR PPP: 2.1 (ref 2–3.5)

## 2018-07-30 NOTE — PROGRESS NOTES
OP Anticoagulation Telephone Note    Date: 7/30/2018  Anticoagulation Summary  As of 7/30/2018    INR goal:   2.0-3.0   TTR:   45.1 % (2.4 y)   Today's INR:   2.1   Warfarin maintenance plan:   5 mg (5 mg x 1) on Mon; 2.5 mg (5 mg x 0.5) all other days   Weekly warfarin total:   20 mg   No change documented:   Viktoria Chaidez, Med Ass't   Plan last modified:   Alley Alcala, PharmD (2/5/2018)   Next INR check:   8/13/2018   Target end date:   Indefinite    Indications    Anticoagulant long-term use [Z79.01]  Atypical atrial flutter (HCC) [I48.4]  Persistent atrial fibrillation (HCC) [I48.1]             Anticoagulation Episode Summary     INR check location:   Outside Lab    Preferred lab:       Send INR reminders to:       Comments:   Alere      Anticoagulation Care Providers     Provider Role Specialty Phone number    Zain Winslow M.D. Referring Cardiac Electrophysiology [phone]    Cherelle Nixon, PharmD Responsible          Anticoagulation Patient Findings  Patient Findings     Positives:   Change in medications    Negatives:   Signs/symptoms of thrombosis, Signs/symptoms of bleeding, Laboratory test error suspected, Change in health, Change in alcohol use, Change in activity, Upcoming invasive procedure, Emergency department visit, Upcoming dental procedure, Missed doses, Extra doses, Change in diet/appetite, Hospital admission, Bruising, Other complaints    Comments:   Prednisone since 07/26/2018 ends on 08/01/2018      Plan:  Spoke with patient on the phone. Patient is therapeutic today. Patient denies any changes in his diet, but he did start Prednisone on 07/26/2018 and will stop it on 08/01/2018 I did talk with Alley and she said if he is in range after taking it this long he should be okay to check in a couple weeks as usual. Patient denies any signs or symptoms of bleeding or clotting. Instructed patient to call clinic if any unusual bleeding or bruising occurs. Will continue dosing as outlined  above. Will follow-up with patient in 2 weeks.    Viktoria Chaidez, Medical Assistant

## 2018-08-10 ENCOUNTER — ANTICOAGULATION MONITORING (OUTPATIENT)
Dept: VASCULAR LAB | Facility: MEDICAL CENTER | Age: 73
End: 2018-08-10

## 2018-08-10 DIAGNOSIS — I48.19 PERSISTENT ATRIAL FIBRILLATION (HCC): ICD-10-CM

## 2018-08-10 DIAGNOSIS — Z79.01 ANTICOAGULANT LONG-TERM USE: ICD-10-CM

## 2018-08-10 DIAGNOSIS — I48.4 ATYPICAL ATRIAL FLUTTER (HCC): ICD-10-CM

## 2018-08-10 LAB — INR PPP: 3.6 (ref 2–3.5)

## 2018-08-10 NOTE — PROGRESS NOTES
Anticoagulation Summary  As of 8/10/2018    INR goal:   2.0-3.0   TTR:   45.3 % (2.4 y)   Today's INR:   3.6!   Warfarin maintenance plan:   5 mg (5 mg x 1) on Mon; 2.5 mg (5 mg x 0.5) all other days   Weekly warfarin total:   20 mg   Plan last modified:   Anna JeffersD (2/5/2018)   Next INR check:   8/17/2018   Target end date:   Indefinite    Indications    Anticoagulant long-term use [Z79.01]  Atypical atrial flutter (HCC) [I48.4]  Persistent atrial fibrillation (HCC) [I48.1]             Anticoagulation Episode Summary     INR check location:   Outside Lab    Preferred lab:       Send INR reminders to:       Comments:   Alere      Anticoagulation Care Providers     Provider Role Specialty Phone number    Zain Winslow M.D. Referring Cardiac Electrophysiology [phone]    Anna LimaD Responsible          Anticoagulation Patient Findings    Spoke to patient on the phone. Patients INR was supratherapeutic today at 3.6 .Patient denied any signs/symptoms of bleeding or bruising. Patient denied any recent changes to medications or diet. Paitent was instructed to hold warfarin tomorrow. Follow up in 1 week(s).    Anna Murillo.D

## 2018-08-20 ENCOUNTER — ANTICOAGULATION MONITORING (OUTPATIENT)
Dept: VASCULAR LAB | Facility: MEDICAL CENTER | Age: 73
End: 2018-08-20

## 2018-08-20 DIAGNOSIS — Z79.01 ANTICOAGULANT LONG-TERM USE: ICD-10-CM

## 2018-08-20 DIAGNOSIS — I48.4 ATYPICAL ATRIAL FLUTTER (HCC): ICD-10-CM

## 2018-08-20 DIAGNOSIS — I48.19 PERSISTENT ATRIAL FIBRILLATION (HCC): ICD-10-CM

## 2018-08-20 LAB — INR PPP: 1.9 (ref 2–3.5)

## 2018-08-20 NOTE — PROGRESS NOTES
Anticoagulation Summary  As of 8/20/2018    INR goal:   2.0-3.0   TTR:   45.5 % (2.4 y)   Today's INR:   1.9!   Warfarin maintenance plan:   5 mg (5 mg x 1) on Mon; 2.5 mg (5 mg x 0.5) all other days   Weekly warfarin total:   20 mg   Plan last modified:   Anna JeffersD (2/5/2018)   Next INR check:   8/31/2018   Target end date:   Indefinite    Indications    Anticoagulant long-term use [Z79.01]  Atypical atrial flutter (HCC) [I48.4]  Persistent atrial fibrillation (HCC) [I48.1]             Anticoagulation Episode Summary     INR check location:   Outside Lab    Preferred lab:       Send INR reminders to:       Comments:   Alere      Anticoagulation Care Providers     Provider Role Specialty Phone number    Zain Winslow M.D. Referring Cardiac Electrophysiology [phone]    Anna LimaD Responsible          Anticoagulation Patient Findings  Patient Findings     Negatives:   Signs/symptoms of thrombosis, Signs/symptoms of bleeding, Laboratory test error suspected, Change in health, Change in alcohol use, Change in activity, Upcoming invasive procedure, Emergency department visit, Upcoming dental procedure, Missed doses, Extra doses, Change in medications, Change in diet/appetite, Hospital admission, Bruising, Other complaints        Spoke with the patient on the phone today, reporting a slightly SUB-therapeutic INR of 1.9.  Confirmed the current warfarin dosing regimen and patient compliance. Patient did miss a dose last week. Patient denies any interval changes to diet and/or medications. Patient denies any signs/symptoms of bleeding or clotting.  Patient instructed to continue with the current warfarin dosing regimen, and asked to follow up again in 2 weeks.    Jose Antonio CastilloD

## 2018-09-04 LAB — INR PPP: 2.1 (ref 2–3.5)

## 2018-09-05 ENCOUNTER — ANTICOAGULATION MONITORING (OUTPATIENT)
Dept: VASCULAR LAB | Facility: MEDICAL CENTER | Age: 73
End: 2018-09-05

## 2018-09-05 DIAGNOSIS — I48.19 PERSISTENT ATRIAL FIBRILLATION (HCC): ICD-10-CM

## 2018-09-05 DIAGNOSIS — I48.4 ATYPICAL ATRIAL FLUTTER (HCC): ICD-10-CM

## 2018-09-05 DIAGNOSIS — Z79.01 ANTICOAGULANT LONG-TERM USE: ICD-10-CM

## 2018-09-05 NOTE — PROGRESS NOTES
OP Anticoagulation Telephone Note    Date: 9/5/2018  Anticoagulation Summary  As of 9/5/2018    INR goal:   2.0-3.0   TTR:   45.6 % (2.5 y)   Today's INR:   2.1 (9/4/2018)   Warfarin maintenance plan:   5 mg (5 mg x 1) on Mon; 2.5 mg (5 mg x 0.5) all other days   Weekly warfarin total:   20 mg   No change documented:   Viktoria Chaidez, Med Ass't   Plan last modified:   Alley Alcala, PharmD (2/5/2018)   Next INR check:   9/19/2018   Target end date:   Indefinite    Indications    Anticoagulant long-term use [Z79.01]  Atypical atrial flutter (HCC) [I48.4]  Persistent atrial fibrillation (HCC) [I48.1]             Anticoagulation Episode Summary     INR check location:   Outside Lab    Preferred lab:       Send INR reminders to:       Comments:   Alere      Anticoagulation Care Providers     Provider Role Specialty Phone number    Zain Winslow M.D. Referring Cardiac Electrophysiology [phone]    Anna LimaD Responsible          Anticoagulation Patient Findings  Patient Findings     Negatives:   Signs/symptoms of thrombosis, Signs/symptoms of bleeding, Laboratory test error suspected, Change in health, Change in alcohol use, Change in activity, Upcoming invasive procedure, Emergency department visit, Upcoming dental procedure, Missed doses, Extra doses, Change in medications, Change in diet/appetite, Hospital admission, Bruising, Other complaints      Plan:  Spoke with patient on the phone. Patient is therapeutic today. Patient denies any changes in medications or diet. Patient denies any signs or symptoms of bleeding or clotting. Instructed patient to call clinic if any unusual bleeding or bruising occurs. Will continue dosing as outlined above. Will follow-up with patient in 2 weeks.    Viktoria Chaidez, Medical Assistant    09/05/18  Cosignature - Alley Alcala, PharmD

## 2018-09-06 ENCOUNTER — ANTICOAGULATION MONITORING (OUTPATIENT)
Dept: VASCULAR LAB | Facility: MEDICAL CENTER | Age: 73
End: 2018-09-06

## 2018-09-06 DIAGNOSIS — Z79.01 ANTICOAGULANT LONG-TERM USE: ICD-10-CM

## 2018-09-06 DIAGNOSIS — I48.4 ATYPICAL ATRIAL FLUTTER (HCC): ICD-10-CM

## 2018-09-06 DIAGNOSIS — I48.19 PERSISTENT ATRIAL FIBRILLATION (HCC): ICD-10-CM

## 2018-09-06 LAB — INR PPP: 4 (ref 2–3.5)

## 2018-09-07 ENCOUNTER — OFFICE VISIT (OUTPATIENT)
Dept: ENDOCRINOLOGY | Facility: MEDICAL CENTER | Age: 73
End: 2018-09-07
Payer: MEDICARE

## 2018-09-07 VITALS
OXYGEN SATURATION: 100 % | SYSTOLIC BLOOD PRESSURE: 108 MMHG | HEIGHT: 72 IN | WEIGHT: 243 LBS | DIASTOLIC BLOOD PRESSURE: 62 MMHG | BODY MASS INDEX: 32.91 KG/M2 | HEART RATE: 78 BPM

## 2018-09-07 DIAGNOSIS — E11.65 TYPE 2 DIABETES MELLITUS WITH HYPERGLYCEMIA, WITH LONG-TERM CURRENT USE OF INSULIN (HCC): ICD-10-CM

## 2018-09-07 DIAGNOSIS — E03.9 ACQUIRED HYPOTHYROIDISM: ICD-10-CM

## 2018-09-07 DIAGNOSIS — I10 ESSENTIAL HYPERTENSION: ICD-10-CM

## 2018-09-07 DIAGNOSIS — Z79.4 TYPE 2 DIABETES MELLITUS WITH HYPERGLYCEMIA, WITH LONG-TERM CURRENT USE OF INSULIN (HCC): ICD-10-CM

## 2018-09-07 DIAGNOSIS — E78.2 MIXED HYPERLIPIDEMIA: ICD-10-CM

## 2018-09-07 LAB
HBA1C MFR BLD: 8.5 % (ref ?–5.8)
INT CON NEG: NORMAL
INT CON POS: NORMAL

## 2018-09-07 PROCEDURE — 99214 OFFICE O/P EST MOD 30 MIN: CPT | Mod: 25 | Performed by: INTERNAL MEDICINE

## 2018-09-07 PROCEDURE — 95251 CONT GLUC MNTR ANALYSIS I&R: CPT | Performed by: INTERNAL MEDICINE

## 2018-09-07 PROCEDURE — 83036 HEMOGLOBIN GLYCOSYLATED A1C: CPT | Performed by: INTERNAL MEDICINE

## 2018-09-07 NOTE — PROGRESS NOTES
"RN-CDE Note    Subjective:     Health changes since last visit/interval Hx: None    Medications (including changes made today)  Current Outpatient Prescriptions   Medication Sig Dispense Refill   • furosemide (LASIX) 40 MG Tab TAKE 1 TABLET BY MOUTH ONCE DAILY AS NEEDED 90 Tab 2   • insulin NPH (NOVOLIN N RELION) 100 UNIT/ML Suspension Inject 44 Units as instructed every bedtime. 20 mL 5   • insulin regular (NOVOLIN R RELION) 100 Unit/mL Solution Inject 30-40 Units as instructed 3 times a day before meals. 40 mL 5   • Continuous Blood Gluc Sensor (FREESTYLE ERICKA SENSOR SYSTEM) Misc 1 Each by Does not apply route every 10 days. 3 Each 6   • lisinopril (PRINIVIL) 20 MG Tab Take 1 Tab by mouth every day. 90 Tab 3   • metformin (GLUCOPHAGE) 1000 MG tablet take 1 tablet by mouth twice a day WITH A MEAL 180 Tab 3   • FREESTYLE LITE strip TEST BLOOD SUGAR 4 TO 5 TIMES DAILY AS DIRECTED 500 Strip 2   • rosuvastatin (CRESTOR) 40 MG tablet Take 1 Tab by mouth every day. 90 Tab 1   • levothyroxine (SYNTHROID) 112 MCG Tab TAKE ONE TABLET BY MOUTH IN THE MORNING ON EMPTY STOMACH 90 Tab 3   • warfarin (COUMADIN) 5 MG Tab Take one-half to one tablet by mouth one time daily as directed by anticoagulation clinic 90 Tab 1   • Insulin Syringe-Needle U-100 31G X 3/8\" 0.5 ML Misc Using 5 per day with insulin 400 Each 2   • carvedilol (COREG) 3.125 MG Tab Take 1 Tab by mouth 2 times a day, with meals. 180 Tab 3   • Blood Glucose Monitoring Suppl SUPPLIES Misc Freestyle Freedome Lite test strips. Use to test blood sugars 4-5 times daily as directed. Dx: ICD-10. E11.9, Z74.9 500 Strip 3     No current facility-administered medications for this visit.        Taking daily ASA: Not Indicated  Taking above medications as prescribed: yes  SIDE EFFECTS: Patient denies side effects to medications    Exercise: sporadic irregular exercise, <half hour walking weekly  Diet: \"healthy\" diet  in general  Patient's body mass index is unknown because " there is no height or weight on file. Exercise and nutrition counseling were performed at this visit.      Health Maintenance:   Health Maintenance Due   Topic Date Due   • IMM HEP B VACCINE (1 of 3 - Risk 3-dose series) 09/08/1964   • IMM DTaP/Tdap/Td Vaccine (1 - Tdap) 09/08/1964   • IMM ZOSTER VACCINES (2 of 3) 07/11/2007   • IMM INFLUENZA (1) 09/01/2018           DM:   Last A1c:   Lab Results   Component Value Date/Time    HBA1C 8.3 06/07/2018 12:16 PM      A1C GOAL: < 7.5    Glucose monitoring frequency:  Using the Jonas  See download in media  Hypoglycemic episodes: no    Last Retinal Exam: on file and up-to-date  Daily Foot Exam: Yes   Routine Dental Exams: Yes    Lab Results   Component Value Date/Time    MALBCRT 85 (H) 07/12/2018 08:35 AM    MICROALBUR 97.8 (H) 07/12/2018 08:35 AM        ACR Albumin/Creatinine Ratio goal <30     HTN:   Blood pressure goal <140/<80 .   Currently Rx ACE/ARB: Yes    Dyslipidemia:    Lab Results   Component Value Date/Time    CHOLSTRLTOT 161 07/12/2018 08:35 AM    LDL 74 07/12/2018 08:35 AM    HDL 38.0 (L) 07/12/2018 08:35 AM    TRIGLYCERIDE 243 (H) 07/12/2018 08:35 AM       Lab Results   Component Value Date/Time    SODIUM 142 07/12/2018 08:35 AM    POTASSIUM 4.5 07/12/2018 08:35 AM    CHLORIDE 103 07/12/2018 08:35 AM    CO2 31 07/12/2018 08:35 AM    GLUCOSE 216 (H) 07/12/2018 08:35 AM    BUN 22 (H) 07/12/2018 08:35 AM    CREATININE 1.3 07/12/2018 08:35 AM     Lab Results   Component Value Date/Time    ALKPHOSPHAT 46 07/12/2018 08:35 AM    ASTSGOT 34 07/12/2018 08:35 AM    ALTSGPT 35 07/12/2018 08:35 AM    TBILIRUBIN 0.9 07/12/2018 08:35 AM        Currently Rx Statin: Yes    He  reports that he quit smoking about 5 years ago. His smoking use included Cigarettes. He has a 25.00 pack-year smoking history. He has never used smokeless tobacco.    Objective:     Exam:  Monofilament: not done    Plan:     Discussed and educated on:   - All medications, side effects and compliance  (discussed carefully)  - Annual eye examinations at Ophthalmology  - HbA1C: target  - Home glucose monitoring emphasized  - Weight control and daily exercise    Recommended medication changes: increase Regular insulin to 32 units with lunch and dinner  Patient interested in Victoza

## 2018-09-07 NOTE — PROGRESS NOTES
Endocrinology Clinic Progress Note    CC: Diabetes    HPI:  1. Type 2 diabetes mellitus with hyperglycemia, with long-term current use of insulin (HCC)  He is currently on NPH 44 units at bedtime and regular insulin 30 units 3 times a day with meals.  He is on NPH and regular insulin due to very high co-pay for new or insulins.  Also on metformin 1000 mg twice a day.  He has CGM as well.  CGM tracings reviewed.  Fasting blood sugars well controlled.  Blood sugars after breakfast are slightly higher than goal, however blood sugars after lunch and dinner are much higher than goal.  No hypoglycemia.    2. Mixed hyperlipidemia  He is currently on Crestor, tolerating well.    3. Essential hypertension  He is currently on lisinopril. Reports compliance with medications. Blood pressure is well controlled.    4. Acquired hypothyroidism  He is currently on levothyroxine 112 µg daily.  Reports compliance of medication    ROS:  Constitutional: Negative for unintentional weight loss  Cardiac: Negative for palpitations    PMH:  Patient Active Problem List   Diagnosis   • Coronary artery disease due to lipid rich plaque   • Essential hypertension   • Type 2 diabetes mellitus without complication (HCC)   • Dyslipidemia   • Ischemic cardiomyopathy   • Hypertriglyceridemia   • Sleep apnea   • Prostate cancer screening   • Colon cancer screening   • ED (erectile dysfunction)   • Depression   • Hypothyroid   • Pulmonary nodule   • CHF (congestive heart failure), NYHA class II, chronic, systolic (CMS-[facility]) EF 40-45% 2017   • Chronic paroxysmal hemicrania, not intractable   • Persistent atrial fibrillation (HCC)   • Atypical atrial flutter (HCC)   • Anticoagulant long-term use   • Presence of biventricular AICD   • Chronic obstructive pulmonary disease with acute exacerbation ([facility])   • History of coronary artery stent placement ~2001     EXAM:  Vital signs: /62   Pulse 78   Ht 1.829 m (6')   Wt 110.2 kg (243 lb)   SpO2 100%    BMI 32.96 kg/m²   General: No apparent distress, cooperative  Eyes: No scleral icterus, no discharge  Neck: Normal on external inspection  Resp: Normal effort  Psych: Alert and oriented, normal mood and affect    Assessment and Plan:    1. Type 2 diabetes mellitus with hyperglycemia, with long-term current use of insulin (HCC)  · Hemoglobin A1c today in the clinic is 8.5%   · Goal A1c less than 7.5%  · Continue NPH 44 units at bedtime  · Increased regular insulin to 32 units with breakfast, 34 units with lunch and 36 units with dinner  · He is interested in trying Victoza, we will give him sample pens for Victoza and he will look into the cost of Victoza    2. Mixed hyperlipidemia  · LDL 74  · Continue Crestor    3. Essential hypertension  · Blood pressure is well controlled  · Continue lisinopril    4. Acquired hypothyroidism  · Continue levothyroxine 112 µg daily    Return in about 3 months (around 12/7/2018).    Thank you for allowing me to participate in the care of this patient.    Indira Herrera M.D.    CC:   Thomas Zhang M.D.    This note was created using voice recognition software (Dragon). The accuracy of the dictation is limited by the abilities of the software. I have reviewed the note prior to signing, however some errors in grammar and context are still possible. If you have any questions related to this note please do not hesitate to contact our office.

## 2018-09-11 DIAGNOSIS — I48.19 PERSISTENT ATRIAL FIBRILLATION (HCC): ICD-10-CM

## 2018-09-11 NOTE — PROGRESS NOTES
"[facility]    Spoke with pt and his wife concerning switching to a DOAC.  Previously they did not want to make the switch due to financial reasons, however they have investigate the price of the medication in the setting of the \"donut hole\".  They would like to transition to Eliquis.  No valves, DDI, or contraindication with renal function.    Pt to stop warfarin today, begin Eliquis 5 mg BID tomorrow.   Follow up in 3 months.    Trevor Winston, PharmD   "

## 2018-09-18 DIAGNOSIS — E78.49 OTHER HYPERLIPIDEMIA: ICD-10-CM

## 2018-09-18 RX ORDER — ROSUVASTATIN CALCIUM 40 MG/1
40 TABLET, COATED ORAL DAILY
Qty: 90 TAB | Refills: 2 | Status: SHIPPED | OUTPATIENT
Start: 2018-09-18

## 2018-09-19 DIAGNOSIS — I25.5 ISCHEMIC CARDIOMYOPATHY: ICD-10-CM

## 2018-09-19 RX ORDER — CARVEDILOL 3.12 MG/1
3.12 TABLET ORAL 2 TIMES DAILY WITH MEALS
Qty: 180 TAB | Refills: 1 | Status: SHIPPED | OUTPATIENT
Start: 2018-09-19 | End: 2019-04-19 | Stop reason: SDUPTHER

## 2018-10-17 ENCOUNTER — TELEPHONE (OUTPATIENT)
Dept: VASCULAR LAB | Facility: MEDICAL CENTER | Age: 73
End: 2018-10-17

## 2018-11-14 ENCOUNTER — NON-PROVIDER VISIT (OUTPATIENT)
Dept: CARDIOLOGY | Facility: PHYSICIAN GROUP | Age: 73
End: 2018-11-14
Payer: MEDICARE

## 2018-11-14 ENCOUNTER — ANTICOAGULATION MONITORING (OUTPATIENT)
Dept: VASCULAR LAB | Facility: MEDICAL CENTER | Age: 73
End: 2018-11-14

## 2018-11-14 VITALS
WEIGHT: 244 LBS | OXYGEN SATURATION: 90 % | DIASTOLIC BLOOD PRESSURE: 60 MMHG | SYSTOLIC BLOOD PRESSURE: 110 MMHG | HEART RATE: 70 BPM | HEIGHT: 72 IN | BODY MASS INDEX: 33.05 KG/M2

## 2018-11-14 DIAGNOSIS — Z79.01 ANTICOAGULANT LONG-TERM USE: ICD-10-CM

## 2018-11-14 DIAGNOSIS — I25.5 ISCHEMIC CARDIOMYOPATHY: Chronic | ICD-10-CM

## 2018-11-14 DIAGNOSIS — I48.91 ATRIAL FIBRILLATION, UNSPECIFIED TYPE (HCC): ICD-10-CM

## 2018-11-14 DIAGNOSIS — I48.19 PERSISTENT ATRIAL FIBRILLATION (HCC): ICD-10-CM

## 2018-11-14 DIAGNOSIS — I48.4 ATYPICAL ATRIAL FLUTTER (HCC): ICD-10-CM

## 2018-11-14 DIAGNOSIS — I25.83 CORONARY ARTERY DISEASE DUE TO LIPID RICH PLAQUE: ICD-10-CM

## 2018-11-14 DIAGNOSIS — Z95.810 PRESENCE OF BIVENTRICULAR AICD: ICD-10-CM

## 2018-11-14 DIAGNOSIS — I25.10 CORONARY ARTERY DISEASE DUE TO LIPID RICH PLAQUE: ICD-10-CM

## 2018-11-14 PROCEDURE — 93284 PRGRMG EVAL IMPLANTABLE DFB: CPT | Performed by: NURSE PRACTITIONER

## 2018-11-14 NOTE — PROGRESS NOTES
Device is working normally.  No device therapy.  Underlying rhythm is chronic atrial fibrillation (known); he is anticoagulated with Eliquis.  Normal sensing of RA and RV leads; stable capture of RV and LV leads; stable impedances. Battery charge time is 9.4 seconds; battery longevity is 4.4 years.  No changes are made today.    FU in 6 months for next AICD check with me. He will also be due for annual follow-up with Dr. Rush in April 2019.    He is given some Eliquis samples today.    Collaborating MD: CARLTON Rivas

## 2018-11-14 NOTE — PROGRESS NOTES
Pt presents to [place] office for Eliquis samples  Labs WNL  Cherelle Nixon, Clinical Pharmacist

## 2018-11-26 RX ORDER — APIXABAN 5 MG/1
TABLET, FILM COATED ORAL
Qty: 180 TAB | Refills: 0 | Status: SHIPPED | OUTPATIENT
Start: 2018-11-26 | End: 2019-08-05 | Stop reason: SDUPTHER

## 2019-01-02 ENCOUNTER — APPOINTMENT (OUTPATIENT)
Dept: ENDOCRINOLOGY | Facility: MEDICAL CENTER | Age: 74
End: 2019-01-02
Payer: MEDICARE

## 2019-01-23 ENCOUNTER — ANTICOAGULATION MONITORING (OUTPATIENT)
Dept: MEDICAL GROUP | Facility: PHYSICIAN GROUP | Age: 74
End: 2019-01-23

## 2019-01-23 ENCOUNTER — HOSPITAL ENCOUNTER (OUTPATIENT)
Dept: LAB | Facility: MEDICAL CENTER | Age: 74
End: 2019-01-23
Attending: NURSE PRACTITIONER
Payer: MEDICARE

## 2019-01-23 DIAGNOSIS — I48.19 PERSISTENT ATRIAL FIBRILLATION (HCC): ICD-10-CM

## 2019-01-23 DIAGNOSIS — I48.91 ATRIAL FIBRILLATION, UNSPECIFIED TYPE (HCC): ICD-10-CM

## 2019-01-23 DIAGNOSIS — I48.4 ATYPICAL ATRIAL FLUTTER (HCC): ICD-10-CM

## 2019-01-23 DIAGNOSIS — Z79.01 ANTICOAGULANT LONG-TERM USE: ICD-10-CM

## 2019-01-23 LAB
ALBUMIN SERPL BCP-MCNC: 3.6 G/DL (ref 3.2–4.9)
ALBUMIN/GLOB SERPL: 1.2 G/DL
ALP SERPL-CCNC: 42 U/L (ref 30–99)
ALT SERPL-CCNC: 35 U/L (ref 2–50)
ANION GAP SERPL CALC-SCNC: 8 MMOL/L (ref 0–11.9)
AST SERPL-CCNC: 29 U/L (ref 12–45)
BILIRUB SERPL-MCNC: 0.8 MG/DL (ref 0.1–1.5)
BUN SERPL-MCNC: 20 MG/DL (ref 8–22)
CALCIUM SERPL-MCNC: 8.8 MG/DL (ref 8.5–10.5)
CHLORIDE SERPL-SCNC: 102 MMOL/L (ref 96–112)
CO2 SERPL-SCNC: 30 MMOL/L (ref 20–33)
CREAT SERPL-MCNC: 1.12 MG/DL (ref 0.5–1.4)
ERYTHROCYTE [DISTWIDTH] IN BLOOD BY AUTOMATED COUNT: 49.5 FL (ref 35.9–50)
GLOBULIN SER CALC-MCNC: 3 G/DL (ref 1.9–3.5)
GLUCOSE SERPL-MCNC: 126 MG/DL (ref 65–99)
HCT VFR BLD AUTO: 50.4 % (ref 42–52)
HGB BLD-MCNC: 16.2 G/DL (ref 14–18)
MCH RBC QN AUTO: 29.8 PG (ref 27–33)
MCHC RBC AUTO-ENTMCNC: 32.1 G/DL (ref 33.7–35.3)
MCV RBC AUTO: 92.6 FL (ref 81.4–97.8)
POTASSIUM SERPL-SCNC: 4.6 MMOL/L (ref 3.6–5.5)
PROT SERPL-MCNC: 6.6 G/DL (ref 6–8.2)
RBC # BLD AUTO: 5.44 M/UL (ref 4.7–6.1)
SODIUM SERPL-SCNC: 140 MMOL/L (ref 135–145)
WBC # BLD AUTO: 9.5 K/UL (ref 4.8–10.8)

## 2019-01-23 PROCEDURE — 85048 AUTOMATED LEUKOCYTE COUNT: CPT

## 2019-01-23 PROCEDURE — 85018 HEMOGLOBIN: CPT

## 2019-01-23 PROCEDURE — 36415 COLL VENOUS BLD VENIPUNCTURE: CPT

## 2019-01-23 PROCEDURE — 85041 AUTOMATED RBC COUNT: CPT

## 2019-01-23 PROCEDURE — 80053 COMPREHEN METABOLIC PANEL: CPT

## 2019-01-23 PROCEDURE — 85014 HEMATOCRIT: CPT

## 2019-01-28 ENCOUNTER — OFFICE VISIT (OUTPATIENT)
Dept: ENDOCRINOLOGY | Facility: MEDICAL CENTER | Age: 74
End: 2019-01-28
Payer: MEDICARE

## 2019-01-28 VITALS
OXYGEN SATURATION: 90 % | HEIGHT: 71 IN | WEIGHT: 237 LBS | BODY MASS INDEX: 33.18 KG/M2 | DIASTOLIC BLOOD PRESSURE: 68 MMHG | SYSTOLIC BLOOD PRESSURE: 110 MMHG | HEART RATE: 73 BPM

## 2019-01-28 DIAGNOSIS — E03.9 ACQUIRED HYPOTHYROIDISM: ICD-10-CM

## 2019-01-28 DIAGNOSIS — E11.9 TYPE 2 DIABETES MELLITUS WITHOUT COMPLICATION, UNSPECIFIED WHETHER LONG TERM INSULIN USE (HCC): ICD-10-CM

## 2019-01-28 DIAGNOSIS — I10 ESSENTIAL HYPERTENSION: ICD-10-CM

## 2019-01-28 LAB
HBA1C MFR BLD: 8.1 % (ref ?–5.8)
INT CON NEG: NORMAL
INT CON POS: NORMAL

## 2019-01-28 PROCEDURE — 99214 OFFICE O/P EST MOD 30 MIN: CPT | Performed by: PHYSICIAN ASSISTANT

## 2019-01-28 PROCEDURE — 95251 CONT GLUC MNTR ANALYSIS I&R: CPT | Performed by: PHYSICIAN ASSISTANT

## 2019-01-28 PROCEDURE — 83036 HEMOGLOBIN GLYCOSYLATED A1C: CPT | Performed by: PHYSICIAN ASSISTANT

## 2019-01-28 RX ORDER — TRAMADOL HYDROCHLORIDE 50 MG/1
TABLET ORAL
COMMUNITY
Start: 2019-01-23 | End: 2021-10-22

## 2019-01-28 RX ORDER — CEFDINIR 300 MG/1
CAPSULE ORAL
COMMUNITY
Start: 2019-01-20 | End: 2019-03-12

## 2019-01-28 RX ORDER — POLYETHYLENE GLYCOL-3350 AND ELECTROLYTES 236; 6.74; 5.86; 2.97; 22.74 G/274.31G; G/274.31G; G/274.31G; G/274.31G; G/274.31G
POWDER, FOR SOLUTION ORAL
COMMUNITY
Start: 2019-01-16 | End: 2019-03-12

## 2019-01-28 RX ORDER — OMEPRAZOLE 40 MG/1
CAPSULE, DELAYED RELEASE ORAL
COMMUNITY
Start: 2019-01-16 | End: 2019-03-12

## 2019-01-28 RX ORDER — DICYCLOMINE HYDROCHLORIDE 10 MG/1
CAPSULE ORAL
COMMUNITY
Start: 2019-01-16 | End: 2019-03-12

## 2019-01-28 RX ORDER — DOXYCYCLINE HYCLATE 100 MG
TABLET ORAL
COMMUNITY
Start: 2019-01-20 | End: 2019-03-12

## 2019-01-28 RX ORDER — METOCLOPRAMIDE 5 MG/1
TABLET ORAL
COMMUNITY
Start: 2019-01-20 | End: 2019-03-12

## 2019-01-28 NOTE — PROGRESS NOTES
New Patient Consult Note  Referred by: Thomas Zhang M.D.    Reason for consult: Diabetes Management Type 2    HPI:  Chirag Balderas is a 73 y.o. old patient who is seeing us today for diabetes care.  This is a pleasant patient with diabetes and I appreciate the opportunity to participate in the care of this patient.  This is a new patient with me today.    Labs of 1/14/19 GFR 59, HbA1c is 8.2    BG Diary:1/28/2019  In the AM:  See Padilla Jonas    Has been Diabetic since T2   Has a Glucagon pen at home: no    1. Type 2 diabetes mellitus without complication, unspecified whether long term insulin use (HCC)  This is a new patient on 1/28/19  He is on:  1.  Novolin R (25units TID)  2.  Metformin  3.  Novolin N (At bedtime)    STOP:  1.  Novolin R (25units TID)  2.  Metformin  3.  Novolin N (At bedtime)    START  1.  Ozempic once a week 0.25   2.  Tresiba 20 units at night   3.  Synjardy 12.5/1000 one in the AM one in the PM    2. Essential hypertension  This is stable no changes needed    ROS:   Constitutional: No change in weight , No fatigue, No night sweats.  HEENT: No Headache.  Eyes:  No blurred vision, No visual changes.  Cardiac: No chest pain, No palpitations.  Resp: No shortness of breath, No cough,   Gastro: No nausea or vomiting, No diarrhea.  Neuro: Denies numbness or tinging in bilateral feet or hands, and no loss of sensation.  Endo: No heat or cold intolerance.  : No polyuria, No polydipsia, No chronic UTI's.  Lower extremities: No lower leg edema bilateral.  All other systems were reviewed and were negative.    Past Medical History:  Patient Active Problem List    Diagnosis Date Noted   • Presence of biventricular AICD 05/02/2016     Priority: High   • Persistent atrial fibrillation (HCC) 03/04/2016     Priority: High   • Atypical atrial flutter (HCC) 03/04/2016     Priority: High   • Anticoagulant long-term use 03/04/2016     Priority: High   • Ischemic cardiomyopathy 05/14/2013     Priority:  High   • Hypertriglyceridemia 05/14/2013     Priority: High   • Coronary artery disease due to lipid rich plaque 06/05/2012     Priority: High   • Essential hypertension 06/05/2012     Priority: High   • Dyslipidemia 06/05/2012     Priority: High   • CHF (congestive heart failure), NYHA class II, chronic, systolic (CMS-[facility]) EF 40-45% 2017 09/05/2014     Priority: Medium   • Sleep apnea 05/16/2013     Priority: Medium   • Type 2 diabetes mellitus without complication (HCC) 06/05/2012     Priority: Medium   • Hypothyroid 09/03/2014     Priority: Low   • Depression 02/21/2014     Priority: Low   • History of coronary artery stent placement ~2001 04/19/2018   • Chronic obstructive pulmonary disease with acute exacerbation (HCC) 08/29/2017   • Chronic paroxysmal hemicrania, not intractable 07/09/2015   • Pulmonary nodule 09/03/2014   • ED (erectile dysfunction) 08/20/2013   • Prostate cancer screening 06/25/2013   • Colon cancer screening 06/25/2013       Past Surgical History:  Past Surgical History:   Procedure Laterality Date   • RECOVERY  3/18/2016    Procedure: CATH LAB BIV ICD INSERT [facility];  Surgeon: Recoveryonly Surgery;  [place] [facility] [place]  Service:    • AICD IMPLANT  March 2016    [employer] 3365-40Q CRT-D implanted by Dr. Winslow.   • OTHER NEUROLOGICAL SURG  03/2013    Laminectomy lumbar   • CARDIAC CATH  2002   • CATARACT EXTRACTION WITH IOL Right 2000        • CATARACT EXTRACTION WITH IOL Left 1980            Allergies:  Pcn [penicillins]    Social History:  Social History     Social History   • Marital status:      Spouse name: N/A   • Number of children: N/A   • Years of education: N/A     Occupational History   • Not on file.     Social History Main Topics   • Smoking status: Former Smoker     Packs/day: 0.50     Years: 50.00     Types: Cigarettes     Quit date: 11/1/2012   • Smokeless tobacco: Never Used   • Alcohol use No   • Drug use: No   •  Sexual activity: Not on file     Other Topics Concern   • Not on file     Social History Narrative   • No narrative on file       Family History:  Family History   Problem Relation Age of Onset   • Other Mother 84        failure to thrive   • Heart Disease Mother    • Heart Attack Father 72       Medications:    Current Outpatient Prescriptions:   •  PROAIR  (90 Base) MCG/ACT Aero Soln inhalation aerosol, , Disp: , Rfl:   •  cefdinir (OMNICEF) 300 MG Cap, , Disp: , Rfl:   •  doxycycline (VIBRAMYCIN) 100 MG Tab, , Disp: , Rfl:   •  ipratropium (ATROVENT) 0.02 % Solution, , Disp: , Rfl:   •  metoclopramide (REGLAN) 5 MG tablet, , Disp: , Rfl:   •  omeprazole (PRILOSEC) 40 MG delayed-release capsule, , Disp: , Rfl:   •  GAVILYTE-G 236 g Recon Soln, , Disp: , Rfl:   •  tramadol (ULTRAM) 50 MG Tab, , Disp: , Rfl:   •  polyethylene glycol 3350 (MIRALAX) Powder, Take 17 g by mouth every day., Disp: 1 Bottle, Rfl: 3  •  ELIQUIS 5 MG Tab, TAKE 1 TABLET BY MOUTH TWICE DAILY, Disp: 180 Tab, Rfl: 0  •  carvedilol (COREG) 3.125 MG Tab, Take 1 Tab by mouth 2 times a day, with meals., Disp: 180 Tab, Rfl: 1  •  rosuvastatin (CRESTOR) 40 MG tablet, Take 1 Tab by mouth every day., Disp: 90 Tab, Rfl: 2  •  furosemide (LASIX) 40 MG Tab, TAKE 1 TABLET BY MOUTH ONCE DAILY AS NEEDED, Disp: 90 Tab, Rfl: 2  •  insulin NPH (NOVOLIN N RELION) 100 UNIT/ML Suspension, Inject 44 Units as instructed every bedtime., Disp: 20 mL, Rfl: 5  •  insulin regular (NOVOLIN R RELION) 100 Unit/mL Solution, Inject 30-40 Units as instructed 3 times a day before meals. (Patient taking differently: Inject 30 Units as instructed 3 times a day before meals.), Disp: 40 mL, Rfl: 5  •  Continuous Blood Gluc Sensor (FREESTYLE ERICKA SENSOR SYSTEM) Misc, 1 Each by Does not apply route every 10 days., Disp: 3 Each, Rfl: 6  •  lisinopril (PRINIVIL) 20 MG Tab, Take 1 Tab by mouth every day., Disp: 90 Tab, Rfl: 3  •  metformin (GLUCOPHAGE) 1000 MG tablet, take 1  "tablet by mouth twice a day WITH A MEAL, Disp: 180 Tab, Rfl: 3  •  FREESTYLE LITE strip, TEST BLOOD SUGAR 4 TO 5 TIMES DAILY AS DIRECTED, Disp: 500 Strip, Rfl: 2  •  levothyroxine (SYNTHROID) 112 MCG Tab, TAKE ONE TABLET BY MOUTH IN THE MORNING ON EMPTY STOMACH, Disp: 90 Tab, Rfl: 3  •  Insulin Syringe-Needle U-100 31G X 3/8\" 0.5 ML Misc, Using 5 per day with insulin, Disp: 400 Each, Rfl: 2  •  dicyclomine (BENTYL) 10 MG Cap, , Disp: , Rfl:   •  NON SPECIFIED, Oxygen 2 lt, Disp: , Rfl:   •  Blood Glucose Monitoring Suppl SUPPLIES Misc, Freestyle Freedome Lite test strips. Use to test blood sugars 4-5 times daily as directed. Dx: ICD-10. E11.9, Z74.9, Disp: 500 Strip, Rfl: 3      Physical Examination:   Vital signs: /68 (BP Location: Left arm, Patient Position: Sitting, BP Cuff Size: Adult)   Pulse 73   Ht 1.791 m (5' 10.51\")   Wt 107.5 kg (237 lb)   SpO2 90%   BMI 33.51 kg/m²   General: No distress, cooperative, well dressed and well nourished.   Eyes: No scleral icterus or discharge, No hyposphagma  ENMT: Normal on external inspection of nose, lips, No nasal drainage   Neck: No abnormal masses on inspection  Resp: Normal effort, Bilateral clear to auscultation, No wheezing, No rales  CVS: Regular rate and rhythm, S1 S2 normal, No murmur. No gallop  Extremities: No edema bilateral extremities  Neuro: Alert and oriented  Skin: No rash, No Ulcers  Psych: Normal mood and affect      Assessment and Plan:    1. Type 2 diabetes mellitus without complication, unspecified whether long term insulin use (HCC)  STOP:  1.  Novolin R (25units TID)  2.  Metformin  3.  Novolin N (At bedtime)    START  1.  Ozempic once a week 0.25   2.  Tresiba 20 units at night   3.  Synjardy 12.5/1000 one in the AM one in the PM    2. Essential hypertension  This is stable and no changes needed    3. Acquired hypothyroidism  This is stable and no changes needed    Return in about 1 month (around 2/28/2019).    Blood glucose log: Check " BG in the morning when wake up, before lunch or dinner and before bed.  So three times a day.  Always bring BG diary to the next office visit.     Thank you kindly for allowing me to participate in the diabetes care plan for this patient.    Luís Otto PA-C, BC-[facility]  Board Certified - Advanced Diabetes Management  01/28/19    CC:   Thomas Zhang M.D.

## 2019-01-28 NOTE — PATIENT INSTRUCTIONS
STOP:  1.  Novolin R (25units TID)  2.  Metformin  3.  Novolin N (At bedtime)    START  1.  Ozempic once a week 0.25   2.  Tresiba 20 units at night   3.  Synjardy 12.5/1000 one in the AM one in the PM

## 2019-01-30 ENCOUNTER — TELEPHONE (OUTPATIENT)
Dept: ENDOCRINOLOGY | Facility: MEDICAL CENTER | Age: 74
End: 2019-01-30

## 2019-01-30 NOTE — TELEPHONE ENCOUNTER
VOICEMAIL    1. Caller Name: Chirag Balderas                          Call Back Number: [phone] (home)       2. Message: Patient's wife called and left a vm stating patient is doing good in the new medication regimen. He is still having blood sugar readings in the low to mid 200's. She would like to know if this is still okay or if he needs to adjust his medications.    Please advise     3. Patient approves office to leave a detailed voicemail/MyChart message: N\A

## 2019-03-05 ENCOUNTER — OFFICE VISIT (OUTPATIENT)
Dept: ENDOCRINOLOGY | Facility: MEDICAL CENTER | Age: 74
End: 2019-03-05
Payer: MEDICARE

## 2019-03-05 VITALS — HEART RATE: 71 BPM | OXYGEN SATURATION: 97 %

## 2019-03-05 DIAGNOSIS — I10 ESSENTIAL HYPERTENSION: ICD-10-CM

## 2019-03-05 DIAGNOSIS — E11.9 TYPE 2 DIABETES MELLITUS WITHOUT COMPLICATION, UNSPECIFIED WHETHER LONG TERM INSULIN USE (HCC): ICD-10-CM

## 2019-03-05 PROCEDURE — 99214 OFFICE O/P EST MOD 30 MIN: CPT | Performed by: PHYSICIAN ASSISTANT

## 2019-03-05 PROCEDURE — 95251 CONT GLUC MNTR ANALYSIS I&R: CPT | Performed by: PHYSICIAN ASSISTANT

## 2019-03-05 NOTE — PROGRESS NOTES
Return to office Patient Consult Note  Referred by: Thomas Zhang M.D.    Reason for consult: Diabetes Management Type 2    HPI:  Chirag Balderas is a 73 y.o. old patient who is seeing us today for diabetes care.  This is a pleasant patient with diabetes and I appreciate the opportunity to participate in the care of this patient.    Labs of 1/14/19 GFR 59, HbA1c is 8.2    BG Diary:3/5/2019  In the AM:  Abbott Jonas      1. Type 2 diabetes mellitus without complication, unspecified whether long term insulin use (HCC)  This is a new patient on 1/28/19  He is on:  1.  Novolin R (25units TID)  2.  Metformin  3.  Novolin N (At bedtime)     STOP:  1.  Novolin R (25units TID)  2.  Metformin  3.  Novolin N (At bedtime)     Now on:  1.  Ozempic once a week 0.25   2.  Tresiba 20 units at night   3.  Synjardy 12.5/1000 one in the AM one in the PM     2. Essential hypertension  This is stable no changes needed       ROS:   Constitutional: No night sweats.  Eyes:  No visual changes.  Cardiac: No chest pain, No palpitations or racing heart rate.  Resp: No shortness of breath, No cough,   Gi: No Diarrhea    All other systems were reviewed and were/are negative.  The ROS was revised/revisited during this office visit from the patients first office visit with me on 1/28/19 Please review the full ROS during the first office visit.    Past Medical History:  Patient Active Problem List    Diagnosis Date Noted   • Presence of biventricular AICD 05/02/2016     Priority: High   • Persistent atrial fibrillation (HCC) 03/04/2016     Priority: High   • Atypical atrial flutter (HCC) 03/04/2016     Priority: High   • Anticoagulant long-term use 03/04/2016     Priority: High   • Ischemic cardiomyopathy 05/14/2013     Priority: High   • Hypertriglyceridemia 05/14/2013     Priority: High   • Coronary artery disease due to lipid rich plaque 06/05/2012     Priority: High   • Essential hypertension 06/05/2012     Priority: High   •  Dyslipidemia 06/05/2012     Priority: High   • CHF (congestive heart failure), NYHA class II, chronic, systolic (CMS-[facility]) EF 40-45% 2017 09/05/2014     Priority: Medium   • Sleep apnea 05/16/2013     Priority: Medium   • Type 2 diabetes mellitus without complication (HCC) 06/05/2012     Priority: Medium   • Hypothyroid 09/03/2014     Priority: Low   • Depression 02/21/2014     Priority: Low   • History of coronary artery stent placement ~2001 04/19/2018   • Chronic obstructive pulmonary disease with acute exacerbation (HCC) 08/29/2017   • Chronic paroxysmal hemicrania, not intractable 07/09/2015   • Pulmonary nodule 09/03/2014   • ED (erectile dysfunction) 08/20/2013   • Prostate cancer screening 06/25/2013   • Colon cancer screening 06/25/2013       Past Surgical History:  Past Surgical History:   Procedure Laterality Date   • RECOVERY  3/18/2016    Procedure: CATH LAB BIV ICD INSERT [facility];  Surgeon: Recoveryonly Surgery;  [place] PRE[place] PROC [place]IT [facility] [place]  Service:    • AICD IMPLANT  March 2016    [employer] 3365-40Q CRT-D implanted by Dr. Winslow.   • OTHER NEUROLOGICAL SURG  03/2013    Laminectomy lumbar   • CARDIAC CATH  2002   • CATARACT EXTRACTION WITH IOL Right 2000        • CATARACT EXTRACTION WITH IOL Left 1980            Allergies:  Pcn [penicillins]    Social History:  Social History     Social History   • Marital status:      Spouse name: N/A   • Number of children: N/A   • Years of education: N/A     Occupational History   • Not on file.     Social History Main Topics   • Smoking status: Former Smoker     Packs/day: 0.50     Years: 50.00     Types: Cigarettes     Quit date: 11/1/2012   • Smokeless tobacco: Never Used   • Alcohol use No   • Drug use: No   • Sexual activity: Not on file     Other Topics Concern   • Not on file     Social History Narrative   • No narrative on file       Family History:  Family History   Problem Relation Age of Onset   • Other  Mother 84        failure to thrive   • Heart Disease Mother    • Heart Attack Father 72       Medications:    Current Outpatient Prescriptions:   •  PROAIR  (90 Base) MCG/ACT Aero Soln inhalation aerosol, , Disp: , Rfl:   •  cefdinir (OMNICEF) 300 MG Cap, , Disp: , Rfl:   •  dicyclomine (BENTYL) 10 MG Cap, , Disp: , Rfl:   •  doxycycline (VIBRAMYCIN) 100 MG Tab, , Disp: , Rfl:   •  ipratropium (ATROVENT) 0.02 % Solution, , Disp: , Rfl:   •  metoclopramide (REGLAN) 5 MG tablet, , Disp: , Rfl:   •  omeprazole (PRILOSEC) 40 MG delayed-release capsule, , Disp: , Rfl:   •  GAVILYTE-G 236 g Recon Soln, , Disp: , Rfl:   •  tramadol (ULTRAM) 50 MG Tab, , Disp: , Rfl:   •  Semaglutide (OZEMPIC) 1 MG/DOSE Solution Pen-injector, Inject 1 mg as instructed every 7 days., Disp: 1 PEN, Rfl: 11  •  Empagliflozin-Metformin HCl ER 12.5-1000 MG TABLET SR 24 HR, Take 1 Tab by mouth 2 times a day., Disp: 60 Tab, Rfl: 11  •  Insulin Degludec (TRESIBA FLEXTOUCH) 200 UNIT/ML Solution Pen-injector, Inject 50 Units as instructed every bedtime., Disp: 3 PEN, Rfl: 2  •  polyethylene glycol 3350 (MIRALAX) Powder, Take 17 g by mouth every day., Disp: 1 Bottle, Rfl: 3  •  ELIQUIS 5 MG Tab, TAKE 1 TABLET BY MOUTH TWICE DAILY, Disp: 180 Tab, Rfl: 0  •  carvedilol (COREG) 3.125 MG Tab, Take 1 Tab by mouth 2 times a day, with meals., Disp: 180 Tab, Rfl: 1  •  rosuvastatin (CRESTOR) 40 MG tablet, Take 1 Tab by mouth every day., Disp: 90 Tab, Rfl: 2  •  furosemide (LASIX) 40 MG Tab, TAKE 1 TABLET BY MOUTH ONCE DAILY AS NEEDED, Disp: 90 Tab, Rfl: 2  •  Continuous Blood Gluc Sensor (FREESTYLE ERICKA SENSOR SYSTEM) Misc, 1 Each by Does not apply route every 10 days., Disp: 3 Each, Rfl: 6  •  lisinopril (PRINIVIL) 20 MG Tab, Take 1 Tab by mouth every day., Disp: 90 Tab, Rfl: 3  •  FREESTYLE LITE strip, TEST BLOOD SUGAR 4 TO 5 TIMES DAILY AS DIRECTED, Disp: 500 Strip, Rfl: 2  •  levothyroxine (SYNTHROID) 112 MCG Tab, TAKE ONE TABLET BY MOUTH IN THE  "MORNING ON EMPTY STOMACH, Disp: 90 Tab, Rfl: 3  •  Insulin Syringe-Needle U-100 31G X 3/8\" 0.5 ML Misc, Using 5 per day with insulin, Disp: 400 Each, Rfl: 2        Physical Examination:   Vital signs: Pulse 71   SpO2 97%   General: No distress, cooperative, well dressed and well nourished.   Eyes: No scleral icterus or discharge, No hyposphagma  ENMT: Normal on external inspection of nose, lips, No nasal drainage   Neck: No abnormal masses on inspection  Resp: Normal effort, Bilateral clear to auscultation, No wheezing, No rales  CVS: Regular rate and rhythm, S1 S2 normal, No murmur. No gallop  Extremities: No edema bilateral extremities  Neuro: Alert and oriented  Skin: No rash, No Ulcers  Psych: Normal mood and affect      Assessment and Plan:    1. Type 2 diabetes mellitus without complication, unspecified whether long term insulin use (HCC)  Now on:  1.  Ozempic once a week 0.25  (INCREASE to 0.5 for two weeks then after two go to 1.0  2.  Tresiba 30 units at night  (INcrease to 36)  3.  Synjardy 12.5/1000 one in the AM one in the PM    2. Essential hypertension  This is stable today and no new changes are needed or required in today's visit        Return in about 2 months (around 5/5/2019).    Blood glucose log: Check BG in the morning when wake up, before lunch or dinner and before bed.  So three times a day.  Always bring BG diary to the next office visit.     Thank you kindly for allowing me to participate in the diabetes care plan for this patient.    Luís Otto PA-C, BC-ADM  Board Certified - Advanced Diabetes Management  03/05/19    CC:   Thomas Zhang M.D.    "

## 2019-03-05 NOTE — PATIENT INSTRUCTIONS
Now on:  1.  Ozempic once a week 0.25  (INCREASE to 0.5 for two weeks then after two go to 1.0  2.  Tresiba 30 units at night  (INcrease to 36)  3.  Synjardy 12.5/1000 one in the AM one in the PM

## 2019-03-12 ENCOUNTER — OFFICE VISIT (OUTPATIENT)
Dept: CARDIOLOGY | Facility: CLINIC | Age: 74
End: 2019-03-12
Payer: MEDICARE

## 2019-03-12 VITALS
WEIGHT: 227 LBS | DIASTOLIC BLOOD PRESSURE: 60 MMHG | HEIGHT: 71 IN | SYSTOLIC BLOOD PRESSURE: 118 MMHG | OXYGEN SATURATION: 95 % | BODY MASS INDEX: 31.78 KG/M2 | HEART RATE: 68 BPM

## 2019-03-12 DIAGNOSIS — I48.4 ATYPICAL ATRIAL FLUTTER (HCC): ICD-10-CM

## 2019-03-12 DIAGNOSIS — I50.22 CHF (CONGESTIVE HEART FAILURE), NYHA CLASS II, CHRONIC, SYSTOLIC (HCC): Chronic | ICD-10-CM

## 2019-03-12 DIAGNOSIS — I25.83 CORONARY ARTERY DISEASE DUE TO LIPID RICH PLAQUE: ICD-10-CM

## 2019-03-12 DIAGNOSIS — I48.19 PERSISTENT ATRIAL FIBRILLATION (HCC): ICD-10-CM

## 2019-03-12 DIAGNOSIS — I25.10 CORONARY ARTERY DISEASE DUE TO LIPID RICH PLAQUE: ICD-10-CM

## 2019-03-12 DIAGNOSIS — Z95.5 HISTORY OF CORONARY ARTERY STENT PLACEMENT: ICD-10-CM

## 2019-03-12 DIAGNOSIS — I25.5 ISCHEMIC CARDIOMYOPATHY: Chronic | ICD-10-CM

## 2019-03-12 DIAGNOSIS — Z95.810 PRESENCE OF BIVENTRICULAR AICD: ICD-10-CM

## 2019-03-12 PROCEDURE — 99214 OFFICE O/P EST MOD 30 MIN: CPT | Performed by: INTERNAL MEDICINE

## 2019-03-12 RX ORDER — ALBUTEROL SULFATE 2.5 MG/3ML
SOLUTION RESPIRATORY (INHALATION)
COMMUNITY
Start: 2019-02-22 | End: 2022-03-04 | Stop reason: SDUPTHER

## 2019-03-12 ASSESSMENT — ENCOUNTER SYMPTOMS
COUGH: 0
FEVER: 0
BLURRED VISION: 0
PND: 0
ABDOMINAL PAIN: 0
CLAUDICATION: 0
FOCAL WEAKNESS: 0
SORE THROAT: 0
DIZZINESS: 0
FALLS: 0
CHILLS: 0
NAUSEA: 0
WEAKNESS: 0
PALPITATIONS: 0
SHORTNESS OF BREATH: 1
BRUISES/BLEEDS EASILY: 0

## 2019-03-12 NOTE — LETTER
[facility] [place] [address],   2nd Floor  [place] [address]  Phone: [phone]  Fax: [phone]              Chirag Balderas  1945    Encounter Date: 3/12/2019    Reza Rush M.D.          PROGRESS NOTE:  Chief Complaint   Patient presents with   • Coronary Artery Disease       Subjective:   Chirag Balderas is a 73 y.o. male who presents today for follow-up of his history of ischemic cardiomyopathy with remote inferior MI and atrial arrhythmias with chronic atrial fibrillation on chronic anticoagulation    He is been doing well over the past year he has lost significant weight intentionally oxygen remains stable on supplemental oxygen he is tolerating anticoagulation well    Past Medical History:   Diagnosis Date   • Atrial fibrillation, persistent (HCC)         • Breath shortness     On oxygen   • Cataract     Bilateral IOL   • CHF (congestive heart failure), NYHA class II, chronic, systolic (CMS-[facility]) - EF 40-45% 2017    • Chronic anticoagulation         • COPD (chronic obstructive pulmonary disease) ([facility])    • Coronary artery disease 2002    History of remote MI/stent   • Dental disorder     Dentures   • Depression    • DIABETES MELLITUS     On insulin   • HTN (hypertension)     • Hyperlipidemia     • Hypothyroidism    • Ischemic cardiomyopathy March 2015    Echocardiogram with LVEF 30-40%. Mild-moderate septal hypertrophy. Moderately enlarged LA.  Moderate-severely enlarged RA. Mild AR. Mild MR. Mild TR, RVSP 5-10mmHg.   • Myocardial infarction (HCC) 2002   • Sleep apnea     Uses CPAP   • Supplemental oxygen dependent    • Systolic CHF (HCC)       Past Surgical History:   Procedure Laterality Date   • RECOVERY  3/18/2016    Procedure: CATH LAB BIV ICD INSERT [facility];  Surgeon: Recoveryonomer Surgery;  [place] [facility] [place]  [place]:    • AICD IMPLANT  March 2016    [employer]rei 3365-40Q CRT-D implanted by Dr. Winslow.   • OTHER NEUROLOGICAL SURG  03/2013    Laminectomy lumbar   • CARDIAC CATH  2002   • CATARACT EXTRACTION WITH IOL Right 2000        • CATARACT EXTRACTION WITH IOL Left 1980          Family History   Problem Relation Age of Onset   • Other Mother 84        failure to thrive   • Heart Disease Mother    • Heart Attack Father 72     Social History     Social History   • Marital status:      Spouse name: N/A   • Number of children: N/A   • Years of education: N/A     Occupational History   • Not on file.     Social History Main Topics   • Smoking status: Former Smoker     Packs/day: 0.50     Years: 50.00     Types: Cigarettes     Quit date: 11/1/2012   • Smokeless tobacco: Never Used   • Alcohol use No   • Drug use: No   • Sexual activity: Not on file     Other Topics Concern   • Not on file     Social History Narrative   • No narrative on file     Allergies   Allergen Reactions   • Pcn [Penicillins]      swelling     Outpatient Encounter Prescriptions as of 3/12/2019   Medication Sig Dispense Refill   • albuterol (PROVENTIL) 2.5mg/3ml Nebu Soln solution for nebulization      • ANORO ELLIPTA 62.5-25 MCG/INH AEROSOL POWDER, BREATH ACTIVATED inhaler      • ipratropium (ATROVENT) 0.02 % Solution      • tramadol (ULTRAM) 50 MG Tab      • Semaglutide (OZEMPIC) 1 MG/DOSE Solution Pen-injector Inject 1 mg as instructed every 7 days. 1 PEN 11   • Empagliflozin-Metformin HCl ER 12.5-1000 MG TABLET SR 24 HR Take 1 Tab by mouth 2 times a day. 60 Tab 11   • Insulin Degludec (TRESIBA FLEXTOUCH) 200 UNIT/ML Solution Pen-injector Inject 50 Units as instructed every bedtime. 3 PEN 2   • polyethylene glycol 3350 (MIRALAX) Powder Take 17 g by mouth every day. 1 Bottle 3   • ELIQUIS 5 MG Tab TAKE 1 TABLET BY MOUTH TWICE DAILY 180 Tab 0   • carvedilol (COREG) 3.125 MG Tab Take 1 Tab by mouth 2 times a day, with meals. 180 Tab 1   • rosuvastatin (CRESTOR) 40 MG tablet Take 1 Tab by mouth  "every day. 90 Tab 2   • furosemide (LASIX) 40 MG Tab TAKE 1 TABLET BY MOUTH ONCE DAILY AS NEEDED 90 Tab 2   • Continuous Blood Gluc Sensor (FREESTYLE ERICKA SENSOR SYSTEM) Misc 1 Each by Does not apply route every 10 days. 3 Each 6   • lisinopril (PRINIVIL) 20 MG Tab Take 1 Tab by mouth every day. 90 Tab 3   • FREESTYLE LITE strip TEST BLOOD SUGAR 4 TO 5 TIMES DAILY AS DIRECTED 500 Strip 2   • levothyroxine (SYNTHROID) 112 MCG Tab TAKE ONE TABLET BY MOUTH IN THE MORNING ON EMPTY STOMACH 90 Tab 3   • Insulin Syringe-Needle U-100 31G X 3/8\" 0.5 ML Misc Using 5 per day with insulin 400 Each 2   • [DISCONTINUED] PROAIR  (90 Base) MCG/ACT Aero Soln inhalation aerosol      • [DISCONTINUED] cefdinir (OMNICEF) 300 MG Cap      • [DISCONTINUED] dicyclomine (BENTYL) 10 MG Cap      • [DISCONTINUED] doxycycline (VIBRAMYCIN) 100 MG Tab      • [DISCONTINUED] metoclopramide (REGLAN) 5 MG tablet      • [DISCONTINUED] omeprazole (PRILOSEC) 40 MG delayed-release capsule      • [DISCONTINUED] GAVILYTE-G 236 g Recon Soln        No facility-administered encounter medications on file as of 3/12/2019.      Review of Systems   Constitutional: Negative for chills and fever.   HENT: Negative for sore throat.    Eyes: Negative for blurred vision.   Respiratory: Positive for shortness of breath (stable). Negative for cough.    Cardiovascular: Negative for chest pain, palpitations, claudication, leg swelling and PND.   Gastrointestinal: Negative for abdominal pain and nausea.   Musculoskeletal: Negative for falls and joint pain.   Skin: Negative for rash.   Neurological: Negative for dizziness, focal weakness and weakness.   Endo/Heme/Allergies: Does not bruise/bleed easily.        Objective:   /60 (BP Location: Right arm, Patient Position: Sitting)   Pulse 68   Ht 1.803 m (5' 11\")   Wt 103 kg (227 lb)   SpO2 95%   BMI 31.66 kg/m²      Physical Exam   Constitutional: No distress.   HENT:   Mouth/Throat: Oropharynx is clear and " moist. No oropharyngeal exudate.   Eyes: No scleral icterus.   Neck: No JVD present.   Cardiovascular: Normal rate and normal heart sounds.  An irregularly irregular rhythm present. Exam reveals no gallop and no friction rub.    No murmur heard.  Pulmonary/Chest: No respiratory distress. He has no wheezes. He has no rales.   Abdominal: Soft. Bowel sounds are normal.   Musculoskeletal: He exhibits no edema.   Neurological: He is alert.   Skin: No rash noted. He is not diaphoretic.   Psychiatric: He has a normal mood and affect.       Assessment:     1. Atypical atrial flutter (HCC)     2. CHF (congestive heart failure), NYHA class II, chronic, systolic (CMS-[facility]) EF 40-45% 2017     3. Coronary artery disease due to lipid rich plaque     4. History of coronary artery stent placement ~2001     5. Persistent atrial fibrillation (HCC)     6. Ischemic cardiomyopathy     7. Presence of biventricular AICD         Medical Decision Making:  Today's Assessment / Status / Plan:     It was my pleasure to meet with Mr. Balderas.    Pacer check finds is functioning appropriately    Blood pressure is well controlled.      He is on appropriate statin.    He is on the SGL2 inhibitors    He can safely have colonoscopy and hold his Eliquis as needed    He understands the risks and benefits of chronic anticoagulation    I will see Mr. Balderas back in 1 year time and encouraged him to follow up with us over the phone or e-mail using my MyChart as issues arise.    It is my pleasure to participate in the care of Mr. Balderas.  Please do not hesitate to contact me with questions or concerns.    Reza Rush MD PhD FACC  Cardiologist [facility]    Please note that this dictation was created using voice recognition software. I have worked with consultants from the vendor as well as technical experts from [facility] to optimize the interface. I have made every reasonable attempt to correct obvious errors,  but I expect that there are errors of grammar and possibly content I did not discover before finalizing the note.         Angela Adams M.D.  [address] [address]  VIA Facsimile: [phone]

## 2019-03-12 NOTE — LETTER
PROCEDURE/SURGERY CLEARANCE FORM      Encounter Date: 3/12/2019    Patient: Chirag Balderas  YOB: 1945    CARDIOLOGIST:  Reza Rush M.D.    REFERRING DOCTOR:  [facility]      The above patient is cleared to have the following procedure/surgery: colonscopy                                         Additional comments:  He may safely stop Eliquis  at least 2 days prior    It is my pleasure to participate in the care of Mr. Balderas.  Please do not hesitate to contact me with questions or concerns. [facility] is available 24/7 for consultative services at [phone] in the perioperative period.    Electronically Signed    Reza Rush MD PhD [facility]  Cardiologist [facility]    Please note that this dictation was created using voice recognition software. I have worked with consultants from the vendor as well as technical experts from [facility] to optimize the interface. I have made every reasonable attempt to correct obvious errors, but I expect that there are errors of grammar and possibly content I did not discover before finalizing the note.

## 2019-03-12 NOTE — PROGRESS NOTES
Chief Complaint   Patient presents with   • Coronary Artery Disease       Subjective:   Chirag Balderas is a 73 y.o. male who presents today for follow-up of his history of ischemic cardiomyopathy with remote inferior MI and atrial arrhythmias with chronic atrial fibrillation on chronic anticoagulation    He is been doing well over the past year he has lost significant weight intentionally oxygen remains stable on supplemental oxygen he is tolerating anticoagulation well    Past Medical History:   Diagnosis Date   • Atrial fibrillation, persistent ([facility])         • Breath shortness     On oxygen   • Cataract     Bilateral IOL   • CHF (congestive heart failure), NYHA class II, chronic, systolic (CMS-[facility]) - EF 40-45% 2017    • Chronic anticoagulation         • COPD (chronic obstructive pulmonary disease) ([facility])    • Coronary artery disease 2002    History of remote MI/stent   • Dental disorder     Dentures   • Depression    • DIABETES MELLITUS     On insulin   • HTN (hypertension)     • Hyperlipidemia     • Hypothyroidism    • Ischemic cardiomyopathy March 2015    Echocardiogram with LVEF 30-40%. Mild-moderate septal hypertrophy. Moderately enlarged LA.  Moderate-severely enlarged RA. Mild AR. Mild MR. Mild TR, RVSP 5-10mmHg.   • Myocardial infarction ([facility]) 2002   • Sleep apnea     Uses CPAP   • Supplemental oxygen dependent    • Systolic CHF ([facility])       Past Surgical History:   Procedure Laterality Date   • RECOVERY  3/18/2016    Procedure: CATH LAB BIV ICD INSERT [facility];  Surgeon: Recoveryonomer Surgery;  [place] [facility] [place]:    • AICD IMPLANT  March 2016    [employer] 3365-40Q CRT-D implanted by Dr. Winslow.   • OTHER NEUROLOGICAL SURG  03/2013    Laminectomy lumbar   • CARDIAC CATH  2002   • CATARACT EXTRACTION WITH IOL Right 2000        • CATARACT EXTRACTION WITH IOL Left 1980          Family History   Problem Relation Age of Onset   • Other Mother 84         failure to thrive   • Heart Disease Mother    • Heart Attack Father 72     Social History     Social History   • Marital status:      Spouse name: N/A   • Number of children: N/A   • Years of education: N/A     Occupational History   • Not on file.     Social History Main Topics   • Smoking status: Former Smoker     Packs/day: 0.50     Years: 50.00     Types: Cigarettes     Quit date: 11/1/2012   • Smokeless tobacco: Never Used   • Alcohol use No   • Drug use: No   • Sexual activity: Not on file     Other Topics Concern   • Not on file     Social History Narrative   • No narrative on file     Allergies   Allergen Reactions   • Pcn [Penicillins]      swelling     Outpatient Encounter Prescriptions as of 3/12/2019   Medication Sig Dispense Refill   • albuterol (PROVENTIL) 2.5mg/3ml Nebu Soln solution for nebulization      • ANORO ELLIPTA 62.5-25 MCG/INH AEROSOL POWDER, BREATH ACTIVATED inhaler      • ipratropium (ATROVENT) 0.02 % Solution      • tramadol (ULTRAM) 50 MG Tab      • Semaglutide (OZEMPIC) 1 MG/DOSE Solution Pen-injector Inject 1 mg as instructed every 7 days. 1 PEN 11   • Empagliflozin-Metformin HCl ER 12.5-1000 MG TABLET SR 24 HR Take 1 Tab by mouth 2 times a day. 60 Tab 11   • Insulin Degludec (TRESIBA FLEXTOUCH) 200 UNIT/ML Solution Pen-injector Inject 50 Units as instructed every bedtime. 3 PEN 2   • polyethylene glycol 3350 (MIRALAX) Powder Take 17 g by mouth every day. 1 Bottle 3   • ELIQUIS 5 MG Tab TAKE 1 TABLET BY MOUTH TWICE DAILY 180 Tab 0   • carvedilol (COREG) 3.125 MG Tab Take 1 Tab by mouth 2 times a day, with meals. 180 Tab 1   • rosuvastatin (CRESTOR) 40 MG tablet Take 1 Tab by mouth every day. 90 Tab 2   • furosemide (LASIX) 40 MG Tab TAKE 1 TABLET BY MOUTH ONCE DAILY AS NEEDED 90 Tab 2   • Continuous Blood Gluc Sensor (FREESTYLE ERICKA SENSOR SYSTEM) Misc 1 Each by Does not apply route every 10 days. 3 Each 6   • lisinopril (PRINIVIL) 20 MG Tab Take 1 Tab by mouth every day. 90  "Tab 3   • FREESTYLE LITE strip TEST BLOOD SUGAR 4 TO 5 TIMES DAILY AS DIRECTED 500 Strip 2   • levothyroxine (SYNTHROID) 112 MCG Tab TAKE ONE TABLET BY MOUTH IN THE MORNING ON EMPTY STOMACH 90 Tab 3   • Insulin Syringe-Needle U-100 31G X 3/8\" 0.5 ML Misc Using 5 per day with insulin 400 Each 2   • [DISCONTINUED] PROAIR  (90 Base) MCG/ACT Aero Soln inhalation aerosol      • [DISCONTINUED] cefdinir (OMNICEF) 300 MG Cap      • [DISCONTINUED] dicyclomine (BENTYL) 10 MG Cap      • [DISCONTINUED] doxycycline (VIBRAMYCIN) 100 MG Tab      • [DISCONTINUED] metoclopramide (REGLAN) 5 MG tablet      • [DISCONTINUED] omeprazole (PRILOSEC) 40 MG delayed-release capsule      • [DISCONTINUED] GAVILYTE-G 236 g Recon Soln        No facility-administered encounter medications on file as of 3/12/2019.      Review of Systems   Constitutional: Negative for chills and fever.   HENT: Negative for sore throat.    Eyes: Negative for blurred vision.   Respiratory: Positive for shortness of breath (stable). Negative for cough.    Cardiovascular: Negative for chest pain, palpitations, claudication, leg swelling and PND.   Gastrointestinal: Negative for abdominal pain and nausea.   Musculoskeletal: Negative for falls and joint pain.   Skin: Negative for rash.   Neurological: Negative for dizziness, focal weakness and weakness.   Endo/Heme/Allergies: Does not bruise/bleed easily.        Objective:   /60 (BP Location: Right arm, Patient Position: Sitting)   Pulse 68   Ht 1.803 m (5' 11\")   Wt 103 kg (227 lb)   SpO2 95%   BMI 31.66 kg/m²     Physical Exam   Constitutional: No distress.   HENT:   Mouth/Throat: Oropharynx is clear and moist. No oropharyngeal exudate.   Eyes: No scleral icterus.   Neck: No JVD present.   Cardiovascular: Normal rate and normal heart sounds.  An irregularly irregular rhythm present. Exam reveals no gallop and no friction rub.    No murmur heard.  Pulmonary/Chest: No respiratory distress. He has no " wheezes. He has no rales.   Abdominal: Soft. Bowel sounds are normal.   Musculoskeletal: He exhibits no edema.   Neurological: He is alert.   Skin: No rash noted. He is not diaphoretic.   Psychiatric: He has a normal mood and affect.       Assessment:     1. Atypical atrial flutter (HCC)     2. CHF (congestive heart failure), NYHA class II, chronic, systolic (CMS-HCC) EF 40-45% 2017     3. Coronary artery disease due to lipid rich plaque     4. History of coronary artery stent placement ~2001     5. Persistent atrial fibrillation (HCC)     6. Ischemic cardiomyopathy     7. Presence of biventricular AICD         Medical Decision Making:  Today's Assessment / Status / Plan:     It was my pleasure to meet with Mr. Balderas.    Pacer check finds is functioning appropriately    Blood pressure is well controlled.      He is on appropriate statin.    He is on the SGL2 inhibitors    He can safely have colonoscopy and hold his Eliquis as needed    He understands the risks and benefits of chronic anticoagulation    I will see Mr. Balderas back in 1 year time and encouraged him to follow up with us over the phone or e-mail using my MyChart as issues arise.    It is my pleasure to participate in the care of Mr. Balderas.  Please do not hesitate to contact me with questions or concerns.    Reza Rush MD PhD [facility]  Cardiologist [facility] for [facility]    Please note that this dictation was created using voice recognition software. I have worked with consultants from the vendor as well as technical experts from [facility] to optimize the interface. I have made every reasonable attempt to correct obvious errors, but I expect that there are errors of grammar and possibly content I did not discover before finalizing the note.

## 2019-03-15 ENCOUNTER — TELEPHONE (OUTPATIENT)
Dept: ENDOCRINOLOGY | Facility: MEDICAL CENTER | Age: 74
End: 2019-03-15

## 2019-03-15 NOTE — TELEPHONE ENCOUNTER
Was the patient seen in the last year in this department? Yes    Does patient have an active prescription for medications requested? No     Received Request Via: Pharmacy     Patient would like sensors for Chetna Mcdaniel 10 day sent to [facility] pharmacy in [place]

## 2019-03-18 ENCOUNTER — TELEPHONE (OUTPATIENT)
Dept: ENDOCRINOLOGY | Facility: MEDICAL CENTER | Age: 74
End: 2019-03-18

## 2019-03-18 DIAGNOSIS — E11.65 TYPE 2 DIABETES MELLITUS WITH HYPERGLYCEMIA, WITH LONG-TERM CURRENT USE OF INSULIN (HCC): ICD-10-CM

## 2019-03-18 DIAGNOSIS — Z79.4 TYPE 2 DIABETES MELLITUS WITH HYPERGLYCEMIA, WITH LONG-TERM CURRENT USE OF INSULIN (HCC): ICD-10-CM

## 2019-03-18 RX ORDER — FLASH GLUCOSE SENSOR
1 KIT MISCELLANEOUS
Qty: 3 EACH | Refills: 6 | Status: SHIPPED | OUTPATIENT
Start: 2019-03-18 | End: 2019-04-05 | Stop reason: SDUPTHER

## 2019-03-18 RX ORDER — FLASH GLUCOSE SENSOR
1 KIT MISCELLANEOUS
Qty: 3 EACH | Refills: 6 | Status: CANCELLED | OUTPATIENT
Start: 2019-03-18

## 2019-04-01 ENCOUNTER — TELEPHONE (OUTPATIENT)
Dept: ENDOCRINOLOGY | Facility: MEDICAL CENTER | Age: 74
End: 2019-04-01

## 2019-04-01 NOTE — TELEPHONE ENCOUNTER
Was the patient seen in the last year in this department? Yes    Does patient have an active prescription for medications requested? No     Received Request Via: Pharmacy     New order   Refill [place] and sensors for 14 days

## 2019-04-05 DIAGNOSIS — Z79.4 TYPE 2 DIABETES MELLITUS WITH HYPERGLYCEMIA, WITH LONG-TERM CURRENT USE OF INSULIN (HCC): ICD-10-CM

## 2019-04-05 DIAGNOSIS — E11.65 TYPE 2 DIABETES MELLITUS WITH HYPERGLYCEMIA, WITH LONG-TERM CURRENT USE OF INSULIN (HCC): ICD-10-CM

## 2019-04-05 RX ORDER — FLASH GLUCOSE SCANNING READER
1 EACH MISCELLANEOUS CONTINUOUS
Qty: 1 DEVICE | Refills: 0 | Status: SHIPPED | OUTPATIENT
Start: 2019-04-05

## 2019-04-05 RX ORDER — FLASH GLUCOSE SENSOR
1 KIT MISCELLANEOUS
Qty: 2 EACH | Refills: 11 | Status: SHIPPED | OUTPATIENT
Start: 2019-04-05 | End: 2020-04-08 | Stop reason: SDUPTHER

## 2019-04-19 ENCOUNTER — TELEPHONE (OUTPATIENT)
Dept: ENDOCRINOLOGY | Facility: MEDICAL CENTER | Age: 74
End: 2019-04-19

## 2019-04-19 DIAGNOSIS — I25.5 ISCHEMIC CARDIOMYOPATHY: ICD-10-CM

## 2019-04-19 RX ORDER — CARVEDILOL 3.12 MG/1
TABLET ORAL
Qty: 180 TAB | Refills: 3 | Status: SHIPPED | OUTPATIENT
Start: 2019-04-19

## 2019-04-19 NOTE — TELEPHONE ENCOUNTER
I return pedro's call about wanting samples for Synjardy set aside. I left a voicemail letting him know they are ready for  today.

## 2019-04-25 DIAGNOSIS — I25.5 ISCHEMIC CARDIOMYOPATHY: ICD-10-CM

## 2019-04-25 DIAGNOSIS — I25.83 CORONARY ARTERY DISEASE DUE TO LIPID RICH PLAQUE: ICD-10-CM

## 2019-04-25 DIAGNOSIS — I25.5 CARDIOMYOPATHY, ISCHEMIC: ICD-10-CM

## 2019-04-25 DIAGNOSIS — I25.10 CORONARY ARTERY DISEASE DUE TO LIPID RICH PLAQUE: ICD-10-CM

## 2019-04-25 RX ORDER — LISINOPRIL 20 MG/1
20 TABLET ORAL DAILY
Qty: 100 TAB | Refills: 2 | Status: SHIPPED | OUTPATIENT
Start: 2019-04-25

## 2019-05-06 ENCOUNTER — OFFICE VISIT (OUTPATIENT)
Dept: ENDOCRINOLOGY | Facility: MEDICAL CENTER | Age: 74
End: 2019-05-06
Payer: MEDICARE

## 2019-05-06 VITALS
HEIGHT: 71 IN | OXYGEN SATURATION: 92 % | SYSTOLIC BLOOD PRESSURE: 110 MMHG | HEART RATE: 70 BPM | WEIGHT: 221 LBS | BODY MASS INDEX: 30.94 KG/M2 | DIASTOLIC BLOOD PRESSURE: 60 MMHG

## 2019-05-06 DIAGNOSIS — E11.9 TYPE 2 DIABETES MELLITUS WITHOUT COMPLICATION, UNSPECIFIED WHETHER LONG TERM INSULIN USE (HCC): ICD-10-CM

## 2019-05-06 DIAGNOSIS — I10 ESSENTIAL HYPERTENSION: ICD-10-CM

## 2019-05-06 LAB
HBA1C MFR BLD: 8.1 % (ref 0–5.6)
INT CON NEG: NEGATIVE
INT CON POS: POSITIVE

## 2019-05-06 PROCEDURE — 83036 HEMOGLOBIN GLYCOSYLATED A1C: CPT | Performed by: PHYSICIAN ASSISTANT

## 2019-05-06 PROCEDURE — 99214 OFFICE O/P EST MOD 30 MIN: CPT | Performed by: PHYSICIAN ASSISTANT

## 2019-05-06 PROCEDURE — 95251 CONT GLUC MNTR ANALYSIS I&R: CPT | Performed by: PHYSICIAN ASSISTANT

## 2019-05-06 NOTE — PATIENT INSTRUCTIONS
Now on:  1.  Ozempic once a week 1.0  2.  Tresiba 36 units at night (INCREASE to 42)  3.  Synjardy 12.5/1000 one in the AM one in the PM

## 2019-05-06 NOTE — PROGRESS NOTES
Return to office Patient Consult Note  Referred by: Thomas Zhang M.D.    Reason for consult: Diabetes Management Type 2    HPI:  Chirag Balderas is a 73 y.o. old patient who is seeing us today for diabetes care.  This is a pleasant patient with diabetes and I appreciate the opportunity to participate in the care of this patient.    Labs of 5/6/2019 HbA1c is 8.1  Labs of 1/14/19 GFR 59, HbA1c is 8.2    BG Diary:5/6/2019  In the AM:  Abbott Jonas      1. Type 2 diabetes mellitus without complication, unspecified whether long term insulin use (HCC)  This is a new patient on 1/28/19  He is on:  1.  Novolin R (25units TID)  2.  Metformin  3.  Novolin N (At bedtime)     STOP:  1.  Novolin R (25units TID)  2.  Metformin  3.  Novolin N (At bedtime)     Now on:  1.  Ozempic once a week 1.0  2.  Tresiba 36 units at night   3.  Synjardy 12.5/1000 one in the AM one in the PM    2. Essential hypertension  This is stable today and no new changes are needed or required in today's visit    ROS:   Constitutional: No night sweats.  Eyes:  No visual changes.  Cardiac: No chest pain, No palpitations or racing heart rate.  Resp: No shortness of breath, No cough,   Gi: No Diarrhea    All other systems were reviewed and were/are negative.      Past Medical History:  Patient Active Problem List    Diagnosis Date Noted   • Presence of biventricular AICD 05/02/2016     Priority: High   • Persistent atrial fibrillation (HCC) 03/04/2016     Priority: High   • Atypical atrial flutter (HCC) 03/04/2016     Priority: High   • Anticoagulant long-term use 03/04/2016     Priority: High   • Ischemic cardiomyopathy 05/14/2013     Priority: High   • Hypertriglyceridemia 05/14/2013     Priority: High   • Coronary artery disease due to lipid rich plaque 06/05/2012     Priority: High   • Essential hypertension 06/05/2012     Priority: High   • Dyslipidemia 06/05/2012     Priority: High   • CHF (congestive heart failure), NYHA class II, chronic,  systolic (CMS-HCC) EF 40-45% 2017 09/05/2014     Priority: Medium   • Sleep apnea 05/16/2013     Priority: Medium   • Type 2 diabetes mellitus without complication ([facility]) 06/05/2012     Priority: Medium   • Hypothyroid 09/03/2014     Priority: Low   • Depression 02/21/2014     Priority: Low   • History of coronary artery stent placement ~2001 04/19/2018   • Chronic obstructive pulmonary disease with acute exacerbation (HCC) 08/29/2017   • Chronic paroxysmal hemicrania, not intractable 07/09/2015   • Pulmonary nodule 09/03/2014   • ED (erectile dysfunction) 08/20/2013   • Prostate cancer screening 06/25/2013   • Colon cancer screening 06/25/2013       Past Surgical History:  Past Surgical History:   Procedure Laterality Date   • RECOVERY  3/18/2016    Procedure: CATH LAB BIV ICD INSERT ST[facility]JU[facility] WINSLOW;  Surgeon: Recoveryonly Surgery;  Location: SURGERY PRE-POST PROC UNIT [facility];  Service:    • AICD IMPLANT  March 2016    [employer] 3365-40Q CRT-D implanted by Dr. Winslow.   • OTHER NEUROLOGICAL SURG  03/2013    Laminectomy lumbar   • ZZZ CARDIAC CATH  2002   • CATARACT EXTRACTION WITH IOL Right 2000        • CATARACT EXTRACTION WITH IOL Left 1980            Allergies:  Pcn [penicillins]    Social History:  Social History     Social History   • Marital status:      Spouse name: N/A   • Number of children: N/A   • Years of education: N/A     Occupational History   • Not on file.     Social History Main Topics   • Smoking status: Former Smoker     Packs/day: 0.50     Years: 50.00     Types: Cigarettes     Quit date: 11/1/2012   • Smokeless tobacco: Never Used   • Alcohol use No   • Drug use: No   • Sexual activity: Not on file     Other Topics Concern   • Not on file     Social History Narrative   • No narrative on file       Family History:  Family History   Problem Relation Age of Onset   • Other Mother 84        failure to thrive   • Heart Disease Mother    • Heart Attack Father 72  "      Medications:    Current Outpatient Prescriptions:   •  lisinopril (PRINIVIL) 20 MG Tab, Take 1 Tab by mouth every day., Disp: 100 Tab, Rfl: 2  •  carvedilol (COREG) 3.125 MG Tab, TAKE 1 TABLET BY MOUTH TWICE DAILY WITH MEALS, Disp: 180 Tab, Rfl: 3  •  Continuous Blood Gluc Sensor (FREESTYLE ERICKA SENSOR SYSTEM) Misc, 1 Each by Does not apply route every 14 days., Disp: 2 Each, Rfl: 11  •  Continuous Blood Gluc  (FREESTYLE ERICKA 14 DAY READER) Device, 1 Each by Does not apply route Continuous., Disp: 1 Device, Rfl: 0  •  levothyroxine (SYNTHROID) 112 MCG Tab, TAKE ONE TABLET BY MOUTH IN THE MORNING ON EMPTY STOMACH, Disp: 90 Tab, Rfl: 3  •  albuterol (PROVENTIL) 2.5mg/3ml Nebu Soln solution for nebulization, , Disp: , Rfl:   •  ANORO ELLIPTA 62.5-25 MCG/INH AEROSOL POWDER, BREATH ACTIVATED inhaler, , Disp: , Rfl:   •  ipratropium (ATROVENT) 0.02 % Solution, , Disp: , Rfl:   •  tramadol (ULTRAM) 50 MG Tab, , Disp: , Rfl:   •  Semaglutide (OZEMPIC) 1 MG/DOSE Solution Pen-injector, Inject 1 mg as instructed every 7 days., Disp: 1 PEN, Rfl: 11  •  Empagliflozin-Metformin HCl ER 12.5-1000 MG TABLET SR 24 HR, Take 1 Tab by mouth 2 times a day., Disp: 60 Tab, Rfl: 11  •  Insulin Degludec (TRESIBA FLEXTOUCH) 200 UNIT/ML Solution Pen-injector, Inject 50 Units as instructed every bedtime., Disp: 3 PEN, Rfl: 2  •  polyethylene glycol 3350 (MIRALAX) Powder, Take 17 g by mouth every day., Disp: 1 Bottle, Rfl: 3  •  ELIQUIS 5 MG Tab, TAKE 1 TABLET BY MOUTH TWICE DAILY, Disp: 180 Tab, Rfl: 0  •  rosuvastatin (CRESTOR) 40 MG tablet, Take 1 Tab by mouth every day., Disp: 90 Tab, Rfl: 2  •  furosemide (LASIX) 40 MG Tab, TAKE 1 TABLET BY MOUTH ONCE DAILY AS NEEDED, Disp: 90 Tab, Rfl: 2  •  FREESTYLE LITE strip, TEST BLOOD SUGAR 4 TO 5 TIMES DAILY AS DIRECTED, Disp: 500 Strip, Rfl: 2  •  Insulin Syringe-Needle U-100 31G X 3/8\" 0.5 ML Misc, Using 5 per day with insulin, Disp: 400 Each, Rfl: 2        Physical Examination: " "  Vital signs: /60 (BP Location: Left arm, Patient Position: Sitting)   Pulse 70   Ht 1.803 m (5' 11\")   Wt 100.2 kg (221 lb)   SpO2 92%   BMI 30.82 kg/m²   General: No distress, cooperative, well dressed and well nourished.   Eyes: No scleral icterus or discharge, No hyposphagma  ENMT: Normal on external inspection of nose, lips, No nasal drainage   Neck: No abnormal masses on inspection  Resp: Normal effort, Bilateral clear to auscultation, No wheezing, No rales  CVS: Regular rate and rhythm, S1 S2 normal, No murmur. No gallop  Extremities: No edema bilateral extremities  Neuro: Alert and oriented  Skin: No rash, No Ulcers  Psych: Normal mood and affect      Assessment and Plan:    1. Type 2 diabetes mellitus without complication, unspecified whether long term insulin use (HCC)  Now on:  1.  Ozempic once a week 1.0  2.  Tresiba 36 units at night (INCREASE to 42)  3.  Synjardy 12.5/1000 one in the AM one in the PM    2. Essential hypertension  This is stable today and no new changes are needed or required in today's visit    No Follow-up on file.      Thank you kindly for allowing me to participate in the diabetes care plan for this patient.    Luís Otto PA-C, BC-ADM  Board Certified - Advanced Diabetes Management  05/06/19    CC:   Thomas Zhang M.D.    "

## 2019-05-08 ENCOUNTER — TELEPHONE (OUTPATIENT)
Dept: ENDOCRINOLOGY | Facility: MEDICAL CENTER | Age: 74
End: 2019-05-08

## 2019-05-08 ENCOUNTER — ANTICOAGULATION MONITORING (OUTPATIENT)
Dept: MEDICAL GROUP | Facility: PHYSICIAN GROUP | Age: 74
End: 2019-05-08

## 2019-05-08 ENCOUNTER — NON-PROVIDER VISIT (OUTPATIENT)
Dept: CARDIOLOGY | Facility: PHYSICIAN GROUP | Age: 74
End: 2019-05-08
Payer: MEDICARE

## 2019-05-08 VITALS
HEART RATE: 70 BPM | WEIGHT: 216 LBS | OXYGEN SATURATION: 90 % | SYSTOLIC BLOOD PRESSURE: 100 MMHG | DIASTOLIC BLOOD PRESSURE: 62 MMHG | HEIGHT: 71 IN | BODY MASS INDEX: 30.24 KG/M2

## 2019-05-08 DIAGNOSIS — I25.5 ISCHEMIC CARDIOMYOPATHY: Chronic | ICD-10-CM

## 2019-05-08 DIAGNOSIS — Z95.810 PRESENCE OF BIVENTRICULAR AICD: ICD-10-CM

## 2019-05-08 DIAGNOSIS — I48.19 PERSISTENT ATRIAL FIBRILLATION (HCC): ICD-10-CM

## 2019-05-08 DIAGNOSIS — I25.83 CORONARY ARTERY DISEASE DUE TO LIPID RICH PLAQUE: ICD-10-CM

## 2019-05-08 DIAGNOSIS — Z79.01 ANTICOAGULANT LONG-TERM USE: ICD-10-CM

## 2019-05-08 DIAGNOSIS — I25.10 CORONARY ARTERY DISEASE DUE TO LIPID RICH PLAQUE: ICD-10-CM

## 2019-05-08 DIAGNOSIS — I48.4 ATYPICAL ATRIAL FLUTTER (HCC): ICD-10-CM

## 2019-05-08 PROCEDURE — 93284 PRGRMG EVAL IMPLANTABLE DFB: CPT | Performed by: NURSE PRACTITIONER

## 2019-05-08 RX ORDER — POLYETHYLENE GLYCOL-3350 AND ELECTROLYTES 236; 6.74; 5.86; 2.97; 22.74 G/274.31G; G/274.31G; G/274.31G; G/274.31G; G/274.31G
POWDER, FOR SOLUTION ORAL
Refills: 0 | COMMUNITY
Start: 2019-03-29 | End: 2022-03-04

## 2019-05-08 NOTE — TELEPHONE ENCOUNTER
DOCUMENTATION OF PAR STATUS:    1. Name of Medication & Dose: Synjardy 12.5-1000mg, Ozempic 1mg & Tresiba Flextouch U200    2. Name of Prescription Coverage Company & phone #: [employer] Ph: [phone]    3. Date Prior Auth Submitted: 5/8/19    4. What information was given to obtain insurance decision? Chart notes and labs    5. Prior Auth Status? Pending    6. Pharmacy Notified: yes

## 2019-05-08 NOTE — PROGRESS NOTES
Pt presents to the CC office for Eliquis samples  4 weeks given  Cherelle Nixon, Clinical Pharmacist

## 2019-05-08 NOTE — PROGRESS NOTES
Patient does not some mild diaphragmatic stimulation when lying on his left side, but it is tolerable.    Device is working normally.  No device therapy.  Mode switching episodes 100% of the time (known); he is anticoagulated with Eliquis.  Normal sensing of RA and RV leads; stable capture of RV and LV leads; stable impedances. Battery charge time is 9.6 seconds; battery longevity is 4 years.  No changes are made today (unable to lower LV output any lower, without losing the benefit of BiV pacing).    He is given some Eliquis samples today by Cherelle Nixon PharmD.    FU in 6 months for next AICD check with me.    He is due for annual follow-up with Dr. Rush in March 2020.    Collaborating MD: CARLTON Rivas

## 2019-06-04 DIAGNOSIS — E10.37X9: ICD-10-CM

## 2019-06-04 RX ORDER — FUROSEMIDE 40 MG/1
40 TABLET ORAL
Qty: 90 TAB | Refills: 3 | Status: SHIPPED | OUTPATIENT
Start: 2019-06-04

## 2019-08-06 ENCOUNTER — ANTICOAGULATION MONITORING (OUTPATIENT)
Dept: VASCULAR LAB | Facility: MEDICAL CENTER | Age: 74
End: 2019-08-06

## 2019-08-06 ENCOUNTER — TELEPHONE (OUTPATIENT)
Dept: VASCULAR LAB | Facility: MEDICAL CENTER | Age: 74
End: 2019-08-06

## 2019-08-06 ENCOUNTER — OFFICE VISIT (OUTPATIENT)
Dept: ENDOCRINOLOGY | Facility: MEDICAL CENTER | Age: 74
End: 2019-08-06
Payer: MEDICARE

## 2019-08-06 VITALS
DIASTOLIC BLOOD PRESSURE: 58 MMHG | WEIGHT: 215 LBS | HEART RATE: 67 BPM | BODY MASS INDEX: 29.99 KG/M2 | SYSTOLIC BLOOD PRESSURE: 118 MMHG | OXYGEN SATURATION: 97 %

## 2019-08-06 DIAGNOSIS — E11.9 TYPE 2 DIABETES MELLITUS WITHOUT COMPLICATION, UNSPECIFIED WHETHER LONG TERM INSULIN USE (HCC): ICD-10-CM

## 2019-08-06 DIAGNOSIS — Z79.01 ANTICOAGULANT LONG-TERM USE: ICD-10-CM

## 2019-08-06 DIAGNOSIS — I10 ESSENTIAL HYPERTENSION: ICD-10-CM

## 2019-08-06 DIAGNOSIS — I48.4 ATYPICAL ATRIAL FLUTTER (HCC): ICD-10-CM

## 2019-08-06 DIAGNOSIS — I48.19 PERSISTENT ATRIAL FIBRILLATION (HCC): ICD-10-CM

## 2019-08-06 LAB
HBA1C MFR BLD: 8.1 % (ref 0–5.6)
INT CON NEG: NEGATIVE
INT CON POS: POSITIVE

## 2019-08-06 PROCEDURE — 83036 HEMOGLOBIN GLYCOSYLATED A1C: CPT | Performed by: PHYSICIAN ASSISTANT

## 2019-08-06 PROCEDURE — 99214 OFFICE O/P EST MOD 30 MIN: CPT | Performed by: PHYSICIAN ASSISTANT

## 2019-08-06 NOTE — PROGRESS NOTES
Return to office Patient Consult Note  Referred by: Thomas Zhang M.D.    Reason for consult: Diabetes Management Type 2    HPI:  Chirag Balderas is a 73 y.o. old patient who is seeing us today for diabetes care.  This is a pleasant patient with diabetes and I appreciate the opportunity to participate in the care of this patient.    Labs of 8/6/2019 HbA1c is 8.1  Labs of 5/6/2019 HbA1c is 8.1  Labs of 1/14/19 GFR 59, HbA1c is 8.2    BG Diary:8/6/2019  In the AM:  See [employer] CGM  Average BG is 154      1. Type 2 diabetes mellitus without complication, unspecified whether long term insulin use (HCC)    This is a new patient on 1/28/19  He is on:  1.  Novolin R (25units TID)  2.  Metformin  3.  Novolin N (At bedtime)     STOP:  1.  Novolin R (25units TID)  2.  Metformin  3.  Novolin N (At bedtime)     Now on:  1.  Ozempic once a week 1.0  2.  Tresiba 42 units at night   3.  Synjardy 12.5/1000 one in the AM one in the PM     2. Essential hypertension  This is stable today and no new changes are needed or required in today's visit       ROS:   Constitutional: No night sweats.  Eyes:  No visual changes.  Cardiac: No chest pain, No palpitations or racing heart rate.  Resp: No shortness of breath, No cough,   Gi: No Diarrhea    All other systems were reviewed and were/are negative.      Past Medical History:  Patient Active Problem List    Diagnosis Date Noted   • History of coronary artery stent placement ~2001 04/19/2018   • Chronic obstructive pulmonary disease with acute exacerbation (HCC) 08/29/2017   • Presence of biventricular AICD 05/02/2016   • Persistent atrial fibrillation (HCC) 03/04/2016   • Atypical atrial flutter (HCC) 03/04/2016   • Anticoagulant long-term use 03/04/2016   • Chronic paroxysmal hemicrania, not intractable 07/09/2015   • CHF (congestive heart failure), NYHA class II, chronic, systolic (CMS-[facility]) EF 40-45% 2017 09/05/2014   • Hypothyroid 09/03/2014   • Pulmonary nodule 09/03/2014   •  Depression 2014   • ED (erectile dysfunction) 2013   • Prostate cancer screening 2013   • Colon cancer screening 2013   • Sleep apnea 2013   • Ischemic cardiomyopathy 2013   • Hypertriglyceridemia 2013   • Coronary artery disease due to lipid rich plaque 2012   • Essential hypertension 2012   • Type 2 diabetes mellitus without complication (HCC) 2012   • Dyslipidemia 2012       Past Surgical History:  Past Surgical History:   Procedure Laterality Date   • RECOVERY  3/18/2016    Procedure: CATH LAB BIV ICD INSERT ST[facility] WINSLOW;  Surgeon: Recoveryonly Surgery;  Location: SURGERY PRE-POST PROC UNIT [facility] [place];  Service:    • AICD IMPLANT  2016    [employer] 3365-40Q CRT-D implanted by Dr. Winslow.   • OTHER NEUROLOGICAL SURG  2013    Laminectomy lumbar   • ZZZ CARDIAC CATH     • CATARACT EXTRACTION WITH IOL Right         • CATARACT EXTRACTION WITH IOL Left             Allergies:  Pcn [penicillins]    Social History:  Social History     Socioeconomic History   • Marital status:      Spouse name: Not on file   • Number of children: Not on file   • Years of education: Not on file   • Highest education level: Not on file   Occupational History   • Not on file   Social Needs   • Financial resource strain: Not on file   • Food insecurity:     Worry: Not on file     Inability: Not on file   • Transportation needs:     Medical: Not on file     Non-medical: Not on file   Tobacco Use   • Smoking status: Former Smoker     Packs/day: 0.50     Years: 50.00     Pack years: 25.00     Types: Cigarettes     Last attempt to quit: 2012     Years since quittin.7   • Smokeless tobacco: Never Used   Substance and Sexual Activity   • Alcohol use: No   • Drug use: No   • Sexual activity: Not on file   Lifestyle   • Physical activity:     Days per week: Not on file     Minutes per session: Not on file   • Stress: Not on file    Relationships   • Social connections:     Talks on phone: Not on file     Gets together: Not on file     Attends [employer] service: Not on file     Active member of club or organization: Not on file     Attends meetings of clubs or organizations: Not on file     Relationship status: Not on file   • Intimate partner violence:     Fear of current or ex partner: Not on file     Emotionally abused: Not on file     Physically abused: Not on file     Forced sexual activity: Not on file   Other Topics Concern   • Not on file   Social History Narrative   • Not on file       Family History:  Family History   Problem Relation Age of Onset   • Other Mother 84        failure to thrive   • Heart Disease Mother    • Heart Attack Father 72       Medications:    Current Outpatient Medications:   •  apixaban (ELIQUIS) 5mg Tab, Take 1 Tab by mouth 2 Times a Day., Disp: 180 Tab, Rfl: 1  •  furosemide (LASIX) 40 MG Tab, Take 1 Tab by mouth 1 time daily as needed., Disp: 90 Tab, Rfl: 3  •  GAVILYTE-G 236 g Recon Soln, , Disp: , Rfl: 0  •  Insulin Degludec (TRESIBA FLEXTOUCH) 200 UNIT/ML Solution Pen-injector, Inject 50 Units as instructed every bedtime., Disp: 3 PEN, Rfl: 2  •  Semaglutide (OZEMPIC) 1 MG/DOSE Solution Pen-injector, Inject 1 mg as instructed every 7 days., Disp: 1 PEN, Rfl: 11  •  Empagliflozin-Metformin HCl ER 12.5-1000 MG TABLET SR 24 HR, Take 1 Tab by mouth 2 times a day., Disp: 60 Tab, Rfl: 11  •  lisinopril (PRINIVIL) 20 MG Tab, Take 1 Tab by mouth every day., Disp: 100 Tab, Rfl: 2  •  carvedilol (COREG) 3.125 MG Tab, TAKE 1 TABLET BY MOUTH TWICE DAILY WITH MEALS, Disp: 180 Tab, Rfl: 3  •  Continuous Blood Gluc Sensor (FREESTYLE ERICKA SENSOR SYSTEM) Misc, 1 Each by Does not apply route every 14 days., Disp: 2 Each, Rfl: 11  •  Continuous Blood Gluc  (FREESTYLE ERICKA 14 DAY READER) Device, 1 Each by Does not apply route Continuous., Disp: 1 Device, Rfl: 0  •  levothyroxine (SYNTHROID) 112 MCG Tab, TAKE ONE  "TABLET BY MOUTH IN THE MORNING ON EMPTY STOMACH, Disp: 90 Tab, Rfl: 3  •  albuterol (PROVENTIL) 2.5mg/3ml Nebu Soln solution for nebulization, , Disp: , Rfl:   •  ANORO ELLIPTA 62.5-25 MCG/INH AEROSOL POWDER, BREATH ACTIVATED inhaler, , Disp: , Rfl:   •  ipratropium (ATROVENT) 0.02 % Solution, , Disp: , Rfl:   •  tramadol (ULTRAM) 50 MG Tab, , Disp: , Rfl:   •  polyethylene glycol 3350 (MIRALAX) Powder, Take 17 g by mouth every day., Disp: 1 Bottle, Rfl: 3  •  rosuvastatin (CRESTOR) 40 MG tablet, Take 1 Tab by mouth every day., Disp: 90 Tab, Rfl: 2  •  FREESTYLE LITE strip, TEST BLOOD SUGAR 4 TO 5 TIMES DAILY AS DIRECTED, Disp: 500 Strip, Rfl: 2  •  Insulin Syringe-Needle U-100 31G X 3/8\" 0.5 ML Misc, Using 5 per day with insulin, Disp: 400 Each, Rfl: 2        Physical Examination:   Vital signs: /58 (BP Location: Left arm, Patient Position: Sitting)   Pulse 67   Wt 97.5 kg (215 lb)   SpO2 97%   BMI 29.99 kg/m²   General: No distress, cooperative, well dressed and well nourished.   Eyes: No scleral icterus or discharge, No hyposphagma  ENMT: Normal on external inspection of nose, lips, No nasal drainage   Neck: No abnormal masses on inspection  Resp: Normal effort, Bilateral clear to auscultation, No wheezing, No rales  CVS: Regular rate and rhythm, S1 S2 normal, No murmur. No gallop  Extremities: No edema bilateral extremities  Neuro: Alert and oriented  Skin: No rash, No Ulcers  Psych: Normal mood and affect      Assessment and Plan:    1. Type 2 diabetes mellitus without complication, unspecified whether long term insulin use (HCC)     Now on:  1.  Ozempic once a week 1.0  2.  Tresiba 42 units at night  (Increase 48)  3.  Synjardy 12.5/1000 one in the AM one in the PM    2. Essential hypertension  This patient uses a continuous glucose monitor 24 hours a day to measure there blood glucose levels.  Our office printed out the CGM data report today in the office and I discussed this report at length with " the patient today.  Recommendations on the data were given to the patient today to implement in the A&P section.      Return in about 3 months (around 11/6/2019).      Thank you kindly for allowing me to participate in the diabetes care plan for this patient.    Luís Otto PA-C, BC-[facility]  Board Certified - Advanced Diabetes Management  08/06/19    CC:   Thomas Zhang M.D.

## 2019-08-06 NOTE — TELEPHONE ENCOUNTER
Received call from Mrs. Balderas, that he is currently unable to pay for his Eliquis.  She will come here to [facility] for some samples to tide him over.  Cherelle Nixon, Clinical Pharmacist, CDE, CACP

## 2019-08-06 NOTE — PATIENT INSTRUCTIONS
Now on:  1.  Ozempic once a week 1.0  2.  Tresiba 42 units at night  (Increase 48)  3.  Synjardy 12.5/1000 one in the AM one in the PM

## 2019-10-08 ENCOUNTER — TELEPHONE (OUTPATIENT)
Dept: ENDOCRINOLOGY | Facility: MEDICAL CENTER | Age: 74
End: 2019-10-08

## 2019-10-08 NOTE — TELEPHONE ENCOUNTER
Patient needs a referral sent over to Dr. Garza (Endocrinologist). Patient is no longer going to be on [employer] insurance and needs to switch doctors. Phone number for the office is [phone]. Fax number is [phone].

## 2020-02-12 RX ORDER — SEMAGLUTIDE 1.34 MG/ML
INJECTION, SOLUTION SUBCUTANEOUS
Qty: 6 PEN | Refills: 4 | Status: SHIPPED | OUTPATIENT
Start: 2020-02-12

## 2020-02-12 NOTE — TELEPHONE ENCOUNTER
Received request via: Pharmacy    Was the patient seen in the last year in this department? Yes LOV: 08/06/19    Does the patient have an active prescription (recently filled or refills available) for medication(s) requested? No     OZEMPIC, 1 MG/DOSE, 2 MG/1.5ML Solution Pen-injector        Sig: INJECT 1 MG SUBCUTANEOUSLY ONCE A WEEK AS DIRECTED

## 2020-03-12 ENCOUNTER — ANTICOAGULATION MONITORING (OUTPATIENT)
Dept: VASCULAR LAB | Facility: MEDICAL CENTER | Age: 75
End: 2020-03-12

## 2020-03-12 DIAGNOSIS — I48.19 PERSISTENT ATRIAL FIBRILLATION (HCC): ICD-10-CM

## 2020-03-12 DIAGNOSIS — Z79.01 ANTICOAGULANT LONG-TERM USE: ICD-10-CM

## 2020-03-12 DIAGNOSIS — I48.4 ATYPICAL ATRIAL FLUTTER (HCC): ICD-10-CM

## 2020-03-12 NOTE — PROGRESS NOTES
Chart reviewed in accordance with DOAC protocol.  Lab work reviewed.  Cr 1.4  Age 74  Wt 97.5kg  Continue current dose of Eliquis.  Follow up with BMP in 1 year.    Salma BAILEY  [facility]

## 2020-03-30 RX ORDER — APIXABAN 5 MG/1
TABLET, FILM COATED ORAL
Qty: 180 TAB | Refills: 2 | Status: SHIPPED | OUTPATIENT
Start: 2020-03-30 | End: 2020-09-30

## 2020-04-07 ENCOUNTER — TELEPHONE (OUTPATIENT)
Dept: ENDOCRINOLOGY | Facility: MEDICAL CENTER | Age: 75
End: 2020-04-07

## 2020-04-07 NOTE — TELEPHONE ENCOUNTER
[facility] pharmacy in [place] called and is checking in on the prescription request for the Freestyle Jonas sensors.

## 2020-04-08 DIAGNOSIS — Z79.4 TYPE 2 DIABETES MELLITUS WITH HYPERGLYCEMIA, WITH LONG-TERM CURRENT USE OF INSULIN (HCC): ICD-10-CM

## 2020-04-08 DIAGNOSIS — E11.65 TYPE 2 DIABETES MELLITUS WITH HYPERGLYCEMIA, WITH LONG-TERM CURRENT USE OF INSULIN (HCC): ICD-10-CM

## 2020-04-08 RX ORDER — FLASH GLUCOSE SENSOR
1 KIT MISCELLANEOUS
Qty: 2 EACH | Refills: 11 | Status: SHIPPED | OUTPATIENT
Start: 2020-04-08 | End: 2021-03-08 | Stop reason: SDUPTHER

## 2020-08-26 NOTE — PROGRESS NOTES
OP Anticoagulation Telephone Note    Date: 1/26/2017  Anticoagulation Summary as of 1/26/2017     INR goal 2.0-3.0    Selected INR 2.41 (1/26/2017)    Maintenance plan 2.5 mg (5 mg x 0.5) on Mon, Wed, Fri; 5 mg (5 mg x 1) all other days    Weekly total 27.5 mg    No change documented Viktoria Chaidez, Med Ass't    Plan last modified Cherelle Nixon PHARMD (3/30/2016)    Next INR check 2/23/2017    Target end date Indefinite      Anticoagulation Episode Summary     INR check location Outside Lab    Preferred lab     Send INR reminders to     Comments       Anticoagulation Care Providers     Provider Role Specialty Phone number    Zain Winslow M.D. Referring Cardiac Electrophysiology [phone]    Cherelle Nixon PHARMD Responsible          Anticoagulation Patient Findings   Negatives Missed Doses, Extra Doses, Medication Changes, Antibiotic Use, Diet Changes, Dental/Other Procedures, Hospitalization, Bleeding Gums, Nose Bleeds, Blood in Urine, Blood in Stool, Any Bruising, Other Complaints      Plan:  Spoke with patient on the phone. Patient is therapeutic today. Patient denies any changes in medications or diet. Patient denies any signs or symptoms of bleeding or clotting. Instructed patient to call clinic if any unusual bleeding or bruising occurs. Will continue dosing as outlined above. Will follow-up with patient in 4 weeks.    Viktoria Chaidez, Medical Assistant    I have reviewed and agree with the plan above on 02/07/2017      ROSANA LimaD     Age appropriate

## 2020-09-30 DIAGNOSIS — Z79.01 ANTICOAGULANT LONG-TERM USE: ICD-10-CM

## 2020-09-30 RX ORDER — APIXABAN 5 MG/1
TABLET, FILM COATED ORAL
Qty: 180 TAB | Refills: 1 | Status: SHIPPED | OUTPATIENT
Start: 2020-09-30

## 2021-03-08 DIAGNOSIS — Z79.4 TYPE 2 DIABETES MELLITUS WITH HYPERGLYCEMIA, WITH LONG-TERM CURRENT USE OF INSULIN (HCC): ICD-10-CM

## 2021-03-08 DIAGNOSIS — E11.65 TYPE 2 DIABETES MELLITUS WITH HYPERGLYCEMIA, WITH LONG-TERM CURRENT USE OF INSULIN (HCC): ICD-10-CM

## 2021-03-08 RX ORDER — FLASH GLUCOSE SENSOR
1 KIT MISCELLANEOUS
Qty: 2 EACH | Refills: 11 | Status: SHIPPED | OUTPATIENT
Start: 2021-03-08

## 2021-09-01 ENCOUNTER — DOCUMENTATION (OUTPATIENT)
Dept: VASCULAR LAB | Facility: MEDICAL CENTER | Age: 76
End: 2021-09-01

## 2021-09-01 NOTE — PROGRESS NOTES
Received clearance request from [facility] for patient to hold anticoagulation for epidural.  Upon speaking with patient, this procedure has already been completed.  Also discussed status of anticoagulation with patient.  Pt denies any unusual s/s of bleeding, bruising, clotting or any changes to diet or medications.  Labs are up to date as below.    Lab Results   Component Value Date    SODIUM 145 08/19/2021    POTASSIUM 4.7 08/19/2021    CHLORIDE 108 (H) 08/19/2021    CO2 28 08/19/2021    GLUCOSE 161 (H) 08/19/2021    BUN 19 (H) 08/19/2021    CREATININE 1.3 08/19/2021     Lab Results   Component Value Date    WBC 9.1 08/13/2020    RBC 5.80 08/13/2020    HEMOGLOBIN 17.2 08/13/2020    HEMATOCRIT 53.4 (H) 08/13/2020    MCV 92.1 08/13/2020    MCH 29.7 08/13/2020    MCHC 32.2 (L) 08/13/2020    MPV 11.2 (H) 08/13/2020      Patient to continue with current dosing of Eliquis.  Follow up in six months.  Rafat Marcial, PharmD, BCACP

## 2023-02-22 PROBLEM — I50.33 ACUTE ON CHRONIC HEART FAILURE WITH PRESERVED EJECTION FRACTION (HFPEF) (HCC): Status: ACTIVE | Noted: 2023-02-22

## 2023-02-23 PROBLEM — I50.33 ACUTE ON CHRONIC HEART FAILURE WITH PRESERVED EJECTION FRACTION (HFPEF) (HCC): Status: RESOLVED | Noted: 2023-02-22 | Resolved: 2023-02-23

## 2024-09-28 NOTE — PROGRESS NOTES
Anticoagulation Summary  As of 5/7/2018    INR goal:   2.0-3.0   TTR:   43.5 % (2.1 y)   Today's INR:   3.2! (5/4/2018)   Warfarin maintenance plan:   5 mg (5 mg x 1) on Mon; 2.5 mg (5 mg x 0.5) all other days   Weekly warfarin total:   20 mg   Plan last modified:   Alley Alcala, PharmD (2/5/2018)   Next INR check:   5/18/2018   Target end date:   Indefinite    Indications    Anticoagulant long-term use [Z79.01]  Atypical atrial flutter (HCC) [I48.4]  Persistent atrial fibrillation (HCC) [I48.1]             Anticoagulation Episode Summary     INR check location:   Outside Lab    Preferred lab:       Send INR reminders to:       Comments:   Jessica      Anticoagulation Care Providers     Provider Role Specialty Phone number    Zain Winslow M.D. Referring Cardiac Electrophysiology [phone]    Anna LimaD Responsible          Anticoagulation Patient Findings    Left voicemail message to report a supratherapeutic INR of 3.2.  Requested pt contact the clinic for any s/s of unusual bleeding, bruising, clotting or any changes to diet or medication.   Instructed patient to decrease today's dose to 2.5mg, then resume current warfarin regimen.  FU 2 weeks.  Rafat Marcial, PharmD       Clothing